# Patient Record
Sex: MALE | Race: OTHER | HISPANIC OR LATINO
[De-identification: names, ages, dates, MRNs, and addresses within clinical notes are randomized per-mention and may not be internally consistent; named-entity substitution may affect disease eponyms.]

---

## 2018-02-05 ENCOUNTER — APPOINTMENT (OUTPATIENT)
Dept: ENDOCRINOLOGY | Facility: CLINIC | Age: 63
End: 2018-02-05

## 2021-07-28 ENCOUNTER — RESULT REVIEW (OUTPATIENT)
Age: 66
End: 2021-07-28

## 2021-08-04 LAB
ALBUMIN SERPL ELPH-MCNC: 4.6 G/DL
ALP BLD-CCNC: 97 U/L
ALT SERPL-CCNC: 20 U/L
ANION GAP SERPL CALC-SCNC: 12 MMOL/L
APTT BLD: 32.8 SEC
AST SERPL-CCNC: 19 U/L
BASOPHILS # BLD AUTO: 0.05 K/UL
BASOPHILS NFR BLD AUTO: 0.8 %
BILIRUB SERPL-MCNC: 0.3 MG/DL
BUN SERPL-MCNC: 13 MG/DL
CALCIUM SERPL-MCNC: 9.8 MG/DL
CHLORIDE SERPL-SCNC: 104 MMOL/L
CO2 SERPL-SCNC: 25 MMOL/L
CREAT SERPL-MCNC: 1.14 MG/DL
EOSINOPHIL # BLD AUTO: 0.07 K/UL
EOSINOPHIL NFR BLD AUTO: 1.1 %
GLUCOSE SERPL-MCNC: 82 MG/DL
HCT VFR BLD CALC: 48.1 %
HGB BLD-MCNC: 16.1 G/DL
IMM GRANULOCYTES NFR BLD AUTO: 0.5 %
INR PPP: 1.04 RATIO
LDH SERPL-CCNC: 179 U/L
LYMPHOCYTES # BLD AUTO: 1.84 K/UL
LYMPHOCYTES NFR BLD AUTO: 28.3 %
MAGNESIUM SERPL-MCNC: 2.1 MG/DL
MAN DIFF?: NORMAL
MCHC RBC-ENTMCNC: 29.9 PG
MCHC RBC-ENTMCNC: 33.5 GM/DL
MCV RBC AUTO: 89.2 FL
MONOCYTES # BLD AUTO: 0.68 K/UL
MONOCYTES NFR BLD AUTO: 10.5 %
NEUTROPHILS # BLD AUTO: 3.83 K/UL
NEUTROPHILS NFR BLD AUTO: 58.8 %
PHOSPHATE SERPL-MCNC: 3.2 MG/DL
PLATELET # BLD AUTO: 311 K/UL
POTASSIUM SERPL-SCNC: 4.7 MMOL/L
PROT SERPL-MCNC: 7.5 G/DL
PT BLD: 12.2 SEC
RBC # BLD: 5.39 M/UL
RBC # FLD: 13.1 %
SODIUM SERPL-SCNC: 141 MMOL/L
URATE SERPL-MCNC: 7.2 MG/DL
WBC # FLD AUTO: 6.5 K/UL

## 2021-08-05 LAB
CD3 CELLS # BLD: 1256 /UL
CD3 CELLS NFR BLD: 74 %
CD3+CD4+ CELLS # BLD: 709 /UL
CD3+CD4+ CELLS NFR BLD: 42 %
CD3+CD4+ CELLS/CD3+CD8+ CLL SPEC: 2.35 RATIO
CD3+CD8+ CELLS # SPEC: 302 /UL
CD3+CD8+ CELLS NFR BLD: 18 %
HBV CORE IGG+IGM SER QL: NONREACTIVE
HBV SURFACE AB SER QL: NONREACTIVE
HBV SURFACE AG SER QL: NONREACTIVE
HCV AB SER QL: NONREACTIVE
HCV S/CO RATIO: 0.13 S/CO
HIV1+2 AB SPEC QL IA.RAPID: NONREACTIVE

## 2021-08-06 ENCOUNTER — APPOINTMENT (OUTPATIENT)
Dept: HEMATOLOGY ONCOLOGY | Facility: CLINIC | Age: 66
End: 2021-08-06
Payer: MEDICARE

## 2021-08-06 VITALS
TEMPERATURE: 97.5 F | WEIGHT: 241 LBS | OXYGEN SATURATION: 99 % | DIASTOLIC BLOOD PRESSURE: 80 MMHG | SYSTOLIC BLOOD PRESSURE: 128 MMHG | RESPIRATION RATE: 16 BRPM | HEIGHT: 72 IN | BODY MASS INDEX: 32.64 KG/M2 | HEART RATE: 60 BPM

## 2021-08-06 DIAGNOSIS — K22.70 BARRETT'S ESOPHAGUS W/OUT DYSPLASIA: ICD-10-CM

## 2021-08-06 DIAGNOSIS — Z80.0 FAMILY HISTORY OF MALIGNANT NEOPLASM OF DIGESTIVE ORGANS: ICD-10-CM

## 2021-08-06 PROCEDURE — 99205 OFFICE O/P NEW HI 60 MIN: CPT | Mod: GC

## 2021-08-06 NOTE — ASSESSMENT
[FreeTextEntry1] : 66 year old male presenting for initial consultation for recent diagnosis of DLBCL, non-germinal center subtype. Patient had a sonoguided core biopsy of right lymph node of neck on 7/28/21 with pathology resulting in \par CD5, CD10 negative lymphoma, favor Diffuse large B-cell lymphoma, non-Germinal center subtype. Given the pathology results, I think he needs an excisional biopsy to further characterize his lymphoma. Additionally, he has had a CT neck (report pending), but will need full body imaging to stage his lymphoma. We discussed that aggressive lymphomas require treatment with chemotherapy, but depending on the subtype are potentially curable. He has had some symptoms related to his neck mass including some mild difficulty swallowing but has been able to eat and drink. He does not have shortness of breath.  \par \par -plan to expedite PET/CT for staging and to guide for excisional biopsy\par -referred patient to ENT for excisional biopsy, appointment scheduled for Monday \par -will plan for chemo port placement\par -will discuss treatment option once excisional biopsy is complete  \par

## 2021-08-06 NOTE — END OF VISIT
[FreeTextEntry3] : Patient seen and case discussed with LAURA Bolanos. I agree with above and have edited the note where needed.\par  [Time Spent: ___ minutes] : I have spent [unfilled] minutes of time on the encounter.

## 2021-08-06 NOTE — RESULTS/DATA
[FreeTextEntry1] : Final Diagnosis\par 1.  Lymph node, right neck, sono-guided core biopsy:\par - CD5, CD10 negative lymphoma, favor Diffuse large B-cell\par lymphoma, non-Germinal center subtype.\par \par See note and description.\par \par Diagnostic note:  The current right neck lymph node is\par morphologically and immunophenotypically consistent with a CD5,\par CD10 negative lymphoma, favor diffuse large B-cell lymphoma, non-\par germinal center subtype. Additional studies, including molecular\par and cytogenetics/FISH are performed and will be reported in a\par comprehensive addendum.\par \par Dr. Orozco was urgently faxed report on 8/2/21.\par \par Microscopic description:   The histologic sections of the right\par neck lymph node biopsy shows fragmented lymphoid tissue with\par areas of increased atypical larger cells. The larger cells show\par increased nuclear to cytoplasmic ratio, irregular nuclear\par contours and some with prominent nucleoli. No necrosis is present\par and mitoses/apoptosis are frequent.\par \par Immunohistochemical stains for CD3, CD5, CD20, PAX5, CD10, BCL6,\par BCL2, CyclinD1, Ki67, CD23, CD21, CD43, CD79a, MUM1 stains\par performed on formalin fixed paraffin embedded tissue, block 1A.\par \par Neoplastic cells are:  the larger atypical cells\par Positive:  CD20, PAX-5, BCL-6, MUM1, BCL2, p53 and some scattered\par CD30 positive cells, KI-67% is high within the areas of the\par larger cells (70-80%), c-MYC\par Negative: CD3, CD5, CD10, CD23 and CD21 (in the areas of the\par larger cells), ROSE\par Other:  CD3 and CD5 are positive in T-cells\par \par Flow cytometry: Monotypic B-cells (33% of cells), positive for\par kappa, CD19, CD20, FMC-7; negative CD5, CD10, CD23. The findings\par are consistent with CD5 negative, CD10 negative B-cell\par lymphoproliferative disorder/lymphoma.\par \par Cytogenetics/FISH: pending\par \par \par \par \par \par \par BAO UREÑA                 3\par \par \par \par Surgical Final Report\par \par \par \par \par Molecular studies: pending\par \par Verified by: Jennifer Gorman MD\par (Electronic Signature)\par Reported on: 08/02/21 12:38 EDT, [address],\[place] [address]\par Phone: [phone]   Fax: [phone]\par _________________________________________________________________\par \par

## 2021-08-06 NOTE — REASON FOR VISIT
[Initial Consultation] : an initial consultation for [Lymphoma] : lymphoma [Spouse] : spouse [Family Member] : family member

## 2021-08-06 NOTE — HISTORY OF PRESENT ILLNESS
[de-identified] : Mr. Gregory Mercado is a 66 year old man with PMHx of Us esophagus, history of two knee replacements, hip replacements, neck surgery, arm surgery, and lower back surgery, He takes baclofen, nabumetone, naproxen, and sometimes Tylenol extra strength for pain. He also take omeprazole. Patient presents today for initial consultation for recent diagnosis of DLBCL, non-germinal center subtype. Patient had a sonoguided core biopsy of right lymph node of neck on 7/28/21 with pathology resulting in \par CD5, CD10 negative lymphoma, favor Diffuse large B-cell lymphoma, non-Germinal center subtype.  \par \par Patient reports he has enlarged right neck mass that began growing 1 month ago. He report this to his PCP, who proceeded the patient to have sono guide core biopsy. Patient reports his voice is hoarse and he at times has difficulty swallowing. He denies choking, difficulty breathing. Denies fever/chills, No drenching night sweats. No CP/SOB. No lower extremity edema. Appetite is okay. Weight stable. No abd pain. Has occasional loose stools. No hematuria, dysuria. No recent/recurrent infections. Energy levels stable.\par He is a retired  and is now a full time .

## 2021-08-06 NOTE — REVIEW OF SYSTEMS
[Patient Intake Form Reviewed] : Patient intake form was reviewed [Hoarseness] : hoarseness [Joint Pain] : joint pain [Muscle Pain] : muscle pain [Swollen Glands] : swollen glands [Negative] : Allergic/Immunologic [de-identified] : occasional numbness in arms

## 2021-08-06 NOTE — PHYSICAL EXAM
[Fully active, able to carry on all pre-disease performance without restriction] : Status 0 - Fully active, able to carry on all pre-disease performance without restriction [Normal] : affect appropriate [de-identified] : enlarged right cervical lymph node with mild tenderness  [de-identified] : enlarged right cervical lymph node measuring about 4 cm

## 2021-08-07 LAB — EBV DNA SERPL NAA+PROBE-ACNC: NOT DETECTED IU/ML

## 2021-08-09 ENCOUNTER — APPOINTMENT (OUTPATIENT)
Dept: OTOLARYNGOLOGY | Facility: CLINIC | Age: 66
End: 2021-08-09
Payer: MEDICARE

## 2021-08-09 LAB — G6PD SER-CCNC: 16 U/G HGB

## 2021-08-09 PROCEDURE — 31575 DIAGNOSTIC LARYNGOSCOPY: CPT

## 2021-08-09 PROCEDURE — 99204 OFFICE O/P NEW MOD 45 MIN: CPT | Mod: 25

## 2021-08-09 NOTE — HISTORY OF PRESENT ILLNESS
[de-identified] : 66 year old male refer by Dr. Sierra for excisional biopsy of the right cervical lymph nodes. pt recent diagnosis of DLBCL, non-germinal center subtype, core biopsy of right lymph node of neck on 7/28/21 showed CD5, CD10 negative lymphoma, favor Diffuse large B-cell lymphoma, non-Germinal center subtype. pt notices the right neck mass about one month and increase in size, mild discomfort, mild discomfort on swallowing and irritation with coughing. ct of neck and ABD  on 8/5/2021 and PET CT sched on 8/10/2021.

## 2021-08-09 NOTE — PHYSICAL EXAM
[de-identified] : Right level IB/IIa LN, approx. 3 cm, firm, mobile, no TTP. [Midline] : trachea located in midline position [Laryngoscopy Performed] : laryngoscopy was performed, see procedure section for findings [Normal] : no rashes [de-identified] : No other LAD.

## 2021-08-09 NOTE — PROCEDURE
[Macroglossia] : macroglossia preventing mirror examination [None] : none [Flexible Endoscope] : examined with the flexible endoscope [Serial Number: ___] : Serial Number: [unfilled] [de-identified] : No lesions in the NPx, OPx, HPx or larynx.  VC are mobile, airway patent.\par

## 2021-08-10 ENCOUNTER — OUTPATIENT (OUTPATIENT)
Dept: OUTPATIENT SERVICES | Facility: HOSPITAL | Age: 66
LOS: 1 days | End: 2021-08-10
Payer: COMMERCIAL

## 2021-08-10 ENCOUNTER — APPOINTMENT (OUTPATIENT)
Dept: NUCLEAR MEDICINE | Facility: IMAGING CENTER | Age: 66
End: 2021-08-10
Payer: MEDICARE

## 2021-08-10 DIAGNOSIS — C85.10 UNSPECIFIED B-CELL LYMPHOMA, UNSPECIFIED SITE: ICD-10-CM

## 2021-08-10 PROCEDURE — 78815 PET IMAGE W/CT SKULL-THIGH: CPT | Mod: 26,PI

## 2021-08-10 PROCEDURE — A9552: CPT

## 2021-08-10 PROCEDURE — 78815 PET IMAGE W/CT SKULL-THIGH: CPT

## 2021-08-11 ENCOUNTER — OUTPATIENT (OUTPATIENT)
Dept: OUTPATIENT SERVICES | Facility: HOSPITAL | Age: 66
LOS: 1 days | End: 2021-08-11
Payer: MEDICARE

## 2021-08-11 VITALS
SYSTOLIC BLOOD PRESSURE: 133 MMHG | OXYGEN SATURATION: 98 % | WEIGHT: 238.1 LBS | HEART RATE: 74 BPM | DIASTOLIC BLOOD PRESSURE: 84 MMHG | HEIGHT: 70 IN | TEMPERATURE: 98 F | RESPIRATION RATE: 16 BRPM

## 2021-08-11 DIAGNOSIS — C85.10 UNSPECIFIED B-CELL LYMPHOMA, UNSPECIFIED SITE: ICD-10-CM

## 2021-08-11 DIAGNOSIS — Z98.890 OTHER SPECIFIED POSTPROCEDURAL STATES: Chronic | ICD-10-CM

## 2021-08-11 DIAGNOSIS — M19.90 UNSPECIFIED OSTEOARTHRITIS, UNSPECIFIED SITE: ICD-10-CM

## 2021-08-11 DIAGNOSIS — G47.33 OBSTRUCTIVE SLEEP APNEA (ADULT) (PEDIATRIC): ICD-10-CM

## 2021-08-11 DIAGNOSIS — T78.40XA ALLERGY, UNSPECIFIED, INITIAL ENCOUNTER: ICD-10-CM

## 2021-08-11 DIAGNOSIS — C83.30 DIFFUSE LARGE B-CELL LYMPHOMA, UNSPECIFIED SITE: ICD-10-CM

## 2021-08-11 LAB
ALBUMIN SERPL ELPH-MCNC: 4.6 G/DL — SIGNIFICANT CHANGE UP (ref 3.3–5)
ALP SERPL-CCNC: 94 U/L — SIGNIFICANT CHANGE UP (ref 40–120)
ALT FLD-CCNC: 30 U/L — SIGNIFICANT CHANGE UP (ref 4–41)
ANION GAP SERPL CALC-SCNC: 13 MMOL/L — SIGNIFICANT CHANGE UP (ref 7–14)
AST SERPL-CCNC: 29 U/L — SIGNIFICANT CHANGE UP (ref 4–40)
BILIRUB SERPL-MCNC: 0.4 MG/DL — SIGNIFICANT CHANGE UP (ref 0.2–1.2)
BUN SERPL-MCNC: 14 MG/DL — SIGNIFICANT CHANGE UP (ref 7–23)
CALCIUM SERPL-MCNC: 9.7 MG/DL — SIGNIFICANT CHANGE UP (ref 8.4–10.5)
CHLORIDE SERPL-SCNC: 105 MMOL/L — SIGNIFICANT CHANGE UP (ref 98–107)
CO2 SERPL-SCNC: 23 MMOL/L — SIGNIFICANT CHANGE UP (ref 22–31)
CREAT SERPL-MCNC: 1.19 MG/DL — SIGNIFICANT CHANGE UP (ref 0.5–1.3)
GLUCOSE SERPL-MCNC: 84 MG/DL — SIGNIFICANT CHANGE UP (ref 70–99)
HCT VFR BLD CALC: 44.8 % — SIGNIFICANT CHANGE UP (ref 39–50)
HGB BLD-MCNC: 15.2 G/DL — SIGNIFICANT CHANGE UP (ref 13–17)
MCHC RBC-ENTMCNC: 29 PG — SIGNIFICANT CHANGE UP (ref 27–34)
MCHC RBC-ENTMCNC: 33.9 GM/DL — SIGNIFICANT CHANGE UP (ref 32–36)
MCV RBC AUTO: 85.5 FL — SIGNIFICANT CHANGE UP (ref 80–100)
NRBC # BLD: 0 /100 WBCS — SIGNIFICANT CHANGE UP
NRBC # FLD: 0 K/UL — SIGNIFICANT CHANGE UP
PLATELET # BLD AUTO: 315 K/UL — SIGNIFICANT CHANGE UP (ref 150–400)
POTASSIUM SERPL-MCNC: 4.1 MMOL/L — SIGNIFICANT CHANGE UP (ref 3.5–5.3)
POTASSIUM SERPL-SCNC: 4.1 MMOL/L — SIGNIFICANT CHANGE UP (ref 3.5–5.3)
PROT SERPL-MCNC: 7.9 G/DL — SIGNIFICANT CHANGE UP (ref 6–8.3)
RBC # BLD: 5.24 M/UL — SIGNIFICANT CHANGE UP (ref 4.2–5.8)
RBC # FLD: 12.8 % — SIGNIFICANT CHANGE UP (ref 10.3–14.5)
SODIUM SERPL-SCNC: 141 MMOL/L — SIGNIFICANT CHANGE UP (ref 135–145)
WBC # BLD: 6.18 K/UL — SIGNIFICANT CHANGE UP (ref 3.8–10.5)
WBC # FLD AUTO: 6.18 K/UL — SIGNIFICANT CHANGE UP (ref 3.8–10.5)

## 2021-08-11 PROCEDURE — 93010 ELECTROCARDIOGRAM REPORT: CPT

## 2021-08-11 RX ORDER — SODIUM CHLORIDE 9 MG/ML
1000 INJECTION, SOLUTION INTRAVENOUS
Refills: 0 | Status: DISCONTINUED | OUTPATIENT
Start: 2021-08-18 | End: 2021-09-02

## 2021-08-11 RX ORDER — SODIUM CHLORIDE 9 MG/ML
3 INJECTION INTRAMUSCULAR; INTRAVENOUS; SUBCUTANEOUS EVERY 8 HOURS
Refills: 0 | Status: DISCONTINUED | OUTPATIENT
Start: 2021-08-18 | End: 2021-09-02

## 2021-08-11 NOTE — H&P PST ADULT - PROBLEM SELECTOR PLAN 4
Assessment and Plan: Patient scheduled for surgery on 8/18/21  Patient provided with verbal and written presurgical instructions; verbalized understanding  with teach back.    Patient provided with famotidine for GI prophylaxis; verbalized understanding.    Patient provided with Chlorhexidine wash, verbal and written instructions reviewed. Patient demonstrated understanding with teach back.   Patient confirmed COVID appointment     Medical evaluation requested by PST for OLEGARIO, advanced age , patient verbalized understanding, will obtain Assessment and Plan: Patient scheduled for surgery on 8/18/21  Patient provided with verbal and written presurgical instructions; verbalized understanding  with teach back.    Patient instructed to take his own omeprazole for GI prophylaxis; verbalized understanding.    Patient provided with Chlorhexidine wash, verbal and written instructions reviewed. Patient demonstrated understanding with teach back.   Patient confirmed COVID appointment     Medical evaluation requested by PST for OLEGARIO, advanced age , patient verbalized understanding, will obtain

## 2021-08-11 NOTE — H&P PST ADULT - HEALTH CARE MAINTENANCE
covid vaccine: pfizer march and april 2021   Denies history of covid infection, recent international travel or exposure to known +covid person

## 2021-08-11 NOTE — H&P PST ADULT - NEGATIVE CARDIOVASCULAR SYMPTOMS
no chest pain/no palpitations/no dyspnea on exertion/no peripheral edema no chest pain/no palpitations/no dyspnea on exertion/no peripheral edema/no claudication

## 2021-08-11 NOTE — H&P PST ADULT - NEGATIVE ENMT SYMPTOMS
no hearing difficulty/no tinnitus/no vertigo no hearing difficulty/no tinnitus/no vertigo/no nasal obstruction/no dry mouth

## 2021-08-11 NOTE — H&P PST ADULT - NEGATIVE NEUROLOGICAL SYMPTOMS
no generalized seizures/no focal seizures/no vertigo/no loss of sensation/no difficulty walking/no headache/no hemiparesis

## 2021-08-11 NOTE — H&P PST ADULT - NSICDXPASTSURGICALHX_GEN_ALL_CORE_FT
PAST SURGICAL HISTORY:  H/O arthroscopy left shoulder    H/O cervical spine surgery metal hardware    History of back surgery lumbar region- details unknown    S/P Appendectomy     S/P TKR (Total Knee Replacement) biltaeral    Total Hip Replacement bilateral

## 2021-08-11 NOTE — H&P PST ADULT - NSICDXPASTMEDICALHX_GEN_ALL_CORE_FT
PAST MEDICAL HISTORY:  GERD (gastroesophageal reflux disease)     History of Transient Ischemic Attack 2 times, > 20 years ago    Hypercholesterolemia patient reports resloved    OA (osteoarthritis)     Obese     OLEGARIO (obstructive sleep apnea) does not use device    Unspecified B-cell lymphoma, unspecified site

## 2021-08-11 NOTE — H&P PST ADULT - HISTORY OF PRESENT ILLNESS
66 yr old reports noting a right neck mass, biopsy noted B- cell lymphoma now scheduled for excisional biopsy of right neck node, patient reports increase in size, mild discomfort, chocking sensation when swallowing and irritation with coughing 66 yr old reports noting a right neck mass one month ago, biopsy noted B- cell lymphoma now scheduled for excisional biopsy of right neck node, patient reports increase in size, mild discomfort, chocking sensation when swallowing and irritation with coughing

## 2021-08-11 NOTE — H&P PST ADULT - NEUROLOGICAL DETAILS
alert and oriented x 3/sensation intact/cranial nerves intact/normal strength alert and oriented x 3/sensation intact/cranial nerves intact/strength decreased

## 2021-08-11 NOTE — H&P PST ADULT - NS MD HP INPLANTS MED DEV
bilateral hip and knee/Artificial joint bilateral hip and knee, left shoulder hardware/Artificial joint/Brain/Spinal implant

## 2021-08-11 NOTE — H&P PST ADULT - VENOUS THROMBOEMBOLISM CURRENT STATUS
03-Jul-2017 17:30
(1) other risk factor (includes escalating BMI, pack-years of smoking, diabetes requiring insulin, chemotherapy, female gender and length of surgery)/(2) malignancy (present or previous)

## 2021-08-12 ENCOUNTER — APPOINTMENT (OUTPATIENT)
Dept: HEMATOLOGY ONCOLOGY | Facility: CLINIC | Age: 66
End: 2021-08-12
Payer: MEDICARE

## 2021-08-12 PROCEDURE — 99214 OFFICE O/P EST MOD 30 MIN: CPT | Mod: GC,95

## 2021-08-12 NOTE — HISTORY OF PRESENT ILLNESS
[de-identified] : Mr. Gregory Mercado is a 66 year old man with PMHx of Us esophagus, history of two knee replacements, hip replacements, neck surgery, arm surgery, and lower back surgery, He takes baclofen, nabumetone, naproxen, and sometimes Tylenol extra strength for pain. He also take omeprazole. Patient presents today for initial consultation for recent diagnosis of DLBCL, non-germinal center subtype. Patient had a sonoguided core biopsy of right lymph node of neck on 7/28/21 with pathology resulting in \par CD5, CD10 negative lymphoma, favor Diffuse large B-cell lymphoma, non-Germinal center subtype.  \par \par Patient reports he has enlarged right neck mass that began growing 1 month ago. He report this to his PCP, who proceeded the patient to have sono guide core biopsy. Patient reports his voice is hoarse and he at times has difficulty swallowing. He denies choking, difficulty breathing. Denies fever/chills, No drenching night sweats. No CP/SOB. No lower extremity edema. Appetite is okay. Weight stable. No abd pain. Has occasional loose stools. No hematuria, dysuria. No recent/recurrent infections. Energy levels stable.\par He is a retired  and is now a full time . [de-identified] : Since his last visit, he was evaluated by ENT and scheduled for excisional biopsy on 8/18. He has no new fevers, chills or night sweats. He has no difficulty swallowing. He has noticed some new left jaw pain, unclear if this is his teeth or jaw. Recent oral evaluation by Dr. Ulloa was overall normal.

## 2021-08-12 NOTE — REVIEW OF SYSTEMS
[Patient Intake Form Reviewed] : Patient intake form was reviewed [Hoarseness] : hoarseness [Joint Pain] : joint pain [Muscle Pain] : muscle pain [Swollen Glands] : swollen glands [Negative] : Allergic/Immunologic [de-identified] : occasional numbness in arms

## 2021-08-12 NOTE — ASSESSMENT
[FreeTextEntry1] : 66 year old male presenting for initial consultation for recent diagnosis of DLBCL, non-germinal center subtype. Patient had a sonoguided core biopsy of right lymph node of neck on 7/28/21 with pathology resulting in \par CD5, CD10 negative lymphoma, favor Diffuse large B-cell lymphoma, non-Germinal center subtype. Given the pathology results, he will go for an excisional biopsy next week. We discussed that we can start prednisone after the biopsy which will improve his symptoms. PET/CT demonstrated the known R cervical node (SUV 30s) and possibly a 0.9 cm perigastric LN (SUV 5). Discussed that this likely is stage III disease and would recommend chemotherapy for treatment. We discussed that patients with DLBCL receive regimens like R-CHOP. \par -follow up excsional biopsy pathology \par -chemotherapy port placement to be scheduled\par -check TTE \par -will arrange for chemotherapy chair for 8/30/21. \par

## 2021-08-12 NOTE — RESULTS/DATA
[FreeTextEntry1] : Final Diagnosis\par 1.  Lymph node, right neck, sono-guided core biopsy:\par - CD5, CD10 negative lymphoma, favor Diffuse large B-cell\par lymphoma, non-Germinal center subtype.\par \par See note and description.\par \par Diagnostic note:  The current right neck lymph node is\par morphologically and immunophenotypically consistent with a CD5,\par CD10 negative lymphoma, favor diffuse large B-cell lymphoma, non-\par germinal center subtype. Additional studies, including molecular\par and cytogenetics/FISH are performed and will be reported in a\par comprehensive addendum.\par \par Dr. Orozco was urgently faxed report on 8/2/21.\par \par Microscopic description:   The histologic sections of the right\par neck lymph node biopsy shows fragmented lymphoid tissue with\par areas of increased atypical larger cells. The larger cells show\par increased nuclear to cytoplasmic ratio, irregular nuclear\par contours and some with prominent nucleoli. No necrosis is present\par and mitoses/apoptosis are frequent.\par \par Immunohistochemical stains for CD3, CD5, CD20, PAX5, CD10, BCL6,\par BCL2, CyclinD1, Ki67, CD23, CD21, CD43, CD79a, MUM1 stains\par performed on formalin fixed paraffin embedded tissue, block 1A.\par \par Neoplastic cells are:  the larger atypical cells\par Positive:  CD20, PAX-5, BCL-6, MUM1, BCL2, p53 and some scattered\par CD30 positive cells, KI-67% is high within the areas of the\par larger cells (70-80%), c-MYC\par Negative: CD3, CD5, CD10, CD23 and CD21 (in the areas of the\par larger cells), ROSE\par Other:  CD3 and CD5 are positive in T-cells\par \par Flow cytometry: Monotypic B-cells (33% of cells), positive for\par kappa, CD19, CD20, FMC-7; negative CD5, CD10, CD23. The findings\par are consistent with CD5 negative, CD10 negative B-cell\par lymphoproliferative disorder/lymphoma.\par \par Cytogenetics/FISH: pending\par \par \par \par \par \par \par BAO UREÑA                 3\par \par \par \par Surgical Final Report\par \par \par \par \par Molecular studies: pending\par \par Verified by: Jennifer Gorman MD\par (Electronic Signature)\par Reported on: 08/02/21 12:38 EDT, [address],\par [place] [address]\par Phone: [phone]   Fax: [phone]\par _________________________________________________________________\par \par PET/CT 8/12/21\par IMPRESSION: Abnormal FDG-PET/CT scan.\par \par 1. FDG-avid right cervical lymphadenopathy corresponds to biopsy-proven lymphoma.\par \par 2. Small FDG-avid perigastric lymph node is indeterminate, possibly lymphoma.\par \par --- End of Report ---\par

## 2021-08-15 ENCOUNTER — APPOINTMENT (OUTPATIENT)
Dept: DISASTER EMERGENCY | Facility: CLINIC | Age: 66
End: 2021-08-15

## 2021-08-16 LAB — SARS-COV-2 N GENE NPH QL NAA+PROBE: NOT DETECTED

## 2021-08-17 ENCOUNTER — OUTPATIENT (OUTPATIENT)
Dept: OUTPATIENT SERVICES | Facility: HOSPITAL | Age: 66
LOS: 1 days | End: 2021-08-17
Payer: COMMERCIAL

## 2021-08-17 ENCOUNTER — TRANSCRIPTION ENCOUNTER (OUTPATIENT)
Age: 66
End: 2021-08-17

## 2021-08-17 DIAGNOSIS — Z98.890 OTHER SPECIFIED POSTPROCEDURAL STATES: Chronic | ICD-10-CM

## 2021-08-17 DIAGNOSIS — Z11.52 ENCOUNTER FOR SCREENING FOR COVID-19: ICD-10-CM

## 2021-08-17 PROBLEM — K21.9 GASTRO-ESOPHAGEAL REFLUX DISEASE WITHOUT ESOPHAGITIS: Chronic | Status: ACTIVE | Noted: 2021-08-11

## 2021-08-17 PROBLEM — E66.9 OBESITY, UNSPECIFIED: Chronic | Status: ACTIVE | Noted: 2021-08-11

## 2021-08-17 PROBLEM — M19.90 UNSPECIFIED OSTEOARTHRITIS, UNSPECIFIED SITE: Chronic | Status: ACTIVE | Noted: 2021-08-11

## 2021-08-17 PROBLEM — C85.10 UNSPECIFIED B-CELL LYMPHOMA, UNSPECIFIED SITE: Chronic | Status: ACTIVE | Noted: 2021-08-11

## 2021-08-17 PROBLEM — G47.33 OBSTRUCTIVE SLEEP APNEA (ADULT) (PEDIATRIC): Chronic | Status: ACTIVE | Noted: 2021-08-11

## 2021-08-17 LAB — SARS-COV-2 RNA SPEC QL NAA+PROBE: SIGNIFICANT CHANGE UP

## 2021-08-17 PROCEDURE — U0005: CPT

## 2021-08-17 PROCEDURE — C9803: CPT

## 2021-08-17 PROCEDURE — U0003: CPT

## 2021-08-18 ENCOUNTER — OUTPATIENT (OUTPATIENT)
Dept: OUTPATIENT SERVICES | Facility: HOSPITAL | Age: 66
LOS: 1 days | End: 2021-08-18
Payer: COMMERCIAL

## 2021-08-18 ENCOUNTER — APPOINTMENT (OUTPATIENT)
Dept: OTOLARYNGOLOGY | Facility: HOSPITAL | Age: 66
End: 2021-08-18

## 2021-08-18 ENCOUNTER — RESULT REVIEW (OUTPATIENT)
Age: 66
End: 2021-08-18

## 2021-08-18 ENCOUNTER — OUTPATIENT (OUTPATIENT)
Dept: OUTPATIENT SERVICES | Facility: HOSPITAL | Age: 66
LOS: 1 days | Discharge: HOME | End: 2021-08-18
Payer: MEDICARE

## 2021-08-18 VITALS
SYSTOLIC BLOOD PRESSURE: 133 MMHG | HEIGHT: 70 IN | OXYGEN SATURATION: 98 % | HEART RATE: 74 BPM | WEIGHT: 238.1 LBS | DIASTOLIC BLOOD PRESSURE: 84 MMHG | RESPIRATION RATE: 16 BRPM | TEMPERATURE: 98 F

## 2021-08-18 VITALS
TEMPERATURE: 98 F | RESPIRATION RATE: 14 BRPM | SYSTOLIC BLOOD PRESSURE: 142 MMHG | OXYGEN SATURATION: 100 % | DIASTOLIC BLOOD PRESSURE: 81 MMHG | HEART RATE: 66 BPM

## 2021-08-18 DIAGNOSIS — Z98.890 OTHER SPECIFIED POSTPROCEDURAL STATES: Chronic | ICD-10-CM

## 2021-08-18 DIAGNOSIS — C85.10 UNSPECIFIED B-CELL LYMPHOMA, UNSPECIFIED SITE: ICD-10-CM

## 2021-08-18 PROCEDURE — 88360 TUMOR IMMUNOHISTOCHEM/MANUAL: CPT | Mod: 26

## 2021-08-18 PROCEDURE — 88280 CHROMOSOME KARYOTYPE STUDY: CPT

## 2021-08-18 PROCEDURE — 88264 CHROMOSOME ANALYSIS 20-25: CPT

## 2021-08-18 PROCEDURE — 38510 BIOPSY/REMOVAL LYMPH NODES: CPT | Mod: GC

## 2021-08-18 PROCEDURE — 88365 INSITU HYBRIDIZATION (FISH): CPT | Mod: 26,59

## 2021-08-18 PROCEDURE — 88341 IMHCHEM/IMCYTCHM EA ADD ANTB: CPT | Mod: 26,59

## 2021-08-18 PROCEDURE — 88367 INSITU HYBRIDIZATION AUTO: CPT | Mod: 26

## 2021-08-18 PROCEDURE — 88342 IMHCHEM/IMCYTCHM 1ST ANTB: CPT | Mod: 26,59

## 2021-08-18 PROCEDURE — 88237 TISSUE CULTURE BONE MARROW: CPT

## 2021-08-18 RX ORDER — BACITRACIN ZINC 500 UNIT/G
1 OINTMENT IN PACKET (EA) TOPICAL
Qty: 40 | Refills: 0
Start: 2021-08-18 | End: 2021-09-06

## 2021-08-18 RX ORDER — OXYCODONE HYDROCHLORIDE 5 MG/1
1 TABLET ORAL
Qty: 8 | Refills: 0
Start: 2021-08-18

## 2021-08-18 RX ADMIN — SODIUM CHLORIDE 30 MILLILITER(S): 9 INJECTION, SOLUTION INTRAVENOUS at 15:25

## 2021-08-18 NOTE — ASU DISCHARGE PLAN (ADULT/PEDIATRIC) - CARE PROVIDER_API CALL
Juan Pablo Ulloa)  Otolaryngology  [address] [place] [address]  Phone: [phone]  Fax: [phone]  Follow Up Time:

## 2021-08-18 NOTE — ASU DISCHARGE PLAN (ADULT/PEDIATRIC) - NURSING INSTRUCTIONS
DO NOT take any Tylenol (Acetaminophen) or narcotics containing Tylenol until after 10:45 p.m.. You received Tylenol during your operation and it can cause damage to your liver if too much is taken within a 24 hour time period.

## 2021-08-18 NOTE — BRIEF OPERATIVE NOTE - OPERATION/FINDINGS
Procedures performed: Lymph node biopsy   Preoperative Diagnosis: Lymph node mass  Postoperative Diagnosis: same as above  Attending: Dr. Ulloa  Assistant: see op note  Anesthesia: General  EBL: Minimal  Condition: Stable  Complicastions: None  Drains/Transfusion: None  Specimen/Cultures: Yes  Findings: See operative note

## 2021-08-18 NOTE — ASU DISCHARGE PLAN (ADULT/PEDIATRIC) - ASU DC SPECIAL INSTRUCTIONSFT
No heavy lifting for 4-6 weeks, light activity for 4 weeks No heavy lifting for 4-6 weeks, light activity for 4 weeks    Bacitracinn  twice a dayto incisiion    keep dry for 48 hours

## 2021-08-19 DIAGNOSIS — C85.90 NON-HODGKIN LYMPHOMA, UNSPECIFIED, UNSPECIFIED SITE: ICD-10-CM

## 2021-08-20 ENCOUNTER — RESULT REVIEW (OUTPATIENT)
Age: 66
End: 2021-08-20

## 2021-08-20 ENCOUNTER — OUTPATIENT (OUTPATIENT)
Dept: OUTPATIENT SERVICES | Facility: HOSPITAL | Age: 66
LOS: 1 days | End: 2021-08-20
Payer: COMMERCIAL

## 2021-08-20 VITALS
HEART RATE: 59 BPM | OXYGEN SATURATION: 99 % | SYSTOLIC BLOOD PRESSURE: 144 MMHG | RESPIRATION RATE: 16 BRPM | DIASTOLIC BLOOD PRESSURE: 87 MMHG

## 2021-08-20 VITALS
TEMPERATURE: 98 F | DIASTOLIC BLOOD PRESSURE: 88 MMHG | SYSTOLIC BLOOD PRESSURE: 151 MMHG | OXYGEN SATURATION: 98 % | WEIGHT: 237 LBS | HEART RATE: 57 BPM | RESPIRATION RATE: 16 BRPM

## 2021-08-20 DIAGNOSIS — C85.10 UNSPECIFIED B-CELL LYMPHOMA, UNSPECIFIED SITE: ICD-10-CM

## 2021-08-20 DIAGNOSIS — Z98.890 OTHER SPECIFIED POSTPROCEDURAL STATES: Chronic | ICD-10-CM

## 2021-08-20 PROCEDURE — 76937 US GUIDE VASCULAR ACCESS: CPT | Mod: 26

## 2021-08-20 PROCEDURE — C1894: CPT

## 2021-08-20 PROCEDURE — 36561 INSERT TUNNELED CV CATH: CPT

## 2021-08-20 PROCEDURE — 77001 FLUOROGUIDE FOR VEIN DEVICE: CPT | Mod: 26

## 2021-08-20 PROCEDURE — 76937 US GUIDE VASCULAR ACCESS: CPT

## 2021-08-20 PROCEDURE — C1788: CPT

## 2021-08-20 PROCEDURE — C1769: CPT

## 2021-08-20 PROCEDURE — 77001 FLUOROGUIDE FOR VEIN DEVICE: CPT

## 2021-08-20 RX ORDER — ACETAMINOPHEN 500 MG
2 TABLET ORAL
Qty: 0 | Refills: 0 | DISCHARGE

## 2021-08-20 RX ORDER — OMEPRAZOLE 10 MG/1
1 CAPSULE, DELAYED RELEASE ORAL
Qty: 0 | Refills: 0 | DISCHARGE

## 2021-08-20 RX ORDER — NABUMETONE 750 MG
1 TABLET ORAL
Qty: 0 | Refills: 0 | DISCHARGE

## 2021-08-20 RX ORDER — SODIUM CHLORIDE 9 MG/ML
1000 INJECTION, SOLUTION INTRAVENOUS
Refills: 0 | Status: DISCONTINUED | OUTPATIENT
Start: 2021-08-20 | End: 2021-09-03

## 2021-08-20 NOTE — PRE PROCEDURE NOTE - PRE PROCEDURE EVALUATION
Interventional Radiology    HPI: 66 yr old reports noting a right neck mass one month ago, biopsy noted B- cell lymphoma,  s/p 8/18 excisional biopsy of right neck node,  presents to Interventional Radiology on 8/20 for port placement for chemotherapy    NPO:  yes    Allergies: IV Contrast (Other)    Medications (Abx/Cardiac/Anticoagulation/Blood Products)      Data:    107.5  T(C): 36.7  HR: 57  BP: 151/88  RR: 16  SpO2: 98%    B-cell lymphoma    FH: stomach cancer (Sibling)    Handoff    Hypercholesterolemia    History of Transient Ischemic Attack    OA (osteoarthritis)    GERD (gastroesophageal reflux disease)    Unspecified B-cell lymphoma, unspecified site    OLEGARIO (obstructive sleep apnea)    Obese    Total Hip Replacement    S/P TKR (Total Knee Replacement)    S/P Appendectomy    Hypercholesterolemia    H/O cervical spine surgery    H/O arthroscopy    History of back surgery    [id number]        Exam  General: No acute distress  Chest: Non labored breathing          Imaging:     Plan: 66 yr old reports noting a right neck mass one month ago, biopsy noted B- cell lymphoma,  s/p 8/18 excisional biopsy of right neck node,  presents to Interventional Radiology on 8/20 for port placement for chemotherapy    -Risks/Benefits/alternatives explained with the patient and/or healthcare proxy and witnessed informed consent obtained.

## 2021-08-20 NOTE — ASU DISCHARGE PLAN (ADULT/PEDIATRIC) - ASU DC SPECIAL INSTRUCTIONSFT
Chest Port Placement    Discharge Instructions  - You have had a chest port implated in your chest.   - The port is ready for use.  - You may shower in 48 hours. No soaking or swimming for 2 weeks or until the site is completely healed.  - Keep the area covered and dry for the next 7 days. It may be removed by a chemotherapy nurse as needed for treatment.  - Do not perform any heavy lifting or put tension on the area for the next week or until the site is healed.  - You may resume your normal diet.  - You may resume your normal medications however you should wait 48 hours before restarting aspirin, plavix, or blood thinners.  - It is normal to experience some pain over the site for the next few days. You may take apply ice to the area (20 minutes on, 20 minutes off) and take Tylenol for that pain. Do not take more frequently than every 6 hours and do not exceed more than 3000mg of Tylenol in a 24 hour period.    - You were given conscious sedation which may make you drowsy, therefore you need someone to stay with you until the morning following the procedure.  - Do not drive, engage in heavy lifting or strenuous activity, or drink any alcoholic beverages for the next 24 hours.   - You may resume normal activity in 24 hours.    Notify your primary physician and/or Interventional Radiology IMMEDIATELY if you experience any of the following       - Fever of 100.4F or 38C       - Chills or Rigors/ Shakes       - Swelling and/or Redness in the area around the port       - Worsening Pain       - Blood soaked bandages or worsening bleeding       - Lightheadedness and/or dizziness upon standing       - Chest Pain/ Tightness       - Shortness of Breath       - Difficulty walking    If you have a problem that you believe requires IMMEDIATE attention, please go to your NEAREST Emergency Room. If you believe your problem can safely wait until you speak to a physician, please call Interventional Radiology for any concerns.    During Normal Weekday Business Hours- You can contact the Interventional Radiology department during normal business hours via telephone.  During Evenings and Weekends- If you need to contact Interventional Radiology during off hours, do so by calling the hospital and requesting to be connected to the Interventional Radiologist on call.

## 2021-08-20 NOTE — PRE-ANESTHESIA EVALUATION ADULT - NSANTHPMHFT_GEN_ALL_CORE
66 yr old reports noting a right neck mass one month ago, biopsy noted B- cell lymphoma now scheduled for excisional biopsy of right neck node, patient reports increase in size, mild discomfort, chocking sensation when swallowing and irritation with coughing here for port placement for chemo.

## 2021-08-23 ENCOUNTER — OUTPATIENT (OUTPATIENT)
Dept: OUTPATIENT SERVICES | Facility: HOSPITAL | Age: 66
LOS: 1 days | End: 2021-08-23

## 2021-08-23 ENCOUNTER — APPOINTMENT (OUTPATIENT)
Dept: CV DIAGNOSITCS | Facility: HOSPITAL | Age: 66
End: 2021-08-23
Payer: MEDICARE

## 2021-08-23 ENCOUNTER — TRANSCRIPTION ENCOUNTER (OUTPATIENT)
Age: 66
End: 2021-08-23

## 2021-08-23 DIAGNOSIS — Z98.890 OTHER SPECIFIED POSTPROCEDURAL STATES: Chronic | ICD-10-CM

## 2021-08-23 DIAGNOSIS — C85.10 UNSPECIFIED B-CELL LYMPHOMA, UNSPECIFIED SITE: ICD-10-CM

## 2021-08-23 PROCEDURE — 93306 TTE W/DOPPLER COMPLETE: CPT | Mod: 26

## 2021-08-24 ENCOUNTER — OUTPATIENT (OUTPATIENT)
Dept: OUTPATIENT SERVICES | Facility: HOSPITAL | Age: 66
LOS: 1 days | Discharge: HOME | End: 2021-08-24

## 2021-08-24 DIAGNOSIS — C83.10 MANTLE CELL LYMPHOMA, UNSPECIFIED SITE: ICD-10-CM

## 2021-08-24 DIAGNOSIS — Z98.890 OTHER SPECIFIED POSTPROCEDURAL STATES: Chronic | ICD-10-CM

## 2021-08-25 ENCOUNTER — APPOINTMENT (OUTPATIENT)
Dept: OTOLARYNGOLOGY | Facility: CLINIC | Age: 66
End: 2021-08-25
Payer: MEDICARE

## 2021-08-25 VITALS — TEMPERATURE: 97.6 F

## 2021-08-25 PROCEDURE — 99024 POSTOP FOLLOW-UP VISIT: CPT

## 2021-08-25 NOTE — REASON FOR VISIT
[Post-Operative Visit] : a post-operative visit [FreeTextEntry2] : s/p excision biopsy right neck node on 8/18/2021

## 2021-08-25 NOTE — HISTORY OF PRESENT ILLNESS
[de-identified] : Mr. Mercado is s/p excision biopsy right neck node on 8/18/2021. Presents today for incision assessment and suture removal. Reports feeling well. Denies fever, pain, dysphagia, dysphonia and or dyspnea  [None] : No associated symptoms are reported.

## 2021-08-26 ENCOUNTER — APPOINTMENT (OUTPATIENT)
Dept: HEMATOLOGY ONCOLOGY | Facility: CLINIC | Age: 66
End: 2021-08-26
Payer: MEDICARE

## 2021-08-26 VITALS
BODY MASS INDEX: 32.69 KG/M2 | OXYGEN SATURATION: 97 % | SYSTOLIC BLOOD PRESSURE: 120 MMHG | DIASTOLIC BLOOD PRESSURE: 70 MMHG | WEIGHT: 241 LBS | HEART RATE: 66 BPM

## 2021-08-26 LAB
HEMATOPATHOLOGY REPORT: SIGNIFICANT CHANGE UP
TM INTERPRETATION: SIGNIFICANT CHANGE UP

## 2021-08-26 PROCEDURE — 85025 COMPLETE CBC W/AUTO DIFF WBC: CPT | Mod: GC

## 2021-08-26 PROCEDURE — 99215 OFFICE O/P EST HI 40 MIN: CPT | Mod: GC

## 2021-08-26 NOTE — END OF VISIT
[Time Spent: ___ minutes] : I have spent [unfilled] minutes of time on the encounter. [FreeTextEntry3] : Patient seen and case discussed with LAURA Sprague. I agree with above and have edited the note where needed.\par

## 2021-08-26 NOTE — ASSESSMENT
[FreeTextEntry1] : 66 year old male presenting for initial consultation for recent diagnosis of DLBCL, non-germinal center subtype. Patient had a sonoguided core biopsy of right lymph node of neck on 7/28/21 with pathology resulting in \par CD5, CD10 negative lymphoma, favor Diffuse large B-cell lymphoma, non-Germinal center subtype. Given the pathology results, he will go for an excisional biopsy next week. We discussed that we can start prednisone after the biopsy which will improve his symptoms. PET/CT demonstrated the known R cervical node (SUV 30s) and possibly a 0.9 cm perigastric LN (SUV 5).  PT under went excisional biopsy of right neck LN on 8/18. Path was consistent with DLBCL non-germinal center subtype.\par \par #DLBCL non-germinal center subtype\par - Reviewed path results with patient and family\par - Would recommend R-CHOP for 6 cycles given mild FDG avidity of perigasric LN. Went over side effects of chemo regimen including reactivation of Hep B, infusion site reactions, lower of blood counts, infections, neuropathy, n/v, diarrhea/constipation, hair loss.\par Consent signed today, pt's son and daughter provided translation in Ecuadorean. Pt was given chemo information printed in Ecuadorean - he is agreeable to start therapy\par -Pt is scheduled for Cycle 1 R-CHOP on 8/30/21. \par \par F/u in 2 weeks\par

## 2021-08-26 NOTE — HISTORY OF PRESENT ILLNESS
[de-identified] : Mr. Gregory Mercado is a 66 year old man with PMHx of Us esophagus, history of two knee replacements, hip replacements, neck surgery, arm surgery, and lower back surgery, He takes baclofen, nabumetone, naproxen, and sometimes Tylenol extra strength for pain. He also take omeprazole. Patient presents today for initial consultation for recent diagnosis of DLBCL, non-germinal center subtype. Patient had a sonoguided core biopsy of right lymph node of neck on 7/28/21 with pathology resulting in \par CD5, CD10 negative lymphoma, favor Diffuse large B-cell lymphoma, non-Germinal center subtype.  \par \par Patient reports he has enlarged right neck mass that began growing 1 month ago. He report this to his PCP, who proceeded the patient to have sono guide core biopsy. Patient reports his voice is hoarse and he at times has difficulty swallowing. He denies choking, difficulty breathing. Denies fever/chills, No drenching night sweats. No CP/SOB. No lower extremity edema. Appetite is okay. Weight stable. No abd pain. Has occasional loose stools. No hematuria, dysuria. No recent/recurrent infections. Energy levels stable.\par He is a retired  and is now a full time . [de-identified] : 8/26/21 - Since his last visit, pt underwent excisional biopsy of right neck LN on 8/18. Path was consistent with DLBCL non-germinal center subtype.\par He has no new fevers, chills or night sweats. He has no difficulty swallowing. Pt states he has occasional diarrhea/constipation for the past few weeks.

## 2021-08-26 NOTE — REVIEW OF SYSTEMS
[Hoarseness] : hoarseness [Patient Intake Form Reviewed] : Patient intake form was reviewed [Joint Pain] : joint pain [Muscle Pain] : muscle pain [Swollen Glands] : swollen glands [Negative] : Allergic/Immunologic [de-identified] : occasional numbness in arms

## 2021-08-26 NOTE — PHYSICAL EXAM
[Fully active, able to carry on all pre-disease performance without restriction] : Status 0 - Fully active, able to carry on all pre-disease performance without restriction [Normal] : affect appropriate [de-identified] : right wrist possible ganglion cyst [de-identified] : right cervical LN s/p exicional biopsy - healing scar

## 2021-08-26 NOTE — RESULTS/DATA
[FreeTextEntry1] : CBC done 8/26/21\par wbc- 6.5\par hgb- 15.4\par hct - 46.9\par plt- 271\par alc- 1.66\par anc- 4.39\par \par \par \par \par Final Diagnosis\par 1.  Lymph node, right neck, sono-guided core biopsy:\par - CD5, CD10 negative lymphoma, favor Diffuse large B-cell\par lymphoma, non-Germinal center subtype.\par \par See note and description.\par \par Diagnostic note:  The current right neck lymph node is\par morphologically and immunophenotypically consistent with a CD5,\par CD10 negative lymphoma, favor diffuse large B-cell lymphoma, non-\par germinal center subtype. Additional studies, including molecular\par and cytogenetics/FISH are performed and will be reported in a\par comprehensive addendum.\par \par Dr. Orozco was urgently faxed report on 8/2/21.\par \par Microscopic description:   The histologic sections of the right\par neck lymph node biopsy shows fragmented lymphoid tissue with\par areas of increased atypical larger cells. The larger cells show\par increased nuclear to cytoplasmic ratio, irregular nuclear\par contours and some with prominent nucleoli. No necrosis is present\par and mitoses/apoptosis are frequent.\par \par Immunohistochemical stains for CD3, CD5, CD20, PAX5, CD10, BCL6,\par BCL2, CyclinD1, Ki67, CD23, CD21, CD43, CD79a, MUM1 stains\par performed on formalin fixed paraffin embedded tissue, block 1A.\par \par Neoplastic cells are:  the larger atypical cells\par Positive:  CD20, PAX-5, BCL-6, MUM1, BCL2, p53 and some scattered\par CD30 positive cells, KI-67% is high within the areas of the\par larger cells (70-80%), c-MYC\par Negative: CD3, CD5, CD10, CD23 and CD21 (in the areas of the\par larger cells), ROSE\par Other:  CD3 and CD5 are positive in T-cells\par \par Flow cytometry: Monotypic B-cells (33% of cells), positive for\par kappa, CD19, CD20, FMC-7; negative CD5, CD10, CD23. The findings\par are consistent with CD5 negative, CD10 negative B-cell\par lymphoproliferative disorder/lymphoma.\par \par Cytogenetics/FISH: pending\par \par \par \par \par \par \par BAO UREÑA                 3\par \par \par \par Surgical Final Report\par \par \par \par \par Molecular studies: pending\par \par Verified by: Jennifer Gorman MD\par (Electronic Signature)\par Reported on: 08/02/21 12:38 EDT, [address] [facility],\[place] [address]\par Phone: [phone]   Fax: [phone]\par _________________________________________________________________\par \par PET/CT 8/12/21\par IMPRESSION: Abnormal FDG-PET/CT scan.\par \par 1. FDG-avid right cervical lymphadenopathy corresponds to biopsy-proven lymphoma.\par \par 2. Small FDG-avid perigastric lymph node is indeterminate, possibly lymphoma.\par \par --- End of Report ---\par

## 2021-08-26 NOTE — REASON FOR VISIT
[Lymphoma] : lymphoma [Spouse] : spouse [Family Member] : family member [Follow-Up Visit] : a follow-up visit for

## 2021-08-27 DIAGNOSIS — C85.10 UNSPECIFIED B-CELL LYMPHOMA, UNSPECIFIED SITE: ICD-10-CM

## 2021-08-27 DIAGNOSIS — Z45.2 ENCOUNTER FOR ADJUSTMENT AND MANAGEMENT OF VASCULAR ACCESS DEVICE: ICD-10-CM

## 2021-08-27 LAB
ALBUMIN SERPL ELPH-MCNC: 4.2 G/DL
ALP BLD-CCNC: 91 U/L
ALT SERPL-CCNC: 19 U/L
ANION GAP SERPL CALC-SCNC: 12 MMOL/L
AST SERPL-CCNC: 19 U/L
BILIRUB SERPL-MCNC: 0.5 MG/DL
BUN SERPL-MCNC: 12 MG/DL
CALCIUM SERPL-MCNC: 9.2 MG/DL
CHLORIDE SERPL-SCNC: 104 MMOL/L
CO2 SERPL-SCNC: 25 MMOL/L
CREAT SERPL-MCNC: 1.13 MG/DL
GLUCOSE SERPL-MCNC: 106 MG/DL
LDH SERPL-CCNC: 190 U/L
MAGNESIUM SERPL-MCNC: 2.1 MG/DL
PHOSPHATE SERPL-MCNC: 2.8 MG/DL
POTASSIUM SERPL-SCNC: 4.2 MMOL/L
PROT SERPL-MCNC: 7 G/DL
SODIUM SERPL-SCNC: 140 MMOL/L
URATE SERPL-MCNC: 7 MG/DL

## 2021-08-30 ENCOUNTER — APPOINTMENT (OUTPATIENT)
Dept: HEMATOLOGY ONCOLOGY | Facility: CLINIC | Age: 66
End: 2021-08-30
Payer: MEDICARE

## 2021-08-30 ENCOUNTER — APPOINTMENT (OUTPATIENT)
Dept: INFUSION THERAPY | Facility: HOSPITAL | Age: 66
End: 2021-08-30

## 2021-08-30 ENCOUNTER — NON-APPOINTMENT (OUTPATIENT)
Age: 66
End: 2021-08-30

## 2021-08-30 ENCOUNTER — RESULT REVIEW (OUTPATIENT)
Age: 66
End: 2021-08-30

## 2021-08-30 DIAGNOSIS — R11.2 NAUSEA WITH VOMITING, UNSPECIFIED: ICD-10-CM

## 2021-08-30 DIAGNOSIS — Z51.11 ENCOUNTER FOR ANTINEOPLASTIC CHEMOTHERAPY: ICD-10-CM

## 2021-08-30 DIAGNOSIS — E86.0 DEHYDRATION: ICD-10-CM

## 2021-08-30 LAB
BASOPHILS # BLD AUTO: 0.05 K/UL — SIGNIFICANT CHANGE UP (ref 0–0.2)
BASOPHILS NFR BLD AUTO: 0.7 % — SIGNIFICANT CHANGE UP (ref 0–2)
EOSINOPHIL # BLD AUTO: 0.07 K/UL — SIGNIFICANT CHANGE UP (ref 0–0.5)
EOSINOPHIL NFR BLD AUTO: 1 % — SIGNIFICANT CHANGE UP (ref 0–6)
HCT VFR BLD CALC: 44.1 % — SIGNIFICANT CHANGE UP (ref 39–50)
HGB BLD-MCNC: 14.9 G/DL — SIGNIFICANT CHANGE UP (ref 13–17)
IMM GRANULOCYTES NFR BLD AUTO: 0.6 % — SIGNIFICANT CHANGE UP (ref 0–1.5)
LYMPHOCYTES # BLD AUTO: 1.39 K/UL — SIGNIFICANT CHANGE UP (ref 1–3.3)
LYMPHOCYTES # BLD AUTO: 19.9 % — SIGNIFICANT CHANGE UP (ref 13–44)
MCHC RBC-ENTMCNC: 29.3 PG — SIGNIFICANT CHANGE UP (ref 27–34)
MCHC RBC-ENTMCNC: 33.8 G/DL — SIGNIFICANT CHANGE UP (ref 32–36)
MCV RBC AUTO: 86.6 FL — SIGNIFICANT CHANGE UP (ref 80–100)
MONOCYTES # BLD AUTO: 0.57 K/UL — SIGNIFICANT CHANGE UP (ref 0–0.9)
MONOCYTES NFR BLD AUTO: 8.2 % — SIGNIFICANT CHANGE UP (ref 2–14)
NEUTROPHILS # BLD AUTO: 4.87 K/UL — SIGNIFICANT CHANGE UP (ref 1.8–7.4)
NEUTROPHILS NFR BLD AUTO: 69.6 % — SIGNIFICANT CHANGE UP (ref 43–77)
NRBC # BLD: 0 /100 WBCS — SIGNIFICANT CHANGE UP (ref 0–0)
PLATELET # BLD AUTO: 274 K/UL — SIGNIFICANT CHANGE UP (ref 150–400)
RBC # BLD: 5.09 M/UL — SIGNIFICANT CHANGE UP (ref 4.2–5.8)
RBC # FLD: 12.8 % — SIGNIFICANT CHANGE UP (ref 10.3–14.5)
WBC # BLD: 6.99 K/UL — SIGNIFICANT CHANGE UP (ref 3.8–10.5)
WBC # FLD AUTO: 6.99 K/UL — SIGNIFICANT CHANGE UP (ref 3.8–10.5)

## 2021-08-30 PROCEDURE — 99213 OFFICE O/P EST LOW 20 MIN: CPT

## 2021-08-31 ENCOUNTER — NON-APPOINTMENT (OUTPATIENT)
Age: 66
End: 2021-08-31

## 2021-09-08 ENCOUNTER — NON-APPOINTMENT (OUTPATIENT)
Age: 66
End: 2021-09-08

## 2021-09-09 ENCOUNTER — APPOINTMENT (OUTPATIENT)
Dept: HEMATOLOGY ONCOLOGY | Facility: CLINIC | Age: 66
End: 2021-09-09
Payer: MEDICARE

## 2021-09-09 VITALS
BODY MASS INDEX: 32.55 KG/M2 | HEART RATE: 78 BPM | DIASTOLIC BLOOD PRESSURE: 70 MMHG | OXYGEN SATURATION: 99 % | SYSTOLIC BLOOD PRESSURE: 118 MMHG | WEIGHT: 240 LBS

## 2021-09-09 PROCEDURE — 85025 COMPLETE CBC W/AUTO DIFF WBC: CPT | Mod: GC

## 2021-09-09 PROCEDURE — 99214 OFFICE O/P EST MOD 30 MIN: CPT | Mod: GC

## 2021-09-09 NOTE — PHYSICAL EXAM
[Fully active, able to carry on all pre-disease performance without restriction] : Status 0 - Fully active, able to carry on all pre-disease performance without restriction [Normal] : affect appropriate [de-identified] : right cervical LN s/p exicional biopsy - healing scar [de-identified] : right wrist possible ganglion cyst

## 2021-09-09 NOTE — ASSESSMENT
[FreeTextEntry1] : 66 year old male presenting for initial consultation for recent diagnosis of DLBCL, non-germinal center subtype. Patient had a sonoguided core biopsy of right lymph node of neck on 7/28/21 with pathology resulting in \par CD5, CD10 negative lymphoma, favor Diffuse large B-cell lymphoma, non-Germinal center subtype. Given the pathology results, he will go for an excisional biopsy next week. We discussed that we can start prednisone after the biopsy which will improve his symptoms. PET/CT demonstrated the known R cervical node (SUV 30s) and possibly a 0.9 cm perigastric LN (SUV 5).  PT under went excisional biopsy of right neck LN on 8/18. Path was consistent with DLBCL non-germinal center subtype.\par \par #DLBCL non-germinal center subtype\par - Reviewed path results with patient and family\par - Would recommend R-CHOP for 6 cycles given mild FDG avidity of perigasric LN. Went over side effects of chemo regimen including reactivation of Hep B, infusion site reactions, lower of blood counts, infections, neuropathy, n/v, diarrhea/constipation, hair loss.\par Consent signed today, pt's son and daughter provided translation in Haitian. Pt was given chemo information printed in Haitian - he is agreeable to start therapy\par -Pt is s/p Cycle 1 R-CHOP on 8/30/21 and Fulphila given at home via nursing care.\par - Will send First Mouth wash for oral discomfort and Simethicone for lower abdominal discomfort. \par \par #Hx Carpal Tunnel syndrome\par -suspect joint pain related to this and not to lymphoma/chemotherapy \par -will follow up with hand surgery \par \par F/u in 1 week\par

## 2021-09-09 NOTE — REASON FOR VISIT
[Follow-Up Visit] : a follow-up visit for [Lymphoma] : lymphoma [Spouse] : spouse [Family Member] : family member

## 2021-09-09 NOTE — REVIEW OF SYSTEMS
[Patient Intake Form Reviewed] : Patient intake form was reviewed [Hoarseness] : hoarseness [Joint Pain] : joint pain [Muscle Pain] : muscle pain [Swollen Glands] : swollen glands [Negative] : Allergic/Immunologic [FreeTextEntry7] : occasional abdominal discomfort [de-identified] : occasional numbness in arms; has b/l hand tingling

## 2021-09-09 NOTE — RESULTS/DATA
[FreeTextEntry1] : CBC done  9/9/21\par wbc- 3.5\par hgb- 14.5\par hct - 43.8\par plt- 185\par alc- 1.20\par anc- 2.00\par \par \par \par \par Final Diagnosis\par 1.  Lymph node, right neck, sono-guided core biopsy:\par - CD5, CD10 negative lymphoma, favor Diffuse large B-cell\par lymphoma, non-Germinal center subtype.\par \par See note and description.\par \par Diagnostic note:  The current right neck lymph node is\par morphologically and immunophenotypically consistent with a CD5,\par CD10 negative lymphoma, favor diffuse large B-cell lymphoma, non-\par germinal center subtype. Additional studies, including molecular\par and cytogenetics/FISH are performed and will be reported in a\par comprehensive addendum.\par \par Dr. Orozco was urgently faxed report on 8/2/21.\par \par Microscopic description:   The histologic sections of the right\par neck lymph node biopsy shows fragmented lymphoid tissue with\par areas of increased atypical larger cells. The larger cells show\par increased nuclear to cytoplasmic ratio, irregular nuclear\par contours and some with prominent nucleoli. No necrosis is present\par and mitoses/apoptosis are frequent.\par \par Immunohistochemical stains for CD3, CD5, CD20, PAX5, CD10, BCL6,\par BCL2, CyclinD1, Ki67, CD23, CD21, CD43, CD79a, MUM1 stains\par performed on formalin fixed paraffin embedded tissue, block 1A.\par \par Neoplastic cells are:  the larger atypical cells\par Positive:  CD20, PAX-5, BCL-6, MUM1, BCL2, p53 and some scattered\par CD30 positive cells, KI-67% is high within the areas of the\par larger cells (70-80%), c-MYC\par Negative: CD3, CD5, CD10, CD23 and CD21 (in the areas of the\par larger cells), ROSE\par Other:  CD3 and CD5 are positive in T-cells\par \par Flow cytometry: Monotypic B-cells (33% of cells), positive for\par kappa, CD19, CD20, FMC-7; negative CD5, CD10, CD23. The findings\par are consistent with CD5 negative, CD10 negative B-cell\par lymphoproliferative disorder/lymphoma.\par \par Cytogenetics/FISH: pending\par \par \par \par \par \par \par BAO UREÑA                 3\par \par \par \par Surgical Final Report\par \par \par \par \par Molecular studies: pending\par \par Verified by: Jennifer Gorman MD\par (Electronic Signature)\par Reported on: 08/02/21 12:38 EDT, [address] [facility] [address],\[place] [address]\par Phone: [phone]   Fax: [phone]\par _________________________________________________________________\par \par PET/CT 8/12/21\par IMPRESSION: Abnormal FDG-PET/CT scan.\par \par 1. FDG-avid right cervical lymphadenopathy corresponds to biopsy-proven lymphoma.\par \par 2. Small FDG-avid perigastric lymph node is indeterminate, possibly lymphoma.\par \par --- End of Report ---\par

## 2021-09-09 NOTE — HISTORY OF PRESENT ILLNESS
[de-identified] : Mr. Gregory Mercado is a 66 year old man with PMHx of Us esophagus, history of two knee replacements, hip replacements, neck surgery, arm surgery, and lower back surgery, He takes baclofen, nabumetone, naproxen, and sometimes Tylenol extra strength for pain. He also take omeprazole. Patient presents today for initial consultation for recent diagnosis of DLBCL, non-germinal center subtype. Patient had a sonoguided core biopsy of right lymph node of neck on 7/28/21 with pathology resulting in \par CD5, CD10 negative lymphoma, favor Diffuse large B-cell lymphoma, non-Germinal center subtype.  \par \par Patient reports he has enlarged right neck mass that began growing 1 month ago. He report this to his PCP, who proceeded the patient to have sono guide core biopsy. Patient reports his voice is hoarse and he at times has difficulty swallowing. He denies choking, difficulty breathing. Denies fever/chills, No drenching night sweats. No CP/SOB. No lower extremity edema. Appetite is okay. Weight stable. No abd pain. Has occasional loose stools. No hematuria, dysuria. No recent/recurrent infections. Energy levels stable.\par He is a retired  and is now a full time . [de-identified] : 8/26/21 - Pt is now s/p cycle 1 of R-CHOP\par He has no new fevers, chills or night sweats. He has no difficulty swallowing. Pt denies constipation or nausea after chemo. He has a hx of carpal tunnel (?) currently wearing a brace on his right wrist, is c/o of b/l hand tingling- which he has had before starting therapy as well. He notes new mouth irritation, especially near the lower gums - has tried Listerine with no change. Pt states he has lower abdominal discomfort occasionally. Has hx of GERD on omeprazole currently

## 2021-09-10 LAB
ALBUMIN SERPL ELPH-MCNC: 4.3 G/DL
ALP BLD-CCNC: 98 U/L
ALT SERPL-CCNC: 28 U/L
ANION GAP SERPL CALC-SCNC: 14 MMOL/L
AST SERPL-CCNC: 17 U/L
BILIRUB SERPL-MCNC: 0.2 MG/DL
BUN SERPL-MCNC: 12 MG/DL
CALCIUM SERPL-MCNC: 9.2 MG/DL
CHLORIDE SERPL-SCNC: 104 MMOL/L
CO2 SERPL-SCNC: 22 MMOL/L
CREAT SERPL-MCNC: 0.91 MG/DL
GLUCOSE SERPL-MCNC: 114 MG/DL
LDH SERPL-CCNC: 171 U/L
MAGNESIUM SERPL-MCNC: 2 MG/DL
PHOSPHATE SERPL-MCNC: 2.7 MG/DL
POTASSIUM SERPL-SCNC: 4.3 MMOL/L
PROT SERPL-MCNC: 7 G/DL
SODIUM SERPL-SCNC: 140 MMOL/L
URATE SERPL-MCNC: 6.2 MG/DL

## 2021-09-14 LAB — CHROM ANALY OVERALL INTERP SPEC-IMP: SIGNIFICANT CHANGE UP

## 2021-09-15 LAB
BASOPHILS # BLD AUTO: 0.09 K/UL
BASOPHILS NFR BLD AUTO: 1.1 %
EOSINOPHIL # BLD AUTO: 0.02 K/UL
EOSINOPHIL NFR BLD AUTO: 0.2 %
HCT VFR BLD CALC: 45.1 %
HGB BLD-MCNC: 15.1 G/DL
IMM GRANULOCYTES NFR BLD AUTO: 3.3 %
LYMPHOCYTES # BLD AUTO: 1.02 K/UL
LYMPHOCYTES NFR BLD AUTO: 12.4 %
MAN DIFF?: NORMAL
MCHC RBC-ENTMCNC: 30.3 PG
MCHC RBC-ENTMCNC: 33.5 GM/DL
MCV RBC AUTO: 90.6 FL
MONOCYTES # BLD AUTO: 0.62 K/UL
MONOCYTES NFR BLD AUTO: 7.5 %
NEUTROPHILS # BLD AUTO: 6.2 K/UL
NEUTROPHILS NFR BLD AUTO: 75.5 %
PLATELET # BLD AUTO: 261 K/UL
RBC # BLD: 4.98 M/UL
RBC # FLD: 13.5 %
WBC # FLD AUTO: 8.22 K/UL

## 2021-09-16 ENCOUNTER — APPOINTMENT (OUTPATIENT)
Dept: HEMATOLOGY ONCOLOGY | Facility: CLINIC | Age: 66
End: 2021-09-16
Payer: MEDICARE

## 2021-09-16 VITALS
HEART RATE: 88 BPM | TEMPERATURE: 97.7 F | DIASTOLIC BLOOD PRESSURE: 75 MMHG | SYSTOLIC BLOOD PRESSURE: 128 MMHG | OXYGEN SATURATION: 97 % | RESPIRATION RATE: 18 BRPM

## 2021-09-16 LAB
ALBUMIN SERPL ELPH-MCNC: 4.5 G/DL
ALP BLD-CCNC: 98 U/L
ALT SERPL-CCNC: 22 U/L
ANION GAP SERPL CALC-SCNC: 18 MMOL/L
AST SERPL-CCNC: 18 U/L
BILIRUB SERPL-MCNC: 0.3 MG/DL
BUN SERPL-MCNC: 12 MG/DL
CALCIUM SERPL-MCNC: 9.5 MG/DL
CHLORIDE SERPL-SCNC: 105 MMOL/L
CO2 SERPL-SCNC: 20 MMOL/L
CREAT SERPL-MCNC: 1.06 MG/DL
GLUCOSE SERPL-MCNC: 112 MG/DL
LDH SERPL-CCNC: 203 U/L
MAGNESIUM SERPL-MCNC: 2 MG/DL
PHOSPHATE SERPL-MCNC: 2.6 MG/DL
POTASSIUM SERPL-SCNC: 4.3 MMOL/L
PROT SERPL-MCNC: 7.1 G/DL
SODIUM SERPL-SCNC: 142 MMOL/L
URATE SERPL-MCNC: 6.9 MG/DL

## 2021-09-16 PROCEDURE — 99214 OFFICE O/P EST MOD 30 MIN: CPT | Mod: GC

## 2021-09-16 NOTE — REVIEW OF SYSTEMS
[Patient Intake Form Reviewed] : Patient intake form was reviewed [Joint Pain] : joint pain [Muscle Pain] : muscle pain [Swollen Glands] : swollen glands [Negative] : Allergic/Immunologic [FreeTextEntry7] : occasional abdominal discomfort [de-identified] : occasional numbness in arms; has b/l hand tingling [de-identified] : cervical enlarged lymph nodes has signifcantly decreased in size since C1 of chemo

## 2021-09-16 NOTE — END OF VISIT
[FreeTextEntry3] : Patient seen and case discussed with LAURA Bolanos. I agree with above and have edited the note where needed.\par

## 2021-09-16 NOTE — HISTORY OF PRESENT ILLNESS
[de-identified] : Mr. Gregory Mercado is a 66 year old man with PMHx of Us esophagus, history of two knee replacements, hip replacements, neck surgery, arm surgery, and lower back surgery, He takes baclofen, nabumetone, naproxen, and sometimes Tylenol extra strength for pain. He also take omeprazole. Patient presents today for initial consultation for recent diagnosis of DLBCL, non-germinal center subtype. Patient had a sonoguided core biopsy of right lymph node of neck on 7/28/21 with pathology resulting in \par CD5, CD10 negative lymphoma, favor Diffuse large B-cell lymphoma, non-Germinal center subtype.  \par \par Patient reports he has enlarged right neck mass that began growing 1 month ago. He report this to his PCP, who proceeded the patient to have sono guide core biopsy. Patient reports his voice is hoarse and he at times has difficulty swallowing. He denies choking, difficulty breathing. Denies fever/chills, No drenching night sweats. No CP/SOB. No lower extremity edema. Appetite is okay. Weight stable. No abd pain. Has occasional loose stools. No hematuria, dysuria. No recent/recurrent infections. Energy levels stable.\par He is a retired  and is now a full time . [de-identified] : 9/16/21- Pt is now s/p cycle 1 of R-CHOP and tolerated treatment.\par He has no new fevers, chills or night sweats. He has no difficulty swallowing. Pt denies constipation or nausea after chemo. He has a hx of carpal tunnel (?) currently wearing a brace on his right wrist, is c/o of b/l hand tingling- which he has had before starting therapy as well. His oral discomfort, likely due to mouth sore has eased up after use of magic mouthwash. In addition his lower abd discomfort has improved with simethicone.No recent/recurrent infections. Reports he has occasional jaw pain-which started before chemo -we advised to follow up with dentist, as it could be related to TMJ. Otherwise patient feels very well.

## 2021-09-16 NOTE — ASSESSMENT
[FreeTextEntry1] : 66 year old male presenting for initial consultation for recent diagnosis of DLBCL, non-germinal center subtype. Patient had a sonoguided core biopsy of right lymph node of neck on 7/28/21 with pathology resulting in \par CD5, CD10 negative lymphoma, favor Diffuse large B-cell lymphoma, non-Germinal center subtype. Given the pathology results, he will go for an excisional biopsy next week. We discussed that we can start prednisone after the biopsy which will improve his symptoms. PET/CT demonstrated the known R cervical node (SUV 30s) and possibly a 0.9 cm perigastric LN (SUV 5).  PT under went excisional biopsy of right neck LN on 8/18. Path was consistent with DLBCL non-germinal center subtype.\par \par #DLBCL non-germinal center subtype\par - Reviewed path results with patient and family\par - Would recommend R-CHOP for 6 cycles given mild FDG avidity of perigastric LN. Went over side effects of chemo regimen including reactivation of Hep B, infusion site reactions, lower of blood counts, infections, neuropathy, n/v, diarrhea/constipation, hair loss.\par Consent signed today, pt's son and daughter provided translation in Danish. Pt was given chemo information printed in Danish - he is agreeable to start therapy\par -Pt is s/p Cycle 1 R-CHOP on 8/30/21 and Fulphila given at home via nursing care.\par -C2 due on 9/20/21, and will repeat PET/CT or CT scans 2 weeks after C2\par - c/w First Mouth wash for oral discomfort and Simethicone for lower abdominal discomfort. \par \par #Hx Carpal Tunnel syndrome\par -suspect joint pain related to this and not to lymphoma/chemotherapy \par -will follow up with hand surgery \par \par Follow up 3 weeks after C2, sooner PRN\par

## 2021-09-16 NOTE — RESULTS/DATA
[FreeTextEntry1] : CBC done  9/14/21 at [facility]\par wbc- 8.22\par hgb- 15.1\par hct - 45.1\par plt- 261\par alc- 1.02\par anc- 6.20\par \par \par \par \par Final Diagnosis\par 1.  Lymph node, right neck, sono-guided core biopsy:\par - CD5, CD10 negative lymphoma, favor Diffuse large B-cell\par lymphoma, non-Germinal center subtype.\par \par See note and description.\par \par Diagnostic note:  The current right neck lymph node is\par morphologically and immunophenotypically consistent with a CD5,\par CD10 negative lymphoma, favor diffuse large B-cell lymphoma, non-\par germinal center subtype. Additional studies, including molecular\par and cytogenetics/FISH are performed and will be reported in a\par comprehensive addendum.\par \par Dr. Orozco was urgently faxed report on 8/2/21.\par \par Microscopic description:   The histologic sections of the right\par neck lymph node biopsy shows fragmented lymphoid tissue with\par areas of increased atypical larger cells. The larger cells show\par increased nuclear to cytoplasmic ratio, irregular nuclear\par contours and some with prominent nucleoli. No necrosis is present\par and mitoses/apoptosis are frequent.\par \par Immunohistochemical stains for CD3, CD5, CD20, PAX5, CD10, BCL6,\par BCL2, CyclinD1, Ki67, CD23, CD21, CD43, CD79a, MUM1 stains\par performed on formalin fixed paraffin embedded tissue, block 1A.\par \par Neoplastic cells are:  the larger atypical cells\par Positive:  CD20, PAX-5, BCL-6, MUM1, BCL2, p53 and some scattered\par CD30 positive cells, KI-67% is high within the areas of the\par larger cells (70-80%), c-MYC\par Negative: CD3, CD5, CD10, CD23 and CD21 (in the areas of the\par larger cells), ROSE\par Other:  CD3 and CD5 are positive in T-cells\par \par Flow cytometry: Monotypic B-cells (33% of cells), positive for\par kappa, CD19, CD20, FMC-7; negative CD5, CD10, CD23. The findings\par are consistent with CD5 negative, CD10 negative B-cell\par lymphoproliferative disorder/lymphoma.\par \par Cytogenetics/FISH: pending\par \par \par \par \par \par \par KOKO GARCIA BAO                 3\par \par \par \par Surgical Final Report\par \par \par \par \par Molecular studies: pending\par \par Verified by: Jennifer Gorman MD\par (Electronic Signature)\par Reported on: 08/02/21 12:38 EDT, [address] [facility],\[place] [address]\par Phone: [phone]   Fax: [phone]\par _________________________________________________________________\par \par PET/CT 8/12/21\par IMPRESSION: Abnormal FDG-PET/CT scan.\par \par 1. FDG-avid right cervical lymphadenopathy corresponds to biopsy-proven lymphoma.\par \par 2. Small FDG-avid perigastric lymph node is indeterminate, possibly lymphoma.\par \par --- End of Report ---\par

## 2021-09-16 NOTE — PHYSICAL EXAM
[Fully active, able to carry on all pre-disease performance without restriction] : Status 0 - Fully active, able to carry on all pre-disease performance without restriction [Normal] : affect appropriate [de-identified] : right cervical LN s/p exicional biopsy - healing scar-cervical enlarged lymph nodes has signifcantly decreased in size since C1 of chemo [de-identified] : right wrist possible ganglion cyst

## 2021-09-20 ENCOUNTER — APPOINTMENT (OUTPATIENT)
Dept: INFUSION THERAPY | Facility: HOSPITAL | Age: 66
End: 2021-09-20

## 2021-09-20 ENCOUNTER — RESULT REVIEW (OUTPATIENT)
Age: 66
End: 2021-09-20

## 2021-09-20 LAB
BASOPHILS # BLD AUTO: 0.09 K/UL — SIGNIFICANT CHANGE UP (ref 0–0.2)
BASOPHILS NFR BLD AUTO: 1.2 % — SIGNIFICANT CHANGE UP (ref 0–2)
EOSINOPHIL # BLD AUTO: 0.01 K/UL — SIGNIFICANT CHANGE UP (ref 0–0.5)
EOSINOPHIL NFR BLD AUTO: 0.1 % — SIGNIFICANT CHANGE UP (ref 0–6)
HCT VFR BLD CALC: 43.2 % — SIGNIFICANT CHANGE UP (ref 39–50)
HGB BLD-MCNC: 14.7 G/DL — SIGNIFICANT CHANGE UP (ref 13–17)
IMM GRANULOCYTES NFR BLD AUTO: 2 % — HIGH (ref 0–1.5)
LYMPHOCYTES # BLD AUTO: 1.15 K/UL — SIGNIFICANT CHANGE UP (ref 1–3.3)
LYMPHOCYTES # BLD AUTO: 15.1 % — SIGNIFICANT CHANGE UP (ref 13–44)
MCHC RBC-ENTMCNC: 29.5 PG — SIGNIFICANT CHANGE UP (ref 27–34)
MCHC RBC-ENTMCNC: 34 G/DL — SIGNIFICANT CHANGE UP (ref 32–36)
MCV RBC AUTO: 86.7 FL — SIGNIFICANT CHANGE UP (ref 80–100)
MONOCYTES # BLD AUTO: 0.74 K/UL — SIGNIFICANT CHANGE UP (ref 0–0.9)
MONOCYTES NFR BLD AUTO: 9.7 % — SIGNIFICANT CHANGE UP (ref 2–14)
NEUTROPHILS # BLD AUTO: 5.5 K/UL — SIGNIFICANT CHANGE UP (ref 1.8–7.4)
NEUTROPHILS NFR BLD AUTO: 71.9 % — SIGNIFICANT CHANGE UP (ref 43–77)
NRBC # BLD: 0 /100 WBCS — SIGNIFICANT CHANGE UP (ref 0–0)
PLATELET # BLD AUTO: 364 K/UL — SIGNIFICANT CHANGE UP (ref 150–400)
RBC # BLD: 4.98 M/UL — SIGNIFICANT CHANGE UP (ref 4.2–5.8)
RBC # FLD: 13.4 % — SIGNIFICANT CHANGE UP (ref 10.3–14.5)
WBC # BLD: 7.64 K/UL — SIGNIFICANT CHANGE UP (ref 3.8–10.5)
WBC # FLD AUTO: 7.64 K/UL — SIGNIFICANT CHANGE UP (ref 3.8–10.5)

## 2021-09-21 ENCOUNTER — NON-APPOINTMENT (OUTPATIENT)
Age: 66
End: 2021-09-21

## 2021-09-22 ENCOUNTER — EMERGENCY (EMERGENCY)
Facility: HOSPITAL | Age: 66
LOS: 1 days | Discharge: HOME | End: 2021-09-22
Admitting: EMERGENCY MEDICINE
Payer: MEDICARE

## 2021-09-22 VITALS
DIASTOLIC BLOOD PRESSURE: 90 MMHG | OXYGEN SATURATION: 100 % | HEART RATE: 80 BPM | RESPIRATION RATE: 20 BRPM | SYSTOLIC BLOOD PRESSURE: 168 MMHG

## 2021-09-22 VITALS
HEART RATE: 85 BPM | DIASTOLIC BLOOD PRESSURE: 100 MMHG | OXYGEN SATURATION: 98 % | SYSTOLIC BLOOD PRESSURE: 150 MMHG | TEMPERATURE: 98 F | HEIGHT: 78 IN | RESPIRATION RATE: 18 BRPM

## 2021-09-22 DIAGNOSIS — Z98.890 OTHER SPECIFIED POSTPROCEDURAL STATES: Chronic | ICD-10-CM

## 2021-09-22 PROCEDURE — 99283 EMERGENCY DEPT VISIT LOW MDM: CPT

## 2021-09-22 PROCEDURE — 73562 X-RAY EXAM OF KNEE 3: CPT | Mod: 26,RT

## 2021-09-22 RX ORDER — ACETAMINOPHEN 500 MG
975 TABLET ORAL ONCE
Refills: 0 | Status: COMPLETED | OUTPATIENT
Start: 2021-09-22 | End: 2021-09-22

## 2021-09-22 RX ADMIN — Medication 975 MILLIGRAM(S): at 21:07

## 2021-09-22 NOTE — ED PROVIDER NOTE - CLINICAL SUMMARY MEDICAL DECISION MAKING FREE TEXT BOX
This is a 66 yr old M, pmh B cell lymphoma on chemo treatments, hld, OLEGARIO, knee replacement with s/p fall with multiple abrasions to right knee and right 5th finger and right knee pain. Denies loc, sob, fever, chills, headache, dizziness, n/v, no deformities, no tingling, no numbness.  Xray - r/o fractures, dislocation, pain managment

## 2021-09-22 NOTE — ED PROVIDER NOTE - OBJECTIVE STATEMENT
This is a 66 yr old M, pmh B cell lymphoma on chemo treatments, hld, OLEGARIO, knee replacement with s/p fall with multiple abrasions to right knee and right 5th finger and right knee pain. Denies loc, sob, fever, chills, headache, dizziness, n/v, no deformities, no tingling, no numbness.

## 2021-09-22 NOTE — ED ADULT NURSE NOTE - OBJECTIVE STATEMENT
Patient arrives to intake after tripping and falling in the bathroom while training to run away from a bee earlier today. A&OX4. Patient reports the bee bit him on the right wrist. Patient reports he was scared and tripped and fell on his right knee. Abrasion noted to right knee and right hand. Patient denies any pain to the hand. Right knee noted to be slightly swollen. Patient reports he is able to ambulate but it hurts. Hx of bilateral knee replacement and patient is currently receiving chemo for lymphoma- last chemo Monday. Wife at bedside. Patient medicated as ordered. Awaiting xray. Safety maintained. Patient stable upon exiting the room.

## 2021-09-22 NOTE — ED PROVIDER NOTE - PROGRESS NOTE DETAILS
NP Javan- xray - wnl no dislocation, no fracture, wound cleaned with ns bacitracin applied, walking assistant device provided. Pt walks with device steady. Pain management with good effect.

## 2021-09-22 NOTE — ED PROVIDER NOTE - NSFOLLOWUPINSTRUCTIONS_ED_ALL_ED_FT
The following are a list of orthopedic clinics in the surrounding area    [facility]  Orthopedic Surgery  [address] [place] [id number]  Phone: [phone]  Fax:   Follow Up Time:     [facility]  Orthopedics  [address] [place] [address]  Phone: [phone]  Fax: [phone]  Follow Up Time:     [facility]  Orthopedics  [address] [place] [address]  Knee Pain    Knee pain is a very common symptom and can have many causes. Knee pain often goes away when you follow your health care provider's instructions for relieving pain and discomfort at home. However, knee pain can develop into a condition that needs treatment. Some conditions may include:     Arthritis caused by wear and tear (osteoarthritis).  Arthritis caused by swelling and irritation (rheumatoid arthritis or gout).  A cyst or growth in your knee.  An infection in your knee joint.  An injury that will not heal.  Damage, swelling, or irritation of the tissues that support your knee (torn ligaments or tendinitis).    If your knee pain continues, additional tests may be ordered to diagnose your condition. Tests may include X-rays or other imaging studies of your knee. You may also need to have fluid removed from your knee. Treatment for ongoing knee pain depends on the cause, but treatment may include:    Medicines to relieve pain or swelling.  Steroid injections in your knee.  Physical therapy.  Surgery.    HOME CARE INSTRUCTIONS  Take medicines only as directed by your health care provider.   Rest your knee and keep it raised (elevated) while you are resting.  Do not do things that cause or worsen pain.  Avoid high-impact activities or exercises, such as running, jumping rope, or doing jumping jacks.  Apply ice to the knee area:  Put ice in a plastic bag.  Place a towel between your skin and the bag.  Leave the ice on for 20 minutes, 2–3 times a day.  Ask your health care provider if you should wear an elastic knee support.  Keep a pillow under your knee when you sleep.  Lose weight if you are overweight. Extra weight can put pressure on your knee.  Do not use any tobacco products, including cigarettes, chewing tobacco, or electronic cigarettes. If you need help quitting, ask your health care provider. Smoking may slow the healing of any bone and joint problems that you may have.    SEEK MEDICAL CARE IF:  Your knee pain continues, changes, or gets worse.  You have a fever along with knee pain.  Your knee troy or locks up.  Your knee becomes more swollen.    SEEK IMMEDIATE MEDICAL CARE IF:  Your knee joint feels hot to the touch.  You have chest pain or trouble breathing.

## 2021-09-22 NOTE — ED PROVIDER NOTE - PATIENT PORTAL LINK FT
You can access the FollowMyHealth Patient Portal offered by [facility] by registering at the following website: [email or web address]. By joining [facility]’s FollowMyHealth portal, you will also be able to view your health information using other applications (apps) compatible with our system.

## 2021-09-22 NOTE — ED ADULT TRIAGE NOTE - CHIEF COMPLAINT QUOTE
p/t with hx Lymphoma on chemo, c/o of trip and fall in the bathroom, p/t was running away from Bee, bee bite to rt wrist as well,

## 2021-09-22 NOTE — ED ADULT TRIAGE NOTE - PATIENT ON (OXYGEN DELIVERY METHOD)
1  Continue current medications  2  Get lipid panel and CMP done today before going home  May use [facility] laboratory here in [place]  3  Cardiology follow-up approximately six months with EKG  room air

## 2021-10-02 ENCOUNTER — RESULT REVIEW (OUTPATIENT)
Age: 66
End: 2021-10-02

## 2021-10-02 ENCOUNTER — APPOINTMENT (OUTPATIENT)
Dept: NUCLEAR MEDICINE | Facility: IMAGING CENTER | Age: 66
End: 2021-10-02
Payer: MEDICARE

## 2021-10-02 ENCOUNTER — OUTPATIENT (OUTPATIENT)
Dept: OUTPATIENT SERVICES | Facility: HOSPITAL | Age: 66
LOS: 1 days | End: 2021-10-02
Payer: COMMERCIAL

## 2021-10-02 DIAGNOSIS — C85.10 UNSPECIFIED B-CELL LYMPHOMA, UNSPECIFIED SITE: ICD-10-CM

## 2021-10-02 DIAGNOSIS — Z98.890 OTHER SPECIFIED POSTPROCEDURAL STATES: Chronic | ICD-10-CM

## 2021-10-02 PROCEDURE — 78815 PET IMAGE W/CT SKULL-THIGH: CPT | Mod: 26,PS

## 2021-10-02 PROCEDURE — 78815 PET IMAGE W/CT SKULL-THIGH: CPT

## 2021-10-02 PROCEDURE — A9552: CPT

## 2021-10-07 ENCOUNTER — APPOINTMENT (OUTPATIENT)
Dept: HEMATOLOGY ONCOLOGY | Facility: CLINIC | Age: 66
End: 2021-10-07
Payer: MEDICARE

## 2021-10-07 ENCOUNTER — NON-APPOINTMENT (OUTPATIENT)
Age: 66
End: 2021-10-07

## 2021-10-07 ENCOUNTER — OUTPATIENT (OUTPATIENT)
Dept: OUTPATIENT SERVICES | Facility: HOSPITAL | Age: 66
LOS: 1 days | Discharge: HOME | End: 2021-10-07

## 2021-10-07 VITALS
SYSTOLIC BLOOD PRESSURE: 127 MMHG | OXYGEN SATURATION: 98 % | RESPIRATION RATE: 16 BRPM | WEIGHT: 240 LBS | DIASTOLIC BLOOD PRESSURE: 80 MMHG | HEART RATE: 75 BPM | BODY MASS INDEX: 32.55 KG/M2 | TEMPERATURE: 98.5 F

## 2021-10-07 DIAGNOSIS — Z98.890 OTHER SPECIFIED POSTPROCEDURAL STATES: Chronic | ICD-10-CM

## 2021-10-07 DIAGNOSIS — C83.10 MANTLE CELL LYMPHOMA, UNSPECIFIED SITE: ICD-10-CM

## 2021-10-07 PROCEDURE — 85025 COMPLETE CBC W/AUTO DIFF WBC: CPT | Mod: GC

## 2021-10-07 PROCEDURE — 99214 OFFICE O/P EST MOD 30 MIN: CPT | Mod: GC

## 2021-10-08 LAB
ALBUMIN SERPL ELPH-MCNC: 4.5 G/DL
ALP BLD-CCNC: 112 U/L
ALT SERPL-CCNC: 22 U/L
ANION GAP SERPL CALC-SCNC: 16 MMOL/L
AST SERPL-CCNC: 19 U/L
BILIRUB SERPL-MCNC: 0.2 MG/DL
BUN SERPL-MCNC: 17 MG/DL
CALCIUM SERPL-MCNC: 9.5 MG/DL
CHLORIDE SERPL-SCNC: 104 MMOL/L
CO2 SERPL-SCNC: 22 MMOL/L
CREAT SERPL-MCNC: 1.2 MG/DL
GLUCOSE SERPL-MCNC: 103 MG/DL
LDH SERPL-CCNC: 249 U/L
MAGNESIUM SERPL-MCNC: 1.9 MG/DL
PHOSPHATE SERPL-MCNC: 2.8 MG/DL
POTASSIUM SERPL-SCNC: 4 MMOL/L
PROT SERPL-MCNC: 7.3 G/DL
SODIUM SERPL-SCNC: 142 MMOL/L
URATE SERPL-MCNC: 7.2 MG/DL

## 2021-10-11 ENCOUNTER — RESULT REVIEW (OUTPATIENT)
Age: 66
End: 2021-10-11

## 2021-10-11 ENCOUNTER — APPOINTMENT (OUTPATIENT)
Dept: INFUSION THERAPY | Facility: HOSPITAL | Age: 66
End: 2021-10-11

## 2021-10-11 DIAGNOSIS — Z51.11 ENCOUNTER FOR ANTINEOPLASTIC CHEMOTHERAPY: ICD-10-CM

## 2021-10-11 DIAGNOSIS — R11.2 NAUSEA WITH VOMITING, UNSPECIFIED: ICD-10-CM

## 2021-10-11 LAB
BASOPHILS # BLD AUTO: 0.1 K/UL — SIGNIFICANT CHANGE UP (ref 0–0.2)
BASOPHILS NFR BLD AUTO: 1.2 % — SIGNIFICANT CHANGE UP (ref 0–2)
EOSINOPHIL # BLD AUTO: 0.01 K/UL — SIGNIFICANT CHANGE UP (ref 0–0.5)
EOSINOPHIL NFR BLD AUTO: 0.1 % — SIGNIFICANT CHANGE UP (ref 0–6)
HCT VFR BLD CALC: 38.3 % — LOW (ref 39–50)
HGB BLD-MCNC: 13.2 G/DL — SIGNIFICANT CHANGE UP (ref 13–17)
IMM GRANULOCYTES NFR BLD AUTO: 2.8 % — HIGH (ref 0–1.5)
LYMPHOCYTES # BLD AUTO: 0.84 K/UL — LOW (ref 1–3.3)
LYMPHOCYTES # BLD AUTO: 10.1 % — LOW (ref 13–44)
MCHC RBC-ENTMCNC: 30.5 PG — SIGNIFICANT CHANGE UP (ref 27–34)
MCHC RBC-ENTMCNC: 34.5 G/DL — SIGNIFICANT CHANGE UP (ref 32–36)
MCV RBC AUTO: 88.5 FL — SIGNIFICANT CHANGE UP (ref 80–100)
MONOCYTES # BLD AUTO: 0.86 K/UL — SIGNIFICANT CHANGE UP (ref 0–0.9)
MONOCYTES NFR BLD AUTO: 10.4 % — SIGNIFICANT CHANGE UP (ref 2–14)
NEUTROPHILS # BLD AUTO: 6.25 K/UL — SIGNIFICANT CHANGE UP (ref 1.8–7.4)
NEUTROPHILS NFR BLD AUTO: 75.4 % — SIGNIFICANT CHANGE UP (ref 43–77)
NRBC # BLD: 0 /100 WBCS — SIGNIFICANT CHANGE UP (ref 0–0)
PLATELET # BLD AUTO: 309 K/UL — SIGNIFICANT CHANGE UP (ref 150–400)
RBC # BLD: 4.33 M/UL — SIGNIFICANT CHANGE UP (ref 4.2–5.8)
RBC # FLD: 15.1 % — HIGH (ref 10.3–14.5)
WBC # BLD: 8.29 K/UL — SIGNIFICANT CHANGE UP (ref 3.8–10.5)
WBC # FLD AUTO: 8.29 K/UL — SIGNIFICANT CHANGE UP (ref 3.8–10.5)

## 2021-10-11 NOTE — HISTORY OF PRESENT ILLNESS
[de-identified] : Mr. Gregory Mercado is a 66 year old man with PMHx of Us esophagus, history of two knee replacements, hip replacements, neck surgery, arm surgery, and lower back surgery, He takes baclofen, nabumetone, naproxen, and sometimes Tylenol extra strength for pain. He also take omeprazole. Patient presents today for initial consultation for recent diagnosis of DLBCL, non-germinal center subtype. Patient had a sonoguided core biopsy of right lymph node of neck on 7/28/21 with pathology resulting in \par CD5, CD10 negative lymphoma, favor Diffuse large B-cell lymphoma, non-Germinal center subtype.  \par \par Patient reports he has enlarged right neck mass that began growing 1 month ago. He report this to his PCP, who proceeded the patient to have sono guide core biopsy. Patient reports his voice is hoarse and he at times has difficulty swallowing. He denies choking, difficulty breathing. Denies fever/chills, No drenching night sweats. No CP/SOB. No lower extremity edema. Appetite is okay. Weight stable. No abd pain. Has occasional loose stools. No hematuria, dysuria. No recent/recurrent infections. Energy levels stable.\par He is a retired  and is now a full time . [de-identified] : 10/7/2021- Pt is now s/p cycle 2 of R-CHOP and tolerated treatment on 9/20/21 followed by PET/CT on 10/2/2021- shows response to therapy\par He has no new fevers, chills or night sweats. He has no difficulty swallowing. Pt denies constipation or nausea after chemo. He has a hx of carpal tunnel (?) currently wearing a brace on his right wrist, is c/o of b/l hand tingling- which he has had before starting therapy as well and this has no worsened. No mouth sores. No abd pain. No recent/recurrent infections. Reports he has occasional jaw pain-which started before chemo -we advised to follow up with dentist, as it could be related to TMJ- he hasn't gone to dentist, because jaw pain has resolved. Otherwise patient feels very well.

## 2021-10-11 NOTE — PHYSICAL EXAM
[Fully active, able to carry on all pre-disease performance without restriction] : Status 0 - Fully active, able to carry on all pre-disease performance without restriction [Normal] : affect appropriate [de-identified] : right cervical LN s/p exicional biopsy - healing scar-cervical enlarged lymph nodes has signifcantly decreased in size since C1 of chemo [de-identified] : right wrist possible ganglion cyst

## 2021-10-11 NOTE — ASSESSMENT
[FreeTextEntry1] : 66 year old male presenting for initial consultation for recent diagnosis of DLBCL, non-germinal center subtype. Patient had a sonoguided core biopsy of right lymph node of neck on 7/28/21 with pathology resulting in \par CD5, CD10 negative lymphoma, favor Diffuse large B-cell lymphoma, non-Germinal center subtype. Given the pathology results, he will go for an excisional biopsy next week. We discussed that we can start prednisone after the biopsy which will improve his symptoms. PET/CT demonstrated the known R cervical node (SUV 30s) and possibly a 0.9 cm perigastric LN (SUV 5).  PT under went excisional biopsy of right neck LN on 8/18. Path was consistent with DLBCL non-germinal center subtype.\par \par #DLBCL non-germinal center subtype\par - Reviewed path results with patient and family\par - Would recommend R-CHOP for 6 cycles given mild FDG avidity of perigastric LN. Went over side effects of chemo regimen including reactivation of Hep B, infusion site reactions, lower of blood counts, infections, neuropathy, n/v, diarrhea/constipation, hair loss.\par Consent signed today, pt's son and daughter provided translation in [place]. Pt was given chemo information printed in [place] - he is agreeable to start therapy\par -Pt is s/p Cycle 1 R-CHOP on 8/30/21 and Fulphila given at home via nursing care.\par -s/p C2  on 9/20/21\par -C3 due on 10/11/2021, ok to treat\par \par -PET/CT 10/2/2021:shows response to current regimen:\par 1.Interval resolution of FDG avidity associated with cervical and perigastric lymph nodes which have either resolved or significantly decreased in size.\par \par 2. New mild diffuse splenic and generalized bone marrow hypermetabolism without CT correlate, probably secondary to treatment with G-CSF.\par \par 3. Nonspecific new FDG avidity corresponding to subtle tree-in-bud opacity in the right upper lobe, probably inflammatory. Suggest follow-up chest CT to assess for resolution.\par \par -will repeat CT scans in 6 weeks\par \par - c/w First Mouth wash for oral discomfort and Simethicone for lower abdominal discomfort. \par \par #Hx Carpal Tunnel syndrome\par -suspect joint pain related to this and not to lymphoma/chemotherapy \par -will follow up with hand surgery \par \par Follow up 3 weeks after C3, sooner PRN\par

## 2021-10-28 ENCOUNTER — APPOINTMENT (OUTPATIENT)
Dept: HEMATOLOGY ONCOLOGY | Facility: CLINIC | Age: 66
End: 2021-10-28
Payer: MEDICARE

## 2021-10-28 VITALS
BODY MASS INDEX: 32.55 KG/M2 | DIASTOLIC BLOOD PRESSURE: 74 MMHG | OXYGEN SATURATION: 99 % | SYSTOLIC BLOOD PRESSURE: 140 MMHG | HEART RATE: 80 BPM | WEIGHT: 240 LBS

## 2021-10-28 PROCEDURE — 99214 OFFICE O/P EST MOD 30 MIN: CPT | Mod: GC

## 2021-10-28 PROCEDURE — 85025 COMPLETE CBC W/AUTO DIFF WBC: CPT | Mod: GC

## 2021-10-29 LAB
ALBUMIN SERPL ELPH-MCNC: 4.4 G/DL
ALP BLD-CCNC: 96 U/L
ALT SERPL-CCNC: 19 U/L
ANION GAP SERPL CALC-SCNC: 14 MMOL/L
AST SERPL-CCNC: 16 U/L
BILIRUB SERPL-MCNC: 0.3 MG/DL
BUN SERPL-MCNC: 12 MG/DL
CALCIUM SERPL-MCNC: 9.4 MG/DL
CHLORIDE SERPL-SCNC: 105 MMOL/L
CO2 SERPL-SCNC: 24 MMOL/L
CREAT SERPL-MCNC: 1.11 MG/DL
GLUCOSE SERPL-MCNC: 123 MG/DL
LDH SERPL-CCNC: 190 U/L
MAGNESIUM SERPL-MCNC: 2 MG/DL
PHOSPHATE SERPL-MCNC: 2.7 MG/DL
POTASSIUM SERPL-SCNC: 4.5 MMOL/L
PROT SERPL-MCNC: 7.3 G/DL
SODIUM SERPL-SCNC: 143 MMOL/L
URATE SERPL-MCNC: 6.6 MG/DL

## 2021-10-29 NOTE — RESULTS/DATA
[FreeTextEntry1] : CBC done 10/28/21\par wbc- 7.0\par hgb- 13.9\par hct - 41.8\par plt- 314\par alc- 1.26\par anc- 5.03\par \par \par \par \par Final Diagnosis\par 1.  Lymph node, right neck, sono-guided core biopsy:\par - CD5, CD10 negative lymphoma, favor Diffuse large B-cell\par lymphoma, non-Germinal center subtype.\par \par See note and description.\par \par Diagnostic note:  The current right neck lymph node is\par morphologically and immunophenotypically consistent with a CD5,\par CD10 negative lymphoma, favor diffuse large B-cell lymphoma, non-\par germinal center subtype. Additional studies, including molecular\par and cytogenetics/FISH are performed and will be reported in a\par comprehensive addendum.\par \par Dr. Orozco was urgently faxed report on 8/2/21.\par \par Microscopic description:   The histologic sections of the right\par neck lymph node biopsy shows fragmented lymphoid tissue with\par areas of increased atypical larger cells. The larger cells show\par increased nuclear to cytoplasmic ratio, irregular nuclear\par contours and some with prominent nucleoli. No necrosis is present\par and mitoses/apoptosis are frequent.\par \par Immunohistochemical stains for CD3, CD5, CD20, PAX5, CD10, BCL6,\par BCL2, CyclinD1, Ki67, CD23, CD21, CD43, CD79a, MUM1 stains\par performed on formalin fixed paraffin embedded tissue, block 1A.\par \par Neoplastic cells are:  the larger atypical cells\par Positive:  CD20, PAX-5, BCL-6, MUM1, BCL2, p53 and some scattered\par CD30 positive cells, KI-67% is high within the areas of the\par larger cells (70-80%), c-MYC\par Negative: CD3, CD5, CD10, CD23 and CD21 (in the areas of the\par larger cells), ROSE\par Other:  CD3 and CD5 are positive in T-cells\par \par Flow cytometry: Monotypic B-cells (33% of cells), positive for\par kappa, CD19, CD20, FMC-7; negative CD5, CD10, CD23. The findings\par are consistent with CD5 negative, CD10 negative B-cell\par lymphoproliferative disorder/lymphoma.\par \par Cytogenetics/FISH: pending\par \par \par \par \par \par \par BAO UREÑA                 3\par \par \par \par Surgical Final Report\par \par \par \par \par Molecular studies: pending\par \par Verified by: Jennifer Gorman MD\par (Electronic Signature)\par Reported on: 08/02/21 12:38 EDT, [address] [facility],\[place] [address]\par Phone: [phone]   Fax: [phone]\par _________________________________________________________________\par \par PET/CT 8/12/21\par IMPRESSION: Abnormal FDG-PET/CT scan.\par \par 1. FDG-avid right cervical lymphadenopathy corresponds to biopsy-proven lymphoma.\par \par 2. Small FDG-avid perigastric lymph node is indeterminate, possibly lymphoma.\par \par --- End of Report ---\par

## 2021-10-29 NOTE — PHYSICAL EXAM
[Fully active, able to carry on all pre-disease performance without restriction] : Status 0 - Fully active, able to carry on all pre-disease performance without restriction [Normal] : affect appropriate [de-identified] : right cervical LN s/p exicional biopsy - healing scar-cervical enlarged lymph nodes has signifcantly decreased in size since C1 of chemo [de-identified] : right wrist possible ganglion cyst

## 2021-10-29 NOTE — REVIEW OF SYSTEMS
[Patient Intake Form Reviewed] : Patient intake form was reviewed [Joint Pain] : joint pain [Muscle Pain] : muscle pain [Swollen Glands] : swollen glands [Negative] : Allergic/Immunologic [FreeTextEntry7] : occasional abdominal discomfort [de-identified] : occasional numbness in arms; has b/l hand tingling [de-identified] : cervical enlarged lymph nodes has signifcantly decreased in size since C1 of chemo

## 2021-10-29 NOTE — END OF VISIT
[FreeTextEntry3] : Patient seen and case discussed with LAURA Sprague. I agree with above and have edited the note where needed.\par

## 2021-10-29 NOTE — HISTORY OF PRESENT ILLNESS
[de-identified] : Mr. Gregory Mercado is a 66 year old man with PMHx of Us esophagus, history of two knee replacements, hip replacements, neck surgery, arm surgery, and lower back surgery, He takes baclofen, nabumetone, naproxen, and sometimes Tylenol extra strength for pain. He also take omeprazole. Patient presents today for initial consultation for recent diagnosis of DLBCL, non-germinal center subtype. Patient had a sonoguided core biopsy of right lymph node of neck on 7/28/21 with pathology resulting in \par CD5, CD10 negative lymphoma, favor Diffuse large B-cell lymphoma, non-Germinal center subtype.  \par \par Patient reports he has enlarged right neck mass that began growing 1 month ago. He report this to his PCP, who proceeded the patient to have sono guide core biopsy. Patient reports his voice is hoarse and he at times has difficulty swallowing. He denies choking, difficulty breathing. Denies fever/chills, No drenching night sweats. No CP/SOB. No lower extremity edema. Appetite is okay. Weight stable. No abd pain. Has occasional loose stools. No hematuria, dysuria. No recent/recurrent infections. Energy levels stable.\par He is a retired  and is now a full time . [de-identified] : 10/28/21- Pt is now s/p cycle 3 of R-CHOP and tolerated treatment on 10/11/21.  PET/CT on 10/2/2021- shows response to therapy\par He has no new fevers, chills or night sweats. He has no difficulty swallowing. Pt states he has fatigue and nausea after chemo. He has a hx of carpal tunnel (?) currently denies numbness/tingling of hands or feet. No mouth sores. . No recent/recurrent infections. Reports he has occasional jaw pain-which started before chemo -we advised to follow up with dentist, as it could be related to TMJ- he hasn't gone to dentist, because jaw pain has resolved. Otherwise patient feels very well.

## 2021-10-29 NOTE — ASSESSMENT
[FreeTextEntry1] : 66 year old male presenting for initial consultation for recent diagnosis of DLBCL, non-germinal center subtype. Patient had a sonoguided core biopsy of right lymph node of neck on 7/28/21 with pathology resulting in \par CD5, CD10 negative lymphoma, favor Diffuse large B-cell lymphoma, non-Germinal center subtype. Given the pathology results, he will go for an excisional biopsy next week. We discussed that we can start prednisone after the biopsy which will improve his symptoms. PET/CT demonstrated the known R cervical node (SUV 30s) and possibly a 0.9 cm perigastric LN (SUV 5).  PT under went excisional biopsy of right neck LN on 8/18. Path was consistent with DLBCL non-germinal center subtype.\par \par #DLBCL non-germinal center subtype\par - Reviewed path results with patient and family\par - Would recommend R-CHOP for 6 cycles given mild FDG avidity of perigastric LN.\par -Pt is s/p Cycle 1 R-CHOP on 8/30/21 and Fulphila given at home via nursing care.\par -s/p C2  on 9/20/21, C3 on 10/11/21\par -C4 due on 11/012021\par \par -PET/CT 10/2/2021:shows response to current regimen:\par 1.Interval resolution of FDG avidity associated with cervical and perigastric lymph nodes which have either resolved or significantly decreased in size.\par \par 2. New mild diffuse splenic and generalized bone marrow hypermetabolism without CT correlate, probably secondary to treatment with G-CSF.\par \par 3. Nonspecific new FDG avidity corresponding to subtle tree-in-bud opacity in the right upper lobe, probably inflammatory. Suggest follow-up chest CT to assess for resolution.\par \par -will repeat CT scans in the next 1-2 weeks\par - c/w First Mouth wash for oral discomfort and Simethicone for lower abdominal discomfort. \par - advised pt to take anti-nausea meds at first sign of nausea. Increase prilosec to bid dosing.\par \par #Hx Carpal Tunnel syndrome\par -suspect joint pain related to this and not to lymphoma/chemotherapy \par -will follow up with hand surgery \par \par Follow up 3 weeks after C4, sooner PRN\par

## 2021-11-01 ENCOUNTER — APPOINTMENT (OUTPATIENT)
Dept: INFUSION THERAPY | Facility: HOSPITAL | Age: 66
End: 2021-11-01

## 2021-11-01 ENCOUNTER — RESULT REVIEW (OUTPATIENT)
Age: 66
End: 2021-11-01

## 2021-11-01 LAB
BASOPHILS # BLD AUTO: 0.08 K/UL — SIGNIFICANT CHANGE UP (ref 0–0.2)
BASOPHILS NFR BLD AUTO: 0.9 % — SIGNIFICANT CHANGE UP (ref 0–2)
EOSINOPHIL # BLD AUTO: 0.03 K/UL — SIGNIFICANT CHANGE UP (ref 0–0.5)
EOSINOPHIL NFR BLD AUTO: 0.3 % — SIGNIFICANT CHANGE UP (ref 0–6)
HCT VFR BLD CALC: 38.4 % — LOW (ref 39–50)
HGB BLD-MCNC: 13.2 G/DL — SIGNIFICANT CHANGE UP (ref 13–17)
IMM GRANULOCYTES NFR BLD AUTO: 0.9 % — SIGNIFICANT CHANGE UP (ref 0–1.5)
LYMPHOCYTES # BLD AUTO: 0.75 K/UL — LOW (ref 1–3.3)
LYMPHOCYTES # BLD AUTO: 8.2 % — LOW (ref 13–44)
MCHC RBC-ENTMCNC: 30.5 PG — SIGNIFICANT CHANGE UP (ref 27–34)
MCHC RBC-ENTMCNC: 34.4 G/DL — SIGNIFICANT CHANGE UP (ref 32–36)
MCV RBC AUTO: 88.7 FL — SIGNIFICANT CHANGE UP (ref 80–100)
MONOCYTES # BLD AUTO: 0.89 K/UL — SIGNIFICANT CHANGE UP (ref 0–0.9)
MONOCYTES NFR BLD AUTO: 9.8 % — SIGNIFICANT CHANGE UP (ref 2–14)
NEUTROPHILS # BLD AUTO: 7.28 K/UL — SIGNIFICANT CHANGE UP (ref 1.8–7.4)
NEUTROPHILS NFR BLD AUTO: 79.9 % — HIGH (ref 43–77)
NRBC # BLD: 0 /100 WBCS — SIGNIFICANT CHANGE UP (ref 0–0)
PLATELET # BLD AUTO: 358 K/UL — SIGNIFICANT CHANGE UP (ref 150–400)
RBC # BLD: 4.33 M/UL — SIGNIFICANT CHANGE UP (ref 4.2–5.8)
RBC # FLD: 15 % — HIGH (ref 10.3–14.5)
WBC # BLD: 9.11 K/UL — SIGNIFICANT CHANGE UP (ref 3.8–10.5)
WBC # FLD AUTO: 9.11 K/UL — SIGNIFICANT CHANGE UP (ref 3.8–10.5)

## 2021-11-02 ENCOUNTER — NON-APPOINTMENT (OUTPATIENT)
Age: 66
End: 2021-11-02

## 2021-11-03 ENCOUNTER — APPOINTMENT (OUTPATIENT)
Dept: INFUSION THERAPY | Facility: HOSPITAL | Age: 66
End: 2021-11-03

## 2021-11-15 ENCOUNTER — APPOINTMENT (OUTPATIENT)
Dept: CT IMAGING | Facility: IMAGING CENTER | Age: 66
End: 2021-11-15
Payer: MEDICARE

## 2021-11-15 ENCOUNTER — OUTPATIENT (OUTPATIENT)
Dept: OUTPATIENT SERVICES | Facility: HOSPITAL | Age: 66
LOS: 1 days | End: 2021-11-15
Payer: COMMERCIAL

## 2021-11-15 DIAGNOSIS — C85.10 UNSPECIFIED B-CELL LYMPHOMA, UNSPECIFIED SITE: ICD-10-CM

## 2021-11-15 DIAGNOSIS — Z98.890 OTHER SPECIFIED POSTPROCEDURAL STATES: Chronic | ICD-10-CM

## 2021-11-15 PROCEDURE — 71260 CT THORAX DX C+: CPT | Mod: 26

## 2021-11-15 PROCEDURE — 74177 CT ABD & PELVIS W/CONTRAST: CPT

## 2021-11-15 PROCEDURE — 71260 CT THORAX DX C+: CPT

## 2021-11-15 PROCEDURE — 74177 CT ABD & PELVIS W/CONTRAST: CPT | Mod: 26

## 2021-11-15 PROCEDURE — 82565 ASSAY OF CREATININE: CPT

## 2021-11-15 PROCEDURE — 70491 CT SOFT TISSUE NECK W/DYE: CPT | Mod: 26

## 2021-11-15 PROCEDURE — 70491 CT SOFT TISSUE NECK W/DYE: CPT

## 2021-11-17 ENCOUNTER — OUTPATIENT (OUTPATIENT)
Dept: OUTPATIENT SERVICES | Facility: HOSPITAL | Age: 66
LOS: 1 days | Discharge: HOME | End: 2021-11-17

## 2021-11-17 DIAGNOSIS — Z98.890 OTHER SPECIFIED POSTPROCEDURAL STATES: Chronic | ICD-10-CM

## 2021-11-17 DIAGNOSIS — C83.10 MANTLE CELL LYMPHOMA, UNSPECIFIED SITE: ICD-10-CM

## 2021-11-20 ENCOUNTER — LABORATORY RESULT (OUTPATIENT)
Age: 66
End: 2021-11-20

## 2021-11-22 ENCOUNTER — APPOINTMENT (OUTPATIENT)
Dept: INFUSION THERAPY | Facility: HOSPITAL | Age: 66
End: 2021-11-22

## 2021-11-22 ENCOUNTER — RESULT REVIEW (OUTPATIENT)
Age: 66
End: 2021-11-22

## 2021-11-22 ENCOUNTER — NON-APPOINTMENT (OUTPATIENT)
Age: 66
End: 2021-11-22

## 2021-11-22 LAB
ALBUMIN SERPL ELPH-MCNC: 4.2 G/DL
ALP BLD-CCNC: 87 U/L
ALT SERPL-CCNC: 22 U/L
ANION GAP SERPL CALC-SCNC: 14 MMOL/L
AST SERPL-CCNC: 22 U/L
BASOPHILS # BLD AUTO: 0 K/UL
BASOPHILS # BLD AUTO: 0 K/UL — SIGNIFICANT CHANGE UP (ref 0–0.2)
BASOPHILS NFR BLD AUTO: 0 %
BASOPHILS NFR BLD AUTO: 0 % — SIGNIFICANT CHANGE UP (ref 0–2)
BILIRUB SERPL-MCNC: 0.4 MG/DL
BUN SERPL-MCNC: 13 MG/DL
CALCIUM SERPL-MCNC: 9.2 MG/DL
CHLORIDE SERPL-SCNC: 103 MMOL/L
CO2 SERPL-SCNC: 22 MMOL/L
CREAT SERPL-MCNC: 1.13 MG/DL
EOSINOPHIL # BLD AUTO: 0 K/UL — SIGNIFICANT CHANGE UP (ref 0–0.5)
EOSINOPHIL # BLD AUTO: 0.16 K/UL
EOSINOPHIL NFR BLD AUTO: 0 % — SIGNIFICANT CHANGE UP (ref 0–6)
EOSINOPHIL NFR BLD AUTO: 1.7 %
GLUCOSE SERPL-MCNC: 135 MG/DL
HCT VFR BLD CALC: 38.1 % — LOW (ref 39–50)
HCT VFR BLD CALC: 41.1 %
HGB BLD-MCNC: 12.9 G/DL — LOW (ref 13–17)
HGB BLD-MCNC: 13.3 G/DL
LDH SERPL-CCNC: 299 U/L
LYMPHOCYTES # BLD AUTO: 0.34 K/UL — LOW (ref 1–3.3)
LYMPHOCYTES # BLD AUTO: 0.41 K/UL
LYMPHOCYTES # BLD AUTO: 3 % — LOW (ref 13–44)
LYMPHOCYTES NFR BLD AUTO: 4.4 %
MAGNESIUM SERPL-MCNC: 1.9 MG/DL
MAN DIFF?: NORMAL
MCHC RBC-ENTMCNC: 30.3 PG
MCHC RBC-ENTMCNC: 30.4 PG — SIGNIFICANT CHANGE UP (ref 27–34)
MCHC RBC-ENTMCNC: 32.4 GM/DL
MCHC RBC-ENTMCNC: 33.9 G/DL — SIGNIFICANT CHANGE UP (ref 32–36)
MCV RBC AUTO: 89.9 FL — SIGNIFICANT CHANGE UP (ref 80–100)
MCV RBC AUTO: 93.6 FL
MONOCYTES # BLD AUTO: 0.57 K/UL
MONOCYTES # BLD AUTO: 1.03 K/UL — HIGH (ref 0–0.9)
MONOCYTES NFR BLD AUTO: 6.1 %
MONOCYTES NFR BLD AUTO: 9 % — SIGNIFICANT CHANGE UP (ref 2–14)
NEUTROPHILS # BLD AUTO: 10.03 K/UL — HIGH (ref 1.8–7.4)
NEUTROPHILS # BLD AUTO: 8.08 K/UL
NEUTROPHILS NFR BLD AUTO: 86 %
NEUTROPHILS NFR BLD AUTO: 88 % — HIGH (ref 43–77)
NRBC # BLD: 0 /100 — SIGNIFICANT CHANGE UP (ref 0–0)
NRBC # BLD: SIGNIFICANT CHANGE UP /100 WBCS (ref 0–0)
PHOSPHATE SERPL-MCNC: 3.1 MG/DL
PLAT MORPH BLD: NORMAL — SIGNIFICANT CHANGE UP
PLATELET # BLD AUTO: 299 K/UL — SIGNIFICANT CHANGE UP (ref 150–400)
PLATELET # BLD AUTO: 303 K/UL
POTASSIUM SERPL-SCNC: 4 MMOL/L
PROT SERPL-MCNC: 6.8 G/DL
RBC # BLD: 4.24 M/UL — SIGNIFICANT CHANGE UP (ref 4.2–5.8)
RBC # BLD: 4.39 M/UL
RBC # FLD: 15.6 % — HIGH (ref 10.3–14.5)
RBC # FLD: 16 %
RBC BLD AUTO: SIGNIFICANT CHANGE UP
SODIUM SERPL-SCNC: 140 MMOL/L
URATE SERPL-MCNC: 6.5 MG/DL
WBC # BLD: 11.4 K/UL — HIGH (ref 3.8–10.5)
WBC # FLD AUTO: 11.4 K/UL — HIGH (ref 3.8–10.5)
WBC # FLD AUTO: 9.4 K/UL

## 2021-11-23 ENCOUNTER — NON-APPOINTMENT (OUTPATIENT)
Age: 66
End: 2021-11-23

## 2021-11-23 DIAGNOSIS — Z51.11 ENCOUNTER FOR ANTINEOPLASTIC CHEMOTHERAPY: ICD-10-CM

## 2021-11-23 DIAGNOSIS — R11.2 NAUSEA WITH VOMITING, UNSPECIFIED: ICD-10-CM

## 2021-11-29 ENCOUNTER — LABORATORY RESULT (OUTPATIENT)
Age: 66
End: 2021-11-29

## 2021-11-29 ENCOUNTER — APPOINTMENT (OUTPATIENT)
Dept: HEMATOLOGY ONCOLOGY | Facility: CLINIC | Age: 66
End: 2021-11-29
Payer: MEDICARE

## 2021-11-29 VITALS
WEIGHT: 245 LBS | SYSTOLIC BLOOD PRESSURE: 140 MMHG | BODY MASS INDEX: 33.23 KG/M2 | DIASTOLIC BLOOD PRESSURE: 70 MMHG | OXYGEN SATURATION: 99 % | HEART RATE: 89 BPM

## 2021-11-29 LAB
ALBUMIN SERPL ELPH-MCNC: 4.1 G/DL
ALP BLD-CCNC: 91 U/L
ALT SERPL-CCNC: 33 U/L
ANION GAP SERPL CALC-SCNC: 12 MMOL/L
AST SERPL-CCNC: 21 U/L
BILIRUB SERPL-MCNC: 0.4 MG/DL
BUN SERPL-MCNC: 19 MG/DL
CALCIUM SERPL-MCNC: 8.9 MG/DL
CHLORIDE SERPL-SCNC: 104 MMOL/L
CO2 SERPL-SCNC: 24 MMOL/L
CREAT SERPL-MCNC: 0.99 MG/DL
GLUCOSE SERPL-MCNC: 121 MG/DL
LDH SERPL-CCNC: 321 U/L
MAGNESIUM SERPL-MCNC: 1.9 MG/DL
PHOSPHATE SERPL-MCNC: 2.9 MG/DL
POTASSIUM SERPL-SCNC: 3.9 MMOL/L
PROT SERPL-MCNC: 6.4 G/DL
SODIUM SERPL-SCNC: 140 MMOL/L
URATE SERPL-MCNC: 5.4 MG/DL

## 2021-11-29 PROCEDURE — 99214 OFFICE O/P EST MOD 30 MIN: CPT | Mod: GC

## 2021-11-29 PROCEDURE — 85025 COMPLETE CBC W/AUTO DIFF WBC: CPT | Mod: GC

## 2021-11-29 NOTE — PHYSICAL EXAM
[Fully active, able to carry on all pre-disease performance without restriction] : Status 0 - Fully active, able to carry on all pre-disease performance without restriction [Normal] : affect appropriate [de-identified] : right cervical LN s/p exicional biopsy - healing scar-cervical enlarged lymph nodes has signifcantly decreased in size since C1 of chemo [de-identified] : right wrist possible ganglion cyst

## 2021-11-29 NOTE — RESULTS/DATA
[FreeTextEntry1] : CBC done 11/29/21\par - will send out to core lab\par \par \par \par \par Final Diagnosis\par 1.  Lymph node, right neck, sono-guided core biopsy:\par - CD5, CD10 negative lymphoma, favor Diffuse large B-cell\par lymphoma, non-Germinal center subtype.\par \par See note and description.\par \par Diagnostic note:  The current right neck lymph node is\par morphologically and immunophenotypically consistent with a CD5,\par CD10 negative lymphoma, favor diffuse large B-cell lymphoma, non-\par germinal center subtype. Additional studies, including molecular\par and cytogenetics/FISH are performed and will be reported in a\par comprehensive addendum.\par \par Dr. Orozco was urgently faxed report on 8/2/21.\par \par Microscopic description:   The histologic sections of the right\par neck lymph node biopsy shows fragmented lymphoid tissue with\par areas of increased atypical larger cells. The larger cells show\par increased nuclear to cytoplasmic ratio, irregular nuclear\par contours and some with prominent nucleoli. No necrosis is present\par and mitoses/apoptosis are frequent.\par \par Immunohistochemical stains for CD3, CD5, CD20, PAX5, CD10, BCL6,\par BCL2, CyclinD1, Ki67, CD23, CD21, CD43, CD79a, MUM1 stains\par performed on formalin fixed paraffin embedded tissue, block 1A.\par \par Neoplastic cells are:  the larger atypical cells\par Positive:  CD20, PAX-5, BCL-6, MUM1, BCL2, p53 and some scattered\par CD30 positive cells, KI-67% is high within the areas of the\par larger cells (70-80%), c-MYC\par Negative: CD3, CD5, CD10, CD23 and CD21 (in the areas of the\par larger cells), ROSE\par Other:  CD3 and CD5 are positive in T-cells\par \par Flow cytometry: Monotypic B-cells (33% of cells), positive for\par kappa, CD19, CD20, FMC-7; negative CD5, CD10, CD23. The findings\par are consistent with CD5 negative, CD10 negative B-cell\par lymphoproliferative disorder/lymphoma.\par \par Cytogenetics/FISH: pending\par \par \par \par \par \par \par [place] BAO GARCIA                 3\par \par \par \par Surgical Final Report\par \par \par \par \par Molecular studies: pending\par \par Verified by: Jennifer Gorman MD\par (Electronic Signature)\par Reported on: 08/02/21 12:38 EDT, [address],\[place] [address]\par Phone: [phone]   Fax: [phone]\par _________________________________________________________________\par \par PET/CT 8/12/21\par IMPRESSION: Abnormal FDG-PET/CT scan.\par \par 1. FDG-avid right cervical lymphadenopathy corresponds to biopsy-proven lymphoma.\par \par 2. Small FDG-avid perigastric lymph node is indeterminate, possibly lymphoma.\par \par --- End of Report ---\par

## 2021-11-29 NOTE — REVIEW OF SYSTEMS
[Patient Intake Form Reviewed] : Patient intake form was reviewed [Joint Pain] : joint pain [Muscle Pain] : muscle pain [Swollen Glands] : swollen glands [Negative] : Allergic/Immunologic [FreeTextEntry7] : occasional abdominal discomfort [de-identified] : occasional numbness in arms; has b/l hand tingling [de-identified] : cervical enlarged lymph nodes has signifcantly decreased in size since C1 of chemo

## 2021-11-29 NOTE — ASSESSMENT
[FreeTextEntry1] : 66 year old male presenting for initial consultation for recent diagnosis of DLBCL, non-germinal center subtype. Patient had a sonoguided core biopsy of right lymph node of neck on 7/28/21 with pathology resulting in \par CD5, CD10 negative lymphoma, favor Diffuse large B-cell lymphoma, non-Germinal center subtype. Given the pathology results, he will go for an excisional biopsy next week. We discussed that we can start prednisone after the biopsy which will improve his symptoms. PET/CT demonstrated the known R cervical node (SUV 30s) and possibly a 0.9 cm perigastric LN (SUV 5).  PT under went excisional biopsy of right neck LN on 8/18. Path was consistent with DLBCL non-germinal center subtype.\par \par #DLBCL non-germinal center subtype\par - Reviewed path results with patient and family\par - Would recommend R-CHOP for 6 cycles given mild FDG avidity of perigastric LN.\par -Pt is s/p Cycle 1 R-CHOP on 8/30/21 and Fulphila given at home via nursing care.\par -s/p C2  on 9/20/21, C3 on 10/11/21, C4 on 11/1/21\par -C6 due on 12/13/2021\par \par -PET/CT 10/2/2021:shows response to current regimen:\par 1.Interval resolution of FDG avidity associated with cervical and perigastric lymph nodes which have either resolved or significantly decreased in size.\par \par 2. New mild diffuse splenic and generalized bone marrow hypermetabolism without CT correlate, probably secondary to treatment with G-CSF.\par \par 3. Nonspecific new FDG avidity corresponding to subtle tree-in-bud opacity in the right upper lobe, probably inflammatory. Suggest follow-up chest CT to assess for resolution.\par \par \par - Repeat CT scan done 11/19/21  which show Posttreatment changes to multiple right cervical nodes. No pathologically enlarged lymph nodes currently. Small scattered bilateral ground glass opacities, which may be infectious or inflammatory in etiology. No suspicious lymphadenopathy\par -Will have pt go for EOT PET 4 weeks after cycle 6\par - c/w First Mouth wash for oral discomfort and Simethicone for lower abdominal discomfort. \par - advised pt to take anti-nausea meds at first sign of nausea. Increase prilosec to bid dosing.\par \par #Hx Carpal Tunnel syndrome\par -suspect joint pain related to this and not to lymphoma/chemotherapy \par -will follow up with hand surgery \par \par Follow up 12/29/21 prior to cycle 6\par

## 2021-11-29 NOTE — HISTORY OF PRESENT ILLNESS
[Treatment Protocol] : Treatment Protocol [de-identified] : Mr. Gregory Mercado is a 66 year old man with PMHx of Us esophagus, history of two knee replacements, hip replacements, neck surgery, arm surgery, and lower back surgery, He takes baclofen, nabumetone, naproxen, and sometimes Tylenol extra strength for pain. He also take omeprazole. Patient presents today for initial consultation for recent diagnosis of DLBCL, non-germinal center subtype. Patient had a sonoguided core biopsy of right lymph node of neck on 7/28/21 with pathology resulting in \par CD5, CD10 negative lymphoma, favor Diffuse large B-cell lymphoma, non-Germinal center subtype.  \par \par Patient reports he has enlarged right neck mass that began growing 1 month ago. He report this to his PCP, who proceeded the patient to have sono guide core biopsy. Patient reports his voice is hoarse and he at times has difficulty swallowing. He denies choking, difficulty breathing. Denies fever/chills, No drenching night sweats. No CP/SOB. No lower extremity edema. Appetite is okay. Weight stable. No abd pain. Has occasional loose stools. No hematuria, dysuria. No recent/recurrent infections. Energy levels stable.\par He is a retired  and is now a full time . [FreeTextEntry1] : R-\par R-CHOP: 8/30/21- present  [de-identified] : 11/29/21- Pt is now s/p cycle 5 of R-CHOP and tolerated treatment on 11/22/21.  PET/CT on 10/2/2021- shows response to therapy. Pt had CT scans on neck/abd/pelvis done 11/19/21 which show Posttreatment changes to multiple right cervical nodes. No pathologically enlarged lymph nodes currently. Small scattered bilateral groundglass opacities, which may be infectious or inflammatory in etiology. No suspicious lymphadenopathy\par He has no new fevers, chills or night sweats. He has no difficulty swallowing. Pt continues to have bloating, denies constipation. Pt states he has fatigue after chemo. He has a hx of carpal tunnel (?) currently denies numbness/tingling of hands or feet. No mouth sores. . No recent/recurrent infections. Reports he has occasional jaw pain-which started before chemo -we advised to follow up with dentist, as it could be related to TMJ- he hasn't gone to dentist, because jaw pain has resolved. Otherwise patient feels very well.

## 2021-12-06 ENCOUNTER — INPATIENT (INPATIENT)
Facility: HOSPITAL | Age: 66
LOS: 4 days | Discharge: HOME HEALTH | End: 2021-12-11
Attending: INTERNAL MEDICINE | Admitting: INTERNAL MEDICINE
Payer: MEDICARE

## 2021-12-06 VITALS
SYSTOLIC BLOOD PRESSURE: 156 MMHG | HEIGHT: 78 IN | RESPIRATION RATE: 16 BRPM | OXYGEN SATURATION: 98 % | DIASTOLIC BLOOD PRESSURE: 82 MMHG | TEMPERATURE: 97 F | HEART RATE: 94 BPM

## 2021-12-06 DIAGNOSIS — J18.9 PNEUMONIA, UNSPECIFIED ORGANISM: ICD-10-CM

## 2021-12-06 DIAGNOSIS — D72.825 BANDEMIA: ICD-10-CM

## 2021-12-06 DIAGNOSIS — G47.33 OBSTRUCTIVE SLEEP APNEA (ADULT) (PEDIATRIC): ICD-10-CM

## 2021-12-06 DIAGNOSIS — R79.89 OTHER SPECIFIED ABNORMAL FINDINGS OF BLOOD CHEMISTRY: ICD-10-CM

## 2021-12-06 DIAGNOSIS — Z98.890 OTHER SPECIFIED POSTPROCEDURAL STATES: Chronic | ICD-10-CM

## 2021-12-06 DIAGNOSIS — R06.00 DYSPNEA, UNSPECIFIED: ICD-10-CM

## 2021-12-06 DIAGNOSIS — R07.9 CHEST PAIN, UNSPECIFIED: ICD-10-CM

## 2021-12-06 DIAGNOSIS — Z29.9 ENCOUNTER FOR PROPHYLACTIC MEASURES, UNSPECIFIED: ICD-10-CM

## 2021-12-06 LAB
ALBUMIN SERPL ELPH-MCNC: 4.2 G/DL — SIGNIFICANT CHANGE UP (ref 3.3–5)
ALP SERPL-CCNC: 77 U/L — SIGNIFICANT CHANGE UP (ref 40–120)
ALT FLD-CCNC: 20 U/L — SIGNIFICANT CHANGE UP (ref 4–41)
ANION GAP SERPL CALC-SCNC: 12 MMOL/L — SIGNIFICANT CHANGE UP (ref 7–14)
APTT BLD: 31.3 SEC — SIGNIFICANT CHANGE UP (ref 27–36.3)
AST SERPL-CCNC: 23 U/L — SIGNIFICANT CHANGE UP (ref 4–40)
B PERT DNA SPEC QL NAA+PROBE: SIGNIFICANT CHANGE UP
B PERT+PARAPERT DNA PNL SPEC NAA+PROBE: SIGNIFICANT CHANGE UP
BASOPHILS # BLD AUTO: 0 K/UL — SIGNIFICANT CHANGE UP (ref 0–0.2)
BASOPHILS NFR BLD AUTO: 0 % — SIGNIFICANT CHANGE UP (ref 0–2)
BILIRUB SERPL-MCNC: 0.4 MG/DL — SIGNIFICANT CHANGE UP (ref 0.2–1.2)
BLOOD GAS VENOUS COMPREHENSIVE RESULT: SIGNIFICANT CHANGE UP
BORDETELLA PARAPERTUSSIS (RAPRVP): SIGNIFICANT CHANGE UP
BUN SERPL-MCNC: 11 MG/DL — SIGNIFICANT CHANGE UP (ref 7–23)
C PNEUM DNA SPEC QL NAA+PROBE: SIGNIFICANT CHANGE UP
CALCIUM SERPL-MCNC: 9.3 MG/DL — SIGNIFICANT CHANGE UP (ref 8.4–10.5)
CHLORIDE SERPL-SCNC: 103 MMOL/L — SIGNIFICANT CHANGE UP (ref 98–107)
CO2 SERPL-SCNC: 23 MMOL/L — SIGNIFICANT CHANGE UP (ref 22–31)
CREAT SERPL-MCNC: 1.19 MG/DL — SIGNIFICANT CHANGE UP (ref 0.5–1.3)
D DIMER BLD IA.RAPID-MCNC: 321 NG/ML DDU — HIGH
EOSINOPHIL # BLD AUTO: 0 K/UL — SIGNIFICANT CHANGE UP (ref 0–0.5)
EOSINOPHIL NFR BLD AUTO: 0 % — SIGNIFICANT CHANGE UP (ref 0–6)
FLUAV SUBTYP SPEC NAA+PROBE: SIGNIFICANT CHANGE UP
FLUBV RNA SPEC QL NAA+PROBE: SIGNIFICANT CHANGE UP
GLUCOSE SERPL-MCNC: 130 MG/DL — HIGH (ref 70–99)
HADV DNA SPEC QL NAA+PROBE: SIGNIFICANT CHANGE UP
HCOV 229E RNA SPEC QL NAA+PROBE: SIGNIFICANT CHANGE UP
HCOV HKU1 RNA SPEC QL NAA+PROBE: SIGNIFICANT CHANGE UP
HCOV NL63 RNA SPEC QL NAA+PROBE: SIGNIFICANT CHANGE UP
HCOV OC43 RNA SPEC QL NAA+PROBE: SIGNIFICANT CHANGE UP
HCT VFR BLD CALC: 36.4 % — LOW (ref 39–50)
HGB BLD-MCNC: 12.5 G/DL — LOW (ref 13–17)
HMPV RNA SPEC QL NAA+PROBE: SIGNIFICANT CHANGE UP
HPIV1 RNA SPEC QL NAA+PROBE: SIGNIFICANT CHANGE UP
HPIV2 RNA SPEC QL NAA+PROBE: SIGNIFICANT CHANGE UP
HPIV3 RNA SPEC QL NAA+PROBE: SIGNIFICANT CHANGE UP
HPIV4 RNA SPEC QL NAA+PROBE: SIGNIFICANT CHANGE UP
IANC: 4.96 K/UL — SIGNIFICANT CHANGE UP (ref 1.5–8.5)
INR BLD: 1.22 RATIO — HIGH (ref 0.88–1.16)
LYMPHOCYTES # BLD AUTO: 0.3 K/UL — LOW (ref 1–3.3)
LYMPHOCYTES # BLD AUTO: 4 % — LOW (ref 13–44)
M PNEUMO DNA SPEC QL NAA+PROBE: SIGNIFICANT CHANGE UP
MCHC RBC-ENTMCNC: 30.3 PG — SIGNIFICANT CHANGE UP (ref 27–34)
MCHC RBC-ENTMCNC: 34.3 GM/DL — SIGNIFICANT CHANGE UP (ref 32–36)
MCV RBC AUTO: 88.1 FL — SIGNIFICANT CHANGE UP (ref 80–100)
MONOCYTES # BLD AUTO: 0.67 K/UL — SIGNIFICANT CHANGE UP (ref 0–0.9)
MONOCYTES NFR BLD AUTO: 9 % — SIGNIFICANT CHANGE UP (ref 2–14)
NEUTROPHILS # BLD AUTO: 6.29 K/UL — SIGNIFICANT CHANGE UP (ref 1.8–7.4)
NEUTROPHILS NFR BLD AUTO: 68 % — SIGNIFICANT CHANGE UP (ref 43–77)
PLATELET # BLD AUTO: 305 K/UL — SIGNIFICANT CHANGE UP (ref 150–400)
POTASSIUM SERPL-MCNC: 3.8 MMOL/L — SIGNIFICANT CHANGE UP (ref 3.5–5.3)
POTASSIUM SERPL-SCNC: 3.8 MMOL/L — SIGNIFICANT CHANGE UP (ref 3.5–5.3)
PROT SERPL-MCNC: 7.7 G/DL — SIGNIFICANT CHANGE UP (ref 6–8.3)
PROTHROM AB SERPL-ACNC: 13.9 SEC — HIGH (ref 10.6–13.6)
RAPID RVP RESULT: SIGNIFICANT CHANGE UP
RBC # BLD: 4.13 M/UL — LOW (ref 4.2–5.8)
RBC # FLD: 14.8 % — HIGH (ref 10.3–14.5)
RSV RNA SPEC QL NAA+PROBE: SIGNIFICANT CHANGE UP
RV+EV RNA SPEC QL NAA+PROBE: SIGNIFICANT CHANGE UP
SARS-COV-2 RNA SPEC QL NAA+PROBE: SIGNIFICANT CHANGE UP
SODIUM SERPL-SCNC: 138 MMOL/L — SIGNIFICANT CHANGE UP (ref 135–145)
TROPONIN T, HIGH SENSITIVITY RESULT: 33 NG/L — SIGNIFICANT CHANGE UP
WBC # BLD: 7.49 K/UL — SIGNIFICANT CHANGE UP (ref 3.8–10.5)
WBC # FLD AUTO: 7.49 K/UL — SIGNIFICANT CHANGE UP (ref 3.8–10.5)

## 2021-12-06 PROCEDURE — 71275 CT ANGIOGRAPHY CHEST: CPT | Mod: 26,MA

## 2021-12-06 PROCEDURE — 93010 ELECTROCARDIOGRAM REPORT: CPT

## 2021-12-06 PROCEDURE — 71046 X-RAY EXAM CHEST 2 VIEWS: CPT | Mod: 26

## 2021-12-06 PROCEDURE — 99285 EMERGENCY DEPT VISIT HI MDM: CPT | Mod: 25

## 2021-12-06 PROCEDURE — 99223 1ST HOSP IP/OBS HIGH 75: CPT

## 2021-12-06 RX ORDER — DIPHENHYDRAMINE HCL 50 MG
50 CAPSULE ORAL ONCE
Refills: 0 | Status: COMPLETED | OUTPATIENT
Start: 2021-12-06 | End: 2021-12-06

## 2021-12-06 RX ORDER — PIPERACILLIN AND TAZOBACTAM 4; .5 G/20ML; G/20ML
3.38 INJECTION, POWDER, LYOPHILIZED, FOR SOLUTION INTRAVENOUS ONCE
Refills: 0 | Status: COMPLETED | OUTPATIENT
Start: 2021-12-06 | End: 2021-12-06

## 2021-12-06 RX ORDER — VANCOMYCIN HCL 1 G
1000 VIAL (EA) INTRAVENOUS ONCE
Refills: 0 | Status: COMPLETED | OUTPATIENT
Start: 2021-12-06 | End: 2021-12-06

## 2021-12-06 RX ORDER — SODIUM CHLORIDE 9 MG/ML
3 INJECTION INTRAMUSCULAR; INTRAVENOUS; SUBCUTANEOUS EVERY 8 HOURS
Refills: 0 | Status: DISCONTINUED | OUTPATIENT
Start: 2021-12-06 | End: 2021-12-11

## 2021-12-06 RX ORDER — AZITHROMYCIN 500 MG/1
500 TABLET, FILM COATED ORAL ONCE
Refills: 0 | Status: COMPLETED | OUTPATIENT
Start: 2021-12-06 | End: 2021-12-06

## 2021-12-06 RX ADMIN — Medication 250 MILLIGRAM(S): at 22:48

## 2021-12-06 RX ADMIN — AZITHROMYCIN 255 MILLIGRAM(S): 500 TABLET, FILM COATED ORAL at 21:28

## 2021-12-06 RX ADMIN — Medication 50 MILLIGRAM(S): at 17:19

## 2021-12-06 RX ADMIN — PIPERACILLIN AND TAZOBACTAM 200 GRAM(S): 4; .5 INJECTION, POWDER, LYOPHILIZED, FOR SOLUTION INTRAVENOUS at 20:47

## 2021-12-06 RX ADMIN — Medication 40 MILLIGRAM(S): at 12:33

## 2021-12-06 RX ADMIN — SODIUM CHLORIDE 3 MILLILITER(S): 9 INJECTION INTRAMUSCULAR; INTRAVENOUS; SUBCUTANEOUS at 22:46

## 2021-12-06 NOTE — ED ADULT NURSE NOTE - CHIEF COMPLAINT QUOTE
Pt. c/o chest pain, cough and sob x 2-3wks. h/o lymphoma and advised to come to ER to r/o PE. Denies fevers.    Son-in-law (Koko) [phone]

## 2021-12-06 NOTE — ED PROVIDER NOTE - PROGRESS NOTE DETAILS
AKHIL Chambers: Spoke with Radiology resident about premedication protocol for patient with IV contrast allergy. will premedicate with 40mg solumedrol IV 5 hours  prior to scan and then 50mg benadryl IV 1 hour prior to CT PE study AKHIL Chambers: Spoke with Dr. Mayen regarding 16% bands. She said that this is likely secondrary to his Nulasta shot that he received 2 weeks ago and if the WBC is normal she is not concerned, and no antbx are required.  will send blood cx. DD ED ATTG:  Rec'd s/o from Dr Mason:  66M h/o lymphoma f/u by onc.  Exertional CP and SOB, no stress test.  Sent in r/o PE.  Dimer 321, in for CTA needs premedication.  CT at 5pm.  If negative CDU for stress.  16% bands - likely r/t chemo/neulasta.  CT shows possible pna.  Given bandemia and symptoms c/f pna.  Would admit, rx IV abx.

## 2021-12-06 NOTE — H&P ADULT - ATTENDING COMMENTS
Pt with GERD/Us's, NHL currently on R-CHOP p/w dyspnea and CP.  Pt reports CP occurs on deep inspiration.  No fever, + chills.  Some cough.    On exam: NAD, Heart RRR, breathing comfortably, lungs CTA B.  Abd benign.   Labs reviewed showing bandemia without leukocytosis  CTA chest refused showing diffuse GGO's     #PNA: possible  based on imaging.  Unclear if acute infection   Will continue empiric abx for now  consult pulmonology in am    #Bandemia.  Possibly 2/2 infection, or recent Pegfilgrastim  - continue to trend

## 2021-12-06 NOTE — H&P ADULT - PROBLEM SELECTOR PLAN 7
Patient with hemoglobin of 12.5. Baseline approximately 14.  Likely in setting of chronic disease and chemotherapy.  Continue to trend.

## 2021-12-06 NOTE — H&P ADULT - PROBLEM SELECTOR PLAN 4
Patient with Neutrophil bandemia of 16.  Patient has normal WBC count of 7.49.  Continue to trend.  Patient afebrile.  Bandemia possible in setting of chemotherapy agent.

## 2021-12-06 NOTE — H&P ADULT - HISTORY OF PRESENT ILLNESS
67 y/o F with PMH of Lymphoma on Nublasta (Last dose 2 weeks ago), OLEGARIO ( no CPAP for approx. 15 years), GERD, Osteoarthritis presents to the ED with complaint of chest pain and shortness of breath.  67 y/o F with PMH of Lymphoma on Nublasta (Last dose 2 weeks ago), OLEGARIO ( no CPAP for approx. 15 years), GERD, Osteoarthritis presents to the ED with complaint of chest pain and shortness of breath. Patient states that he has been experiencing chest pain for approximately 2 weeks. Patient states ta  65 y/o F with PMH of Lymphoma on Nublasta (Last dose 2 weeks ago), OLEGARIO ( no CPAP for approx. 15 years), GERD, Osteoarthritis presents to the ED with complaint of chest pain and shortness of breath. Patient states that he has been experiencing chest pain for approximately 2 weeks. Patient states that chest pain began  67 y/o F with PMH of Lymphoma on Nublasta (Last dose 2 weeks ago), OLEGARIO ( no CPAP for approx. 15 years), GERD, Osteoarthritis presents to the ED with complaint of chest pain and shortness of breath. Patient states that he has been experiencing chest pain for approximately 2 weeks. Patient states that chest pain occurs on exertion when walking. Patient states that he is only able to walk for 1-2 minutes before developing chest pain. Patient describes pain as "tightness", and denies radiation. Patient states that chest pain is associated with shortness of breath. Patient endorses resolution of chest pain when resting. Patient states that shortness of breath also occurs when taking deep breaths. Patient reports that he told his Oncologist this and he was sent to the ED to rule out a clot. Patient endorses non productive cough which he states he has for 5 days. Patient denies nasal congestion, fever and chills. Patient denies nausea, vomiting abdominal pain but does endorse some abdominal distention. Patient states last bowel movement was yesterday. Patient reports he is only Valacyclovir and Ondansetron at home. Patient denies leg pain.    In ED CTA was done to rule PE. CTA was negative however patient was noted to have Lung findings favored to represent organizing pneumonia, commonly seen in the setting of pulmonary drug toxicity. Infection is also in the differential. CXR showed no acute pulmonary disease. Patient's CBC showing bandemia of neutrophils at 16.0. WBC normal at 7.49.    67 y/o F with PMH of Lymphoma on Nublasta (Last dose 2 weeks ago), OLEGARIO ( no CPAP for approx. 15 years), GERD, Osteoarthritis presents to the ED with complaint of chest pain and shortness of breath. Patient states that he has been experiencing chest pain for approximately 2 weeks. Patient states that chest pain occurs on exertion when walking. Patient states that he is only able to walk for 1-2 minutes before developing chest pain. Patient describes pain as "tightness", and denies radiation. Patient states that chest pain is associated with shortness of breath. Patient endorses resolution of chest pain when resting. Patient states that shortness of breath also occurs when taking deep breaths. Patient reports that he told his Oncologist this and he was sent to the ED to rule out a clot. Patient endorses non productive cough which he states he has for 5 days. Patient denies nasal congestion, fever and chills. Patient denies nausea, vomiting abdominal pain but does endorse some abdominal distention. Patient states last bowel movement was yesterday. Patient reports he is only Valacyclovir and Ondansetron at home. Patient denies leg pain and recent vital illness.    In ED CTA was done to rule PE. CTA was negative however patient was noted to have Lung findings favored to represent organizing pneumonia, commonly seen in the setting of pulmonary drug toxicity. Infection is also in the differential. CXR showed no acute pulmonary disease. Patient's CBC showing bandemia of neutrophils at 16.0. WBC normal at 7.49.    67 y/o F with PMH of Lymphoma on R-CHOP (Last dose 2 weeks ago), OLEGARIO ( no CPAP for approx. 15 years), GERD, Osteoarthritis presents to the ED with complaint of chest pain and shortness of breath. Patient states that he has been experiencing chest pain for approximately 2 weeks. Patient states that chest pain occurs on exertion when walking. Patient states that he is only able to walk for 1-2 minutes before developing chest pain. Patient describes pain as "tightness", and denies radiation. Patient states that chest pain is associated with shortness of breath. Patient endorses resolution of chest pain when resting. Patient states that shortness of breath also occurs when taking deep breaths. Patient reports that he told his Oncologist this and he was sent to the ED to rule out a clot. Patient endorses non productive cough which he states he has for 5 days. Patient denies nasal congestion, fever and chills. Patient denies nausea, vomiting abdominal pain but does endorse some abdominal distention. Patient states last bowel movement was yesterday. Patient reports he is only Valacyclovir and Ondansetron at home. Patient denies leg pain and recent vital illness.    In ED CTA was done to rule PE. CTA was negative however patient was noted to have Lung findings favored to represent organizing pneumonia, commonly seen in the setting of pulmonary drug toxicity. Infection is also in the differential. CXR showed no acute pulmonary disease. Patient's CBC showing bandemia of neutrophils at 16.0. WBC normal at 7.49.

## 2021-12-06 NOTE — H&P ADULT - PROBLEM SELECTOR PLAN 3
Patient reporting dyspnea when taking deep breaths and on exertion.  CTA negative for PE.  Echo ordered, continue monitoring.  Patient not on CPAP. Blood gas pH and HCO3 within normal limits.

## 2021-12-06 NOTE — ED PROVIDER NOTE - CLINICAL SUMMARY MEDICAL DECISION MAKING FREE TEXT BOX
65 y/o M pmhx lymphoma on chemo therapy, last treatment 2.5 weeks ago c/o exertional SOB, cough and occasionally chest pain on inspiration for the last 2 weeks. -- denies fever, chills, leg swelling. he states that he has accasional cramping in his calves for the last month no active pain. -- will check labs, troponin, d-dimer, xray. and reeval

## 2021-12-06 NOTE — H&P ADULT - NSICDXFAMILYHX_GEN_ALL_CORE_FT
FAMILY HISTORY:  Sibling  Still living? No  FH: myocardial infarction, Age at diagnosis: Age Unknown  FH: stomach cancer, Age at diagnosis: Age Unknown

## 2021-12-06 NOTE — H&P ADULT - PROBLEM SELECTOR PLAN 1
Admit to medicine, continue monitoring.  CTA showing there is peripheral lower lobe predominant ill-defined groundglass densities in both lungs. Lung findings favored to represent organizing pneumonia, commonly seen in the setting of pulmonary drug toxicity. Infection is also in the differential.  Possibly in setting of Nulasta vs infectious etiology.  RVP and COVID 19 PCR negative.  Patient s/p Azithromycin 500 mg, Zosyn 3.375g and Vancomycin 1g in ED. Admit to medicine, continue monitoring.  CTA showing there is peripheral lower lobe predominant ill-defined groundglass densities in both lungs. Lung findings favored to represent organizing pneumonia, commonly seen in the setting of pulmonary drug toxicity. Infection is also in the differential.  Possibly in setting of Nulasta vs infectious etiology.  RVP and COVID 19 PCR negative.  Patient s/p Azithromycin 500 mg, Zosyn 3.375g and Vancomycin 1g in ED.  Will continue azithromycin and zosyn.  Consider Pulmonary consult in AM for "organizing pneumonia" Admit to medicine, continue monitoring.  CTA showing there is peripheral lower lobe predominant ill-defined groundglass densities in both lungs. Lung findings favored to represent organizing pneumonia, commonly seen in the setting of pulmonary drug toxicity. Infection is also in the differential.  Possibly in setting of Nulasta vs infectious etiology.  RVP and COVID 19 PCR negative.  Patient s/p Azithromycin 500 mg, Zosyn 3.375g and Vancomycin 1g in ED.  Will continue vancomycin and zosyn for now.  Call for Pulmonary consult in AM for "organizing pneumonia"

## 2021-12-06 NOTE — ED ADULT NURSE NOTE - OBJECTIVE STATEMENT
65 y/o male presents to ED with c/o cough.  Pt arrived in ED awake alert in NAD, speaking full sentences without difficulty.  Pt states that he has been having cough and SOB for the past 2-3 weeks.  Pt states that the SOB is worse with exertion and he sometimes has some pleuritic chest pain.  Pt also states that he gets the chills every evening around 5pm for the last 3 weeks.  Pt with hx of B cell lymphoma currently on chemo, last chemo 2.5 weeks ago, due for next and last chemo on 12/15.  Pt denies n/v/d, no fever, denies sick contacts.  Pt denies swelling of LE but states that he feels like his abd is more swollen.  Resps slightly tachypneic but no accessory muscle use noted.  IV established, labs drawn and sent, Pt placed on CM>NSR.  Provider lauryn oliveira

## 2021-12-06 NOTE — ED ADULT NURSE NOTE - IN THE PAST 12 MONTHS HAVE YOU USED DRUGS OTHER THAN THOSE REQUIRED FOR MEDICAL REASON?
----- Message from Jose Flanagan MD sent at 11/22/2019  2:27 PM CST -----  Please notify the patient of normal results.  Follow-up as planned.   No
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
## 2021-12-06 NOTE — ED PROVIDER NOTE - ATTENDING CONTRIBUTION TO CARE
I have personally performed a face to face medical and diagnostic evaluation of the patient. I have discussed with and reviewed the ACP's note and agree with the History, ROS, Physical Exam and MDM unless otherwise indicated. A brief summary of my personal evaluation and impression can be found below.    67yo M hx lymphoma on chemo p/w exertional chest pain and sob and occasionally with inspiration x 2 weeks. Was told by his oncologist to come to r/o PE. Otherwise no recent cardiac testing, leg pain/swelling, fever, chills or hx of similar episodesi n past.  VITALS: Initial triage and subsequent vitals have been reviewed by me.  Gen: Well appearing, NAD, alert, non-toxic  Head: NCAT  HEENT: MMM, normal conjunctiva, anicteric, neck supple  Lung: CTAB, no adventitious sounds  CV: RRR, no murmurs, 2+symmetric peripheral pulses  Abd: soft, NTND, no rebound or guarding, no palpable masses  MSK: No edema, no visible deformities  Neuro: Moving all extremity grossly, following commands appropriately, fluid speech  Skin: Warm and dry, no evidence of rash  Psych: normal mood and affect  Sx concerning for poss PE vs higher suspicion ACS given exertional sx will get ACS w/u and dimer to eval for PE. If PE w/u neg anticipate CDU for further provocative cardiac testing.

## 2021-12-06 NOTE — H&P ADULT - NSHPLABSRESULTS_GEN_ALL_CORE
Vital Signs Last 24 Hrs  T(C): 36.7 (06 Dec 2021 19:26), Max: 36.7 (06 Dec 2021 19:26)  T(F): 98 (06 Dec 2021 19:26), Max: 98 (06 Dec 2021 19:26)  HR: 70 (06 Dec 2021 19:26) (70 - 94)  BP: 127/80 (06 Dec 2021 19:26) (127/80 - 156/82)  BP(mean): --  RR: 19 (06 Dec 2021 19:26) (16 - 22)  SpO2: 100% (06 Dec 2021 19:26) (98% - 100%)                          12.5   7.49  )-----------( 305      ( 06 Dec 2021 11:35 )             36.4   12-06    138  |  103  |  11  ----------------------------<  130<H>  3.8   |  23  |  1.19    Ca    9.3      06 Dec 2021 11:35    TPro  7.7  /  Alb  4.2  /  TBili  0.4  /  DBili  x   /  AST  23  /  ALT  20  /  AlkPhos  77  12-06    PT/INR - ( 06 Dec 2021 11:35 )   PT: 13.9 sec;   INR: 1.22 ratio         PTT - ( 06 Dec 2021 11:35 )  PTT:31.3 sec    D-dimer: 321.    11:35 - VBG - pH: 7.38  | pCO2: 41    | pO2: 40    | Lactate: 1.9      RVP and COVID 19 PCR: Negative.    CT Angio chest with PE Protocol:  FINDINGS:    LUNGS AND AIRWAYS: Patent central airways.  There is peripheral lower lobe predominant ill-defined groundglass densities in both lungs.  PLEURA: No pleural effusion.  MEDIASTINUM AND VISHNU: No lymphadenopathy.  VESSELS: No pulmonary embolism. Right chest wall MediPort with tip in the SVC.  HEART: Heart size is normal. No pericardial effusion.  CHEST WALL AND LOWER NECK: Within normal limits.  VISUALIZED UPPER ABDOMEN: Indeterminate left hepatic hypoattenuation (2-103) measures 1.1 x 1.8 cm, previously 1.1 x 1.4 cm on 11/15/2021. Additional subcentimeter hypoattenuation adjacent to the falciform ligament is unchanged. These lesions did not demonstrate FDG avidity on prior PET scan from 10/2/2021.  BONES: Degenerative changes.    IMPRESSION:    1.  No pulmonary thromboembolism    2.  Lung findings favored to represent organizing pneumonia, commonly seen in the setting of pulmonary drug toxicity. Infection is also in the differential.    3.  Indeterminate left hepatic hypoattenuation that is increased in size from 11/15/2021, without evidence of FDG avidity on 10/2/2021. MR abdomen with IV contrast can be performed for further evaluation if clinically warranted.    4.  No thoracic lymphadenopathy    CXR:    FINDINGS:  A CT rated port is seen on the right with its tip in the SVC.  The cardiac and mediastinal silhouettes are within normal limits.  The lungs are clear.  There are no pleural effusions.  There are mild degenerative changes in the thoracic spine.    IMPRESSION: No acute pulmonary disease    EKG: Sinus rhythm at 88 bpm. QT/QTc 356/430.

## 2021-12-06 NOTE — H&P ADULT - PROBLEM SELECTOR PLAN 5
Patient with D-dimer of 321.  CTA negative for PE.  Patient denies pain in lower extremities.  Given history of Lymphoma.  Will order VA duplex of B/L lower extremities.

## 2021-12-06 NOTE — ED PROVIDER NOTE - OBJECTIVE STATEMENT
67 y/o M pmhx lymphoma on chemo therapy, last treatment 2.5 weeks ago c/o exertional SOB, cough and occasionally chest pain on inspiration for the last 2 weeks. he states that he spoke with his oncologist who told him to come to the ED to r/o clot in his lung. he denies leg swelling, abdominal pain, fever, chills, dizziness, blood in sputum or difficulty laying flat at night. he states that he feels tired when he walks up the stairs with worsening SOB. denies palpitations.

## 2021-12-06 NOTE — H&P ADULT - ASSESSMENT
65 y/o F with PMH of Lymphoma on Nublasta (Last dose 2 weeks ago), OLEGARIO ( no CPAP for approx. 15 years), GERD, Osteoarthritis presents to the ED with complaint of chest pain and shortness of breath. Patient admitted for organizing pneumonia. 67 y/o F with PMH of Lymphoma on R-CHOP(Last dose 2 weeks ago), OLEGARIO ( no CPAP for approx. 15 years), GERD, Osteoarthritis presents to the ED with complaint of chest pain and shortness of breath. Patient admitted for organizing pneumonia.

## 2021-12-06 NOTE — ED ADULT TRIAGE NOTE - CHIEF COMPLAINT QUOTE
Pt. c/o chest pain, cough and sob x 2-3wks. h/o lymphoma and advised to come to ER to r/o PE. Denies fevers.    Son-in-law (Koko) [phone]

## 2021-12-06 NOTE — H&P ADULT - PROBLEM SELECTOR PLAN 6
Patient follows with Dr. Mayen ([facility]).   ED provider reached out to Dr. Mayen regarding 16% band and was told that it was likely secondary to Nulasta shot that he received. since WBC was normal no concern.  Reach out to Dr. Mayen in AM.

## 2021-12-06 NOTE — H&P ADULT - PROBLEM SELECTOR PLAN 2
Patient reports chest pain with exertion.  Patient had cardiac cath in 2010 that showed 60% LAD stenosis.  Patient reports that he has not seen a Cardiologist in 5 years.  CTA negative for PE.  Troponin 33, repeat ordered.  Echo ordered.

## 2021-12-06 NOTE — ED ADULT NURSE NOTE - NSSUHOSCREENINGYN_ED_ALL_ED
Telephone Encounter by Chantal Solomon RN at 7/3/2021  1:32 PM     Author: Chantal Solomon RN Service: -- Author Type: Registered Nurse    Filed: 7/3/2021  1:32 PM Encounter Date: 7/3/2021 Status: Signed    : Chantal Solomon RN (Registered Nurse)       RN cannot approve Refill Request    RN can NOT refill this medication PCP messaged that patient is overdue for Office Visit. Last office visit: Visit date not found Last Physical: Visit date not found Last MTM visit: Visit date not found Last visit same specialty: 8/30/2018 Ros Nunez MD.  Next visit within 3 mo: Visit date not found  Next physical within 3 mo: Visit date not found      Chantal M Juanito, [facility] Triage/Med Refill 7/3/2021    Requested Prescriptions   Pending Prescriptions Disp Refills   ? traZODone (DESYREL) 50 MG tablet [Pharmacy Med Name: TRAZODONE 50 MG TABLET] 180 tablet 3     Sig: TAKE 2 TABLETS BY MOUTH AT BEDTIME AS NEEDED FOR INSOMNIA       Tricyclics/Misc Antidepressant/Antianxiety Meds Refill Protocol Failed - 7/3/2021  8:53 AM        Failed - PCP or prescribing provider visit in last year     Last office visit with prescriber/PCP: Visit date not found OR same dept: Visit date not found OR same specialty: 8/30/2018 Ros Nunez MD  Last physical: Visit date not found Last MTM visit: Visit date not found   Next visit within 3 mo: Visit date not found  Next physical within 3 mo: Visit date not found  Prescriber OR PCP: Barry Olea MD  Last diagnosis associated with med order: 1. Insomnia  - traZODone (DESYREL) 50 MG tablet [Pharmacy Med Name: TRAZODONE 50 MG TABLET]; TAKE 2 TABLETS BY MOUTH AT BEDTIME AS NEEDED FOR INSOMNIA  Dispense: 180 tablet; Refill: 3    2. Major depressive disorder, single episode, moderate (H)  - sertraline (ZOLOFT) 100 MG tablet [Pharmacy Med Name: SERTRALINE  MG TABLET]; TAKE 1 AND 1/2 TABLETS BY MOUTH ONCE DAILY  Dispense: 135 tablet; Refill: 3    If protocol passes  may refill for 12 months if within 3 months of last provider visit (or a total of 15 months).              ? sertraline (ZOLOFT) 100 MG tablet [Pharmacy Med Name: SERTRALINE  MG TABLET] 135 tablet 3     Sig: TAKE 1 AND 1/2 TABLETS BY MOUTH ONCE DAILY       [facility] Refill Protocol  Failed - 7/3/2021  8:53 AM        Failed - PCP or prescribing provider visit in last year     Last office visit with prescriber/PCP: Visit date not found OR same dept: Visit date not found OR same specialty: 8/30/2018 Ros Nunez MD  Last physical: Visit date not found Last MTM visit: Visit date not found   Next visit within 3 mo: Visit date not found  Next physical within 3 mo: Visit date not found  Prescriber OR PCP: Barry Olea MD  Last diagnosis associated with med order: 1. Insomnia  - traZODone (DESYREL) 50 MG tablet [Pharmacy Med Name: TRAZODONE 50 MG TABLET]; TAKE 2 TABLETS BY MOUTH AT BEDTIME AS NEEDED FOR INSOMNIA  Dispense: 180 tablet; Refill: 3    2. Major depressive disorder, single episode, moderate (H)  - sertraline (ZOLOFT) 100 MG tablet [Pharmacy Med Name: SERTRALINE  MG TABLET]; TAKE 1 AND 1/2 TABLETS BY MOUTH ONCE DAILY  Dispense: 135 tablet; Refill: 3    If protocol passes may refill for 12 months if within 3 months of last provider visit (or a total of 15 months).                         Yes - the patient is able to be screened

## 2021-12-07 DIAGNOSIS — D64.9 ANEMIA, UNSPECIFIED: ICD-10-CM

## 2021-12-07 DIAGNOSIS — C85.90 NON-HODGKIN LYMPHOMA, UNSPECIFIED, UNSPECIFIED SITE: ICD-10-CM

## 2021-12-07 LAB
A1C WITH ESTIMATED AVERAGE GLUCOSE RESULT: 5.6 % — SIGNIFICANT CHANGE UP (ref 4–5.6)
ANION GAP SERPL CALC-SCNC: 12 MMOL/L — SIGNIFICANT CHANGE UP (ref 7–14)
BASOPHILS # BLD AUTO: 0.04 K/UL — SIGNIFICANT CHANGE UP (ref 0–0.2)
BASOPHILS NFR BLD AUTO: 0.6 % — SIGNIFICANT CHANGE UP (ref 0–2)
BUN SERPL-MCNC: 14 MG/DL — SIGNIFICANT CHANGE UP (ref 7–23)
CALCIUM SERPL-MCNC: 9.7 MG/DL — SIGNIFICANT CHANGE UP (ref 8.4–10.5)
CHLORIDE SERPL-SCNC: 101 MMOL/L — SIGNIFICANT CHANGE UP (ref 98–107)
CHOLEST SERPL-MCNC: 184 MG/DL — SIGNIFICANT CHANGE UP
CK MB BLD-MCNC: 1.1 % — SIGNIFICANT CHANGE UP (ref 0–2.5)
CK MB CFR SERPL CALC: 1 NG/ML — SIGNIFICANT CHANGE UP
CK SERPL-CCNC: 91 U/L — SIGNIFICANT CHANGE UP (ref 30–200)
CO2 SERPL-SCNC: 23 MMOL/L — SIGNIFICANT CHANGE UP (ref 22–31)
CREAT SERPL-MCNC: 0.98 MG/DL — SIGNIFICANT CHANGE UP (ref 0.5–1.3)
EOSINOPHIL # BLD AUTO: 0 K/UL — SIGNIFICANT CHANGE UP (ref 0–0.5)
EOSINOPHIL NFR BLD AUTO: 0 % — SIGNIFICANT CHANGE UP (ref 0–6)
ESTIMATED AVERAGE GLUCOSE: 114 — SIGNIFICANT CHANGE UP
GLUCOSE SERPL-MCNC: 109 MG/DL — HIGH (ref 70–99)
HCT VFR BLD CALC: 36.4 % — LOW (ref 39–50)
HCV AB S/CO SERPL IA: 0.1 S/CO — SIGNIFICANT CHANGE UP (ref 0–0.99)
HCV AB SERPL-IMP: SIGNIFICANT CHANGE UP
HDLC SERPL-MCNC: 31 MG/DL — LOW
HGB BLD-MCNC: 11.8 G/DL — LOW (ref 13–17)
IANC: 5.19 K/UL — SIGNIFICANT CHANGE UP (ref 1.5–8.5)
IMM GRANULOCYTES NFR BLD AUTO: 7.4 % — HIGH (ref 0–1.5)
LIPID PNL WITH DIRECT LDL SERPL: 131 MG/DL — HIGH
LYMPHOCYTES # BLD AUTO: 0.29 K/UL — LOW (ref 1–3.3)
LYMPHOCYTES # BLD AUTO: 4.3 % — LOW (ref 13–44)
MAGNESIUM SERPL-MCNC: 2.1 MG/DL — SIGNIFICANT CHANGE UP (ref 1.6–2.6)
MCHC RBC-ENTMCNC: 29.6 PG — SIGNIFICANT CHANGE UP (ref 27–34)
MCHC RBC-ENTMCNC: 32.4 GM/DL — SIGNIFICANT CHANGE UP (ref 32–36)
MCV RBC AUTO: 91.5 FL — SIGNIFICANT CHANGE UP (ref 80–100)
MONOCYTES # BLD AUTO: 0.76 K/UL — SIGNIFICANT CHANGE UP (ref 0–0.9)
MONOCYTES NFR BLD AUTO: 11.2 % — SIGNIFICANT CHANGE UP (ref 2–14)
NEUTROPHILS # BLD AUTO: 5.19 K/UL — SIGNIFICANT CHANGE UP (ref 1.8–7.4)
NEUTROPHILS NFR BLD AUTO: 76.5 % — SIGNIFICANT CHANGE UP (ref 43–77)
NON HDL CHOLESTEROL: 153 MG/DL — HIGH
NRBC # BLD: 0 /100 WBCS — SIGNIFICANT CHANGE UP
NRBC # FLD: 0 K/UL — SIGNIFICANT CHANGE UP
PHOSPHATE SERPL-MCNC: 3.2 MG/DL — SIGNIFICANT CHANGE UP (ref 2.5–4.5)
PLATELET # BLD AUTO: 360 K/UL — SIGNIFICANT CHANGE UP (ref 150–400)
POTASSIUM SERPL-MCNC: 4.2 MMOL/L — SIGNIFICANT CHANGE UP (ref 3.5–5.3)
POTASSIUM SERPL-SCNC: 4.2 MMOL/L — SIGNIFICANT CHANGE UP (ref 3.5–5.3)
RBC # BLD: 3.98 M/UL — LOW (ref 4.2–5.8)
RBC # FLD: 14.6 % — HIGH (ref 10.3–14.5)
SODIUM SERPL-SCNC: 136 MMOL/L — SIGNIFICANT CHANGE UP (ref 135–145)
TRIGL SERPL-MCNC: 112 MG/DL — SIGNIFICANT CHANGE UP
TROPONIN T, HIGH SENSITIVITY RESULT: 20 NG/L — SIGNIFICANT CHANGE UP
TSH SERPL-MCNC: 0.4 UIU/ML — SIGNIFICANT CHANGE UP (ref 0.27–4.2)
WBC # BLD: 6.78 K/UL — SIGNIFICANT CHANGE UP (ref 3.8–10.5)
WBC # FLD AUTO: 6.78 K/UL — SIGNIFICANT CHANGE UP (ref 3.8–10.5)

## 2021-12-07 PROCEDURE — 99223 1ST HOSP IP/OBS HIGH 75: CPT | Mod: GC

## 2021-12-07 PROCEDURE — 99232 SBSQ HOSP IP/OBS MODERATE 35: CPT

## 2021-12-07 RX ORDER — PANTOPRAZOLE SODIUM 20 MG/1
40 TABLET, DELAYED RELEASE ORAL
Refills: 0 | Status: DISCONTINUED | OUTPATIENT
Start: 2021-12-07 | End: 2021-12-11

## 2021-12-07 RX ORDER — ACETAMINOPHEN 500 MG
650 TABLET ORAL EVERY 6 HOURS
Refills: 0 | Status: DISCONTINUED | OUTPATIENT
Start: 2021-12-07 | End: 2021-12-11

## 2021-12-07 RX ORDER — HEPARIN SODIUM 5000 [USP'U]/ML
5000 INJECTION INTRAVENOUS; SUBCUTANEOUS EVERY 12 HOURS
Refills: 0 | Status: DISCONTINUED | OUTPATIENT
Start: 2021-12-07 | End: 2021-12-11

## 2021-12-07 RX ORDER — PIPERACILLIN AND TAZOBACTAM 4; .5 G/20ML; G/20ML
3.38 INJECTION, POWDER, LYOPHILIZED, FOR SOLUTION INTRAVENOUS EVERY 8 HOURS
Refills: 0 | Status: DISCONTINUED | OUTPATIENT
Start: 2021-12-07 | End: 2021-12-11

## 2021-12-07 RX ORDER — VALACYCLOVIR 500 MG/1
500 TABLET, FILM COATED ORAL DAILY
Refills: 0 | Status: DISCONTINUED | OUTPATIENT
Start: 2021-12-08 | End: 2021-12-11

## 2021-12-07 RX ORDER — INFLUENZA VIRUS VACCINE 15; 15; 15; 15 UG/.5ML; UG/.5ML; UG/.5ML; UG/.5ML
0.7 SUSPENSION INTRAMUSCULAR ONCE
Refills: 0 | Status: DISCONTINUED | OUTPATIENT
Start: 2021-12-07 | End: 2021-12-11

## 2021-12-07 RX ORDER — VALACYCLOVIR 500 MG/1
TABLET, FILM COATED ORAL
Refills: 0 | Status: DISCONTINUED | OUTPATIENT
Start: 2021-12-07 | End: 2021-12-11

## 2021-12-07 RX ORDER — VALACYCLOVIR 500 MG/1
500 TABLET, FILM COATED ORAL ONCE
Refills: 0 | Status: COMPLETED | OUTPATIENT
Start: 2021-12-07 | End: 2021-12-07

## 2021-12-07 RX ORDER — VANCOMYCIN HCL 1 G
1000 VIAL (EA) INTRAVENOUS EVERY 24 HOURS
Refills: 0 | Status: DISCONTINUED | OUTPATIENT
Start: 2021-12-07 | End: 2021-12-10

## 2021-12-07 RX ADMIN — Medication 650 MILLIGRAM(S): at 13:32

## 2021-12-07 RX ADMIN — HEPARIN SODIUM 5000 UNIT(S): 5000 INJECTION INTRAVENOUS; SUBCUTANEOUS at 17:36

## 2021-12-07 RX ADMIN — SODIUM CHLORIDE 3 MILLILITER(S): 9 INJECTION INTRAMUSCULAR; INTRAVENOUS; SUBCUTANEOUS at 05:16

## 2021-12-07 RX ADMIN — Medication 650 MILLIGRAM(S): at 22:43

## 2021-12-07 RX ADMIN — HEPARIN SODIUM 5000 UNIT(S): 5000 INJECTION INTRAVENOUS; SUBCUTANEOUS at 05:16

## 2021-12-07 RX ADMIN — Medication 250 MILLIGRAM(S): at 20:06

## 2021-12-07 RX ADMIN — Medication 650 MILLIGRAM(S): at 23:45

## 2021-12-07 RX ADMIN — PIPERACILLIN AND TAZOBACTAM 25 GRAM(S): 4; .5 INJECTION, POWDER, LYOPHILIZED, FOR SOLUTION INTRAVENOUS at 04:04

## 2021-12-07 RX ADMIN — Medication 650 MILLIGRAM(S): at 12:54

## 2021-12-07 RX ADMIN — SODIUM CHLORIDE 3 MILLILITER(S): 9 INJECTION INTRAMUSCULAR; INTRAVENOUS; SUBCUTANEOUS at 14:42

## 2021-12-07 RX ADMIN — VALACYCLOVIR 500 MILLIGRAM(S): 500 TABLET, FILM COATED ORAL at 20:51

## 2021-12-07 RX ADMIN — SODIUM CHLORIDE 3 MILLILITER(S): 9 INJECTION INTRAMUSCULAR; INTRAVENOUS; SUBCUTANEOUS at 23:16

## 2021-12-07 RX ADMIN — PIPERACILLIN AND TAZOBACTAM 25 GRAM(S): 4; .5 INJECTION, POWDER, LYOPHILIZED, FOR SOLUTION INTRAVENOUS at 11:30

## 2021-12-07 RX ADMIN — PIPERACILLIN AND TAZOBACTAM 25 GRAM(S): 4; .5 INJECTION, POWDER, LYOPHILIZED, FOR SOLUTION INTRAVENOUS at 21:22

## 2021-12-07 NOTE — CONSULT NOTE ADULT - ASSESSMENT
65 yo M PMH B-cell lymphoma on R-CHOP (last dose ~2 weeks ago), OLEGARIO not on CPAP, GERD, and OA presented to ED w/ worsening chest pain found to have bilateral GGOs on CT scan    - CT reviewed, per radiology concern for possible underlying organizing pneumonia, though less likely given recent steroid use w/ chemo  - given immunosuppression agree w/ empiric abx for now, RVP negative; would send off sputum culture and urine legionella  - consider addition of azithro, send off MRSA nares and can likely DC vanc if negative  - ddx includes possible drug induced pneumonitis  - will defer steroids for now pending infectious workup  - pulmonary to follow    Herbert Cordero MD  PGY-5  PCCM Fellow  Pager [phone]
65 y/o male with PMH of Lymphoma on R-CHOP (Last dose 2 weeks ago), OLEGARIO ( no CPAP for approx. 15 years), GERD, Osteoarthritis presents to the ED with complaint of chest pain and shortness of breath. CT chest angio showing findings concerning for organizing pneumonia, no PE. Hematology was consulted for history of lymphoma on chemo.    # r/o organizing pneumonia  - Differential includes drug induced vs viral vs concern for PCP infection? Pulmonary consult appreciated.  - On empiric antibiotics with Zosyn and vancomycin  - Given concern for drug induced pneumonitis, will hold further chemotherapy for now.      # DLBCL  - Pt is s/p 5 cycles of R-CHOP, last dose given 11/22/21  - Given concern for drug induced pneumonitis, will hold further chemotherapy for now.  - Follows Dr. Catalina Mayen at [facility] [place].      Nella Mejia MD  PGY 6, Oncology/Hematology fellow  (P) [phone]  After 5pm, please contact on-call team.

## 2021-12-07 NOTE — PROGRESS NOTE ADULT - ASSESSMENT
65 y/o F with PMH of Lymphoma on R-CHOP(Last dose 2 weeks ago), OLEGARIO ( no CPAP for approx. 15 years), GERD, Osteoarthritis presents to the ED with complaint of chest pain and shortness of breath. Patient admitted for organizing pneumonia.

## 2021-12-07 NOTE — PATIENT PROFILE ADULT - DOMESTIC TRAVEL HIGH RISK QUESTION
"Called mom to follow up with her medication question.  Mom reports that Luzma had been taking Midol throughout the week.  Saturday she was at her grandma's house and was experiencing stabbing pain in her left arm.  Her pulse was 97bpm.  Mom had to come get her to take her to the ER because grandma reported that Luzma was pale and "couldn't function".  On the way to the ER, Luzma's chest started hurting.  In the ER she was given Toradol and sent home.  No symptoms since.  Mom is asking if the Midol could cause this.  Per Dr. Barroso that the caffiene in Midol could possibly cause her symptoms but not definitely.  She should avoid taking midol.  She can take Tylenol or Ibuprofen.  If she is taking a lot of ibuprofen she should follow up with her PCP.  Mom verbalized understanding.  "
----- Message from Urbano Barroso MD sent at 4/23/2019  9:00 AM CDT -----  Regarding: FW: ana maria Reddy  Contact: [phone]  Should be fine since it is intermittent as long as she doesn't have symptoms with it  ----- Message -----  From: Pao Brady MA  Sent: 4/22/2019  10:25 AM  To: Urbano Barroso MD  Subject: ana maria Reddy                                      Mom called and said Luzma has been taken off all caffeine, but she gave her Midol today and it has caffeine.  Mom wants to make sure it is OK for her to take.    
No

## 2021-12-07 NOTE — PATIENT PROFILE ADULT - FALL HARM RISK - HARM RISK INTERVENTIONS

## 2021-12-07 NOTE — CONSULT NOTE ADULT - SUBJECTIVE AND OBJECTIVE BOX
Hematology Consult Note    HPI:   67 y/o F with PMH of Lymphoma on R-CHOP (Last dose 2 weeks ago), OLEGARIO ( no CPAP for approx. 15 years), GERD, Osteoarthritis presents to the ED with complaint of chest pain and shortness of breath. Patient states that he has been experiencing chest pain for approximately 2 weeks. Patient states that chest pain occurs on exertion when walking. Patient states that he is only able to walk for 1-2 minutes before developing chest pain. Patient describes pain as "tightness", and denies radiation. Patient states that chest pain is associated with shortness of breath. Patient endorses resolution of chest pain when resting. Patient states that shortness of breath also occurs when taking deep breaths. Patient reports that he told his Oncologist this and he was sent to the ED to rule out a clot. Patient endorses non productive cough which he states he has for 5 days. Patient denies nasal congestion, fever and chills. Patient denies nausea, vomiting abdominal pain but does endorse some abdominal distention. Patient states last bowel movement was yesterday. Patient reports he is only Valacyclovir and Ondansetron at home. Patient denies leg pain and recent vital illness.    In ED CTA was done to rule PE. CTA was negative however patient was noted to have Lung findings favored to represent organizing pneumonia, commonly seen in the setting of pulmonary drug toxicity. Infection is also in the differential. CXR showed no acute pulmonary disease. Patient's CBC showing bandemia of neutrophils at 16.0. WBC normal at 7.49.   (06 Dec 2021 22:39)      Allergies    IV Contrast (Other)    Intolerances        MEDICATIONS  (STANDING):  heparin   Injectable 5000 Unit(s) SubCutaneous every 12 hours  influenza  Vaccine (HIGH DOSE) 0.7 milliLiter(s) IntraMuscular once  piperacillin/tazobactam IVPB.. 3.375 Gram(s) IV Intermittent every 8 hours  sodium chloride 0.9% lock flush 3 milliLiter(s) IV Push every 8 hours  vancomycin  IVPB 1000 milliGRAM(s) IV Intermittent every 24 hours    MEDICATIONS  (PRN):      PAST MEDICAL & SURGICAL HISTORY:  Hypercholesterolemia  patient reports resloved    History of Transient Ischemic Attack  2 times, &gt; 20 years ago    OA (osteoarthritis)    GERD (gastroesophageal reflux disease)    Unspecified B-cell lymphoma, unspecified site    OLEGARIO (obstructive sleep apnea)  does not use device    Obese    Total Hip Replacement  bilateral    S/P TKR (Total Knee Replacement)  biltaeral    S/P Appendectomy    H/O cervical spine surgery  metal hardware    H/O arthroscopy  left shoulder    History of back surgery  lumbar region- details unknown        FAMILY HISTORY:  FH: stomach cancer (Sibling)    FH: myocardial infarction (Sibling)        SOCIAL HISTORY: No EtOH, no tobacco    REVIEW OF SYSTEMS:    CONSTITUTIONAL: No weakness, fevers or chills  EYES/ENT: No visual changes;  No vertigo or throat pain   NECK: No pain or stiffness  RESPIRATORY: No cough, wheezing, hemoptysis; No shortness of breath  CARDIOVASCULAR: No chest pain or palpitations  GASTROINTESTINAL: No abdominal or epigastric pain. No nausea, vomiting, or hematemesis; No diarrhea or constipation. No melena or hematochezia.  GENITOURINARY: No dysuria, frequency or hematuria  NEUROLOGICAL: No numbness or weakness  SKIN: No itching, burning, rashes, or lesions   All other review of systems is negative unless indicated above.    Height (cm): 208.3 (12-06 @ 10:36)    T(F): 97.7 (12-07-21 @ 05:44), Max: 98 (12-06-21 @ 19:26)  HR: 72 (12-07-21 @ 05:44)  BP: 157/89 (12-07-21 @ 05:44)  RR: 18 (12-07-21 @ 05:44)  SpO2: 100% (12-07-21 @ 05:44)  Wt(kg): --    Physical Exam  GENERAL: NAD, well-developed  HEAD:  Atraumatic, Normocephalic  EYES: EOMI, PERRLA, conjunctiva and sclera clear  NECK: Supple, No JVD  CHEST/LUNG: Clear to auscultation bilaterally; No wheeze  HEART: Regular rate and rhythm; No murmurs, rubs, or gallops  ABDOMEN: Soft, Nontender, Nondistended; Bowel sounds present  EXTREMITIES:  2+ Peripheral Pulses, No clubbing, cyanosis, or edema  NEUROLOGY: non-focal  SKIN: No rashes or lesions                          11.8   6.78  )-----------( 360      ( 07 Dec 2021 06:42 )             36.4       12-07    136  |  101  |  14  ----------------------------<  109<H>  4.2   |  23  |  0.98    Ca    9.7      07 Dec 2021 06:42  Phos  3.2     12-07  Mg     2.10     12-07    TPro  7.7  /  Alb  4.2  /  TBili  0.4  /  DBili  x   /  AST  23  /  ALT  20  /  AlkPhos  77  12-06      Phosphorus Level, Serum: 3.2 mg/dL (12-07 @ 06:42)  Magnesium, Serum: 2.10 mg/dL (12-07 @ 06:42)        < from: CT Angio Chest PE Protocol w/ IV Cont (12.06.21 @ 18:01) >  IMPRESSION:    1.  No pulmonary thromboembolism    2.  Lung findings favored to represent organizing pneumonia, commonly seen in the setting of pulmonary drug toxicity. Infection is also in the differential.    3.  Indeterminate left hepatic hypoattenuation that is increased in size from 11/15/2021, without evidence of FDG avidity on 10/2/2021. MR abdomen with IV contrast can be performed for further evaluation if clinically warranted.    4.  No thoracic lymphadenopathy    < end of copied text >   Hematology Consult Note    HPI:  67 y/o male with PMH of Lymphoma on R-CHOP (Last dose 2 weeks ago), OLEGARIO ( no CPAP for approx. 15 years), GERD, Osteoarthritis presents to the ED with complaint of chest pain and shortness of breath. Patient states that he has been experiencing chest pain for approximately 2 weeks. Patient states that chest pain occurs on exertion when walking. Patient states that he is only able to walk for 1-2 minutes before developing chest pain. Patient describes pain as "tightness", and denies radiation. Patient states that chest pain is associated with shortness of breath. Patient endorses resolution of chest pain when resting. Patient states that shortness of breath also occurs when taking deep breaths. Patient reports that he told his Oncologist this and he was sent to the ED to rule out a clot. Patient endorses non productive cough which he states he has for 5 days. Patient denies nasal congestion, fever and chills. Patient denies nausea, vomiting abdominal pain but does endorse some abdominal distention. Patient states last bowel movement was yesterday. Patient reports he is only Valacyclovir and Ondansetron at home. Patient denies leg pain and recent vital illness.    In ED CTA was done to rule PE. CTA was negative however patient was noted to have Lung findings favored to represent organizing pneumonia, commonly seen in the setting of pulmonary drug toxicity. Infection is also in the differential. CXR showed no acute pulmonary disease. Patient's CBC showing bandemia of neutrophils at 16.0. WBC normal at 7.49.   (06 Dec 2021 22:39)      Allergies    IV Contrast (Other)    Intolerances        MEDICATIONS  (STANDING):  heparin   Injectable 5000 Unit(s) SubCutaneous every 12 hours  influenza  Vaccine (HIGH DOSE) 0.7 milliLiter(s) IntraMuscular once  piperacillin/tazobactam IVPB.. 3.375 Gram(s) IV Intermittent every 8 hours  sodium chloride 0.9% lock flush 3 milliLiter(s) IV Push every 8 hours  vancomycin  IVPB 1000 milliGRAM(s) IV Intermittent every 24 hours    MEDICATIONS  (PRN):      PAST MEDICAL & SURGICAL HISTORY:  Hypercholesterolemia  patient reports resloved    History of Transient Ischemic Attack  2 times, &gt; 20 years ago    OA (osteoarthritis)    GERD (gastroesophageal reflux disease)    Unspecified B-cell lymphoma, unspecified site    OLEGARIO (obstructive sleep apnea)  does not use device    Obese    Total Hip Replacement  bilateral    S/P TKR (Total Knee Replacement)  biltaeral    S/P Appendectomy    H/O cervical spine surgery  metal hardware    H/O arthroscopy  left shoulder    History of back surgery  lumbar region- details unknown        FAMILY HISTORY:  FH: stomach cancer (Sibling)    FH: myocardial infarction (Sibling)        SOCIAL HISTORY: No EtOH, no tobacco    REVIEW OF SYSTEMS:    CONSTITUTIONAL: No weakness, fevers or chills  EYES/ENT: No visual changes;  No vertigo or throat pain   NECK: No pain or stiffness  RESPIRATORY: pleuritic chest pain  CARDIOVASCULAR: No chest pain or palpitations  GASTROINTESTINAL: No abdominal or epigastric pain. No nausea, vomiting, or hematemesis; No diarrhea or constipation. No melena or hematochezia.  GENITOURINARY: No dysuria, frequency or hematuria  NEUROLOGICAL: No numbness or weakness  SKIN: No itching, burning, rashes, or lesions   All other review of systems is negative unless indicated above.    Height (cm): 208.3 (12-06 @ 10:36)    T(F): 97.7 (12-07-21 @ 05:44), Max: 98 (12-06-21 @ 19:26)  HR: 72 (12-07-21 @ 05:44)  BP: 157/89 (12-07-21 @ 05:44)  RR: 18 (12-07-21 @ 05:44)  SpO2: 100% (12-07-21 @ 05:44)  Wt(kg): --    Physical Exam  GENERAL: NAD, well-developed  HEAD:  Atraumatic, Normocephalic  EYES: EOMI, conjunctiva and sclera clear  NECK: Supple, No JVD  CHEST/LUNG: rales bilaterally; No wheeze  HEART: Regular rate and rhythm; No murmurs, rubs, or gallops  ABDOMEN: Soft, Nontender, Nondistended; Bowel sounds present  EXTREMITIES:  2+ Peripheral Pulses, No clubbing, cyanosis, or edema  NEUROLOGY: non-focal  SKIN: No rashes or lesions                          11.8   6.78  )-----------( 360      ( 07 Dec 2021 06:42 )             36.4       12-07    136  |  101  |  14  ----------------------------<  109<H>  4.2   |  23  |  0.98    Ca    9.7      07 Dec 2021 06:42  Phos  3.2     12-07  Mg     2.10     12-07    TPro  7.7  /  Alb  4.2  /  TBili  0.4  /  DBili  x   /  AST  23  /  ALT  20  /  AlkPhos  77  12-06      Phosphorus Level, Serum: 3.2 mg/dL (12-07 @ 06:42)  Magnesium, Serum: 2.10 mg/dL (12-07 @ 06:42)        < from: CT Angio Chest PE Protocol w/ IV Cont (12.06.21 @ 18:01) >  IMPRESSION:    1.  No pulmonary thromboembolism    2.  Lung findings favored to represent organizing pneumonia, commonly seen in the setting of pulmonary drug toxicity. Infection is also in the differential.    3.  Indeterminate left hepatic hypoattenuation that is increased in size from 11/15/2021, without evidence of FDG avidity on 10/2/2021. MR abdomen with IV contrast can be performed for further evaluation if clinically warranted.    4.  No thoracic lymphadenopathy    < end of copied text >

## 2021-12-07 NOTE — CONSULT NOTE ADULT - SUBJECTIVE AND OBJECTIVE BOX
CHIEF COMPLAINT: CP and SOB    HPI:  65 yo M PMH B-cell lymphoma on R-CHOP (last dose ~2 weeks ago), OLEGARIO not on CPAP, GERD, and OA presented to ED w/ worsening chest pain. Notes symptoms began approximately 2 weeks ago w/ relation to exertion and improvement w/ rest. Also w/ associated pleurisy and worsening non-productive cough x5 days. Evaluated by pt's oncologist and recommend for inpatient evaluation. In ED noted to be AF and normotensive with saturations of 98% on RA. Labs notable for bandemia to 16 w/ mildly elevated d-dimer of 321, RVP negative. CXR and subsequent CT performed showing bibasilar LL predominant peripheral GGOs concerning for possible inflammation vs. organizing pneumonia vs. infection. Pulmonary consulted for abnormal CT scan.     PAST MEDICAL & SURGICAL HISTORY:  Hypercholesterolemia  patient reports resloved    History of Transient Ischemic Attack  2 times, &gt; 20 years ago    OA (osteoarthritis)    GERD (gastroesophageal reflux disease)    Unspecified B-cell lymphoma, unspecified site    OLEGARIO (obstructive sleep apnea)  does not use device    Obese    Total Hip Replacement  bilateral    S/P TKR (Total Knee Replacement)  biltaeral    S/P Appendectomy    H/O cervical spine surgery  metal hardware    H/O arthroscopy  left shoulder    History of back surgery  lumbar region- details unknown        FAMILY HISTORY:  FH: stomach cancer (Sibling)    FH: myocardial infarction (Sibling)        SOCIAL HISTORY:  Smoking: [ ] Never Smoked [x] Former Smoker (__ packs x ___ years) [ ] Current Smoker  (__ packs x ___ years)  Substance Use: [ ] Never Used [x] Used _prior___  EtOH Use: None  Marital Status: [ ] Single [ ]  [ ]  [ ]   Sexual History:   Occupation:  Recent Travel:  Country of Birth:  Advance Directives:    Allergies    IV Contrast (Other)    Intolerances        HOME MEDICATIONS:  Home Medications:  ondansetron 8 mg oral tablet: 1 tab(s) orally 3 times a day, As Needed (06 Dec 2021 22:50)  valACYclovir 500 mg oral tablet: 1 tab(s) orally once a day (30 Aug 2021 09:49)      REVIEW OF SYSTEMS:  Constitutional: [ ] negative [ ] fevers [ ] chills [ ] weight loss [ ] weight gain  HEENT: [ ] negative [ ] dry eyes [ ] eye irritation [ ] postnasal drip [ ] nasal congestion  CV: [ ] negative  [ ] chest pain [ ] orthopnea [ ] palpitations [ ] murmur  Resp: [ ] negative [ ] cough [ ] shortness of breath [ ] dyspnea [ ] wheezing [ ] sputum [ ] hemoptysis  GI: [ ] negative [ ] nausea [ ] vomiting [ ] diarrhea [ ] constipation [ ] abd pain [ ] dysphagia   : [ ] negative [ ] dysuria [ ] nocturia [ ] hematuria [ ] increased urinary frequency  Musculoskeletal: [ ] negative [ ] back pain [ ] myalgias [ ] arthralgias [ ] fracture  Skin: [ ] negative [ ] rash [ ] itch  Neurological: [ ] negative [ ] headache [ ] dizziness [ ] syncope [ ] weakness [ ] numbness  Psychiatric: [ ] negative [ ] anxiety [ ] depression  Endocrine: [ ] negative [ ] diabetes [ ] thyroid problem  Hematologic/Lymphatic: [ ] negative [ ] anemia [ ] bleeding problem  Allergic/Immunologic: [ ] negative [ ] itchy eyes [ ] nasal discharge [ ] hives [ ] angioedema  [ ] All other systems negative  [ ] Unable to assess ROS because ________    OBJECTIVE:  ICU Vital Signs Last 24 Hrs  T(C): 36.5 (07 Dec 2021 13:36), Max: 36.7 (06 Dec 2021 19:26)  T(F): 97.7 (07 Dec 2021 13:36), Max: 98 (06 Dec 2021 19:26)  HR: 85 (07 Dec 2021 13:36) (70 - 85)  BP: 128/83 (07 Dec 2021 13:36) (117/76 - 157/89)  BP(mean): --  ABP: --  ABP(mean): --  RR: 16 (07 Dec 2021 13:36) (16 - 19)  SpO2: 97% (07 Dec 2021 13:36) (97% - 100%)        12-06 @ 07:01  -  12-07 @ 07:00  --------------------------------------------------------  IN: 0 mL / OUT: 450 mL / NET: -450 mL      CAPILLARY BLOOD GLUCOSE          PHYSICAL EXAM:  General:   HEENT:   Lymph Nodes:  Neck:   Respiratory:   Cardiovascular:   Abdomen:   Extremities:   Skin:   Neurological:  Psychiatry:    HOSPITAL MEDICATIONS:  Standing Meds:  heparin   Injectable 5000 Unit(s) SubCutaneous every 12 hours  influenza  Vaccine (HIGH DOSE) 0.7 milliLiter(s) IntraMuscular once  piperacillin/tazobactam IVPB.. 3.375 Gram(s) IV Intermittent every 8 hours  sodium chloride 0.9% lock flush 3 milliLiter(s) IV Push every 8 hours  vancomycin  IVPB 1000 milliGRAM(s) IV Intermittent every 24 hours      PRN Meds:  acetaminophen     Tablet .. 650 milliGRAM(s) Oral every 6 hours PRN      LABS:                        11.8   6.78  )-----------( 360      ( 07 Dec 2021 06:42 )             36.4     Hgb Trend: 11.8<--, 12.5<--  12-07    136  |  101  |  14  ----------------------------<  109<H>  4.2   |  23  |  0.98    Ca    9.7      07 Dec 2021 06:42  Phos  3.2     12-07  Mg     2.10     12-07    TPro  7.7  /  Alb  4.2  /  TBili  0.4  /  DBili  x   /  AST  23  /  ALT  20  /  AlkPhos  77  12-06    Creatinine Trend: 0.98<--, 1.19<--  PT/INR - ( 06 Dec 2021 11:35 )   PT: 13.9 sec;   INR: 1.22 ratio         PTT - ( 06 Dec 2021 11:35 )  PTT:31.3 sec      Venous Blood Gas:  12-06 @ 11:35  7.38/41/40/24/69.7  VBG Lactate: 1.9      MICROBIOLOGY:     Culture - Blood (collected 06 Dec 2021 14:39)  Source: .Blood Blood  Preliminary Report (07 Dec 2021 15:02):    No growth to date.    Culture - Blood (collected 06 Dec 2021 14:39)  Source: .Blood Blood  Preliminary Report (07 Dec 2021 15:02):    No growth to date.        RADIOLOGY:  [ ] Reviewed and interpreted by me    PULMONARY FUNCTION TESTS:    EKG: CHIEF COMPLAINT: CP and SOB    HPI:  67 yo M PMH B-cell lymphoma on R-CHOP (last dose ~2 weeks ago), OLEGARIO not on CPAP, GERD, and OA presented to ED w/ worsening chest pain. Notes symptoms began approximately 2 weeks ago w/ relation to exertion and improvement w/ rest. Also w/ associated pleurisy and worsening non-productive cough x5 days. Evaluated by pt's oncologist and recommend for inpatient evaluation. In ED noted to be AF and normotensive with saturations of 98% on RA. Labs notable for bandemia to 16 w/ mildly elevated d-dimer of 321, RVP negative. CXR and subsequent CT performed showing bibasilar LL predominant peripheral GGOs concerning for possible inflammation vs. organizing pneumonia vs. infection. Pulmonary consulted for abnormal CT scan.     PAST MEDICAL & SURGICAL HISTORY:  Hypercholesterolemia  patient reports resloved    History of Transient Ischemic Attack  2 times, &gt; 20 years ago    OA (osteoarthritis)    GERD (gastroesophageal reflux disease)    Unspecified B-cell lymphoma, unspecified site    OELGARIO (obstructive sleep apnea)  does not use device    Obese    Total Hip Replacement  bilateral    S/P TKR (Total Knee Replacement)  biltaeral    S/P Appendectomy    H/O cervical spine surgery  metal hardware    H/O arthroscopy  left shoulder    History of back surgery  lumbar region- details unknown        FAMILY HISTORY:  FH: stomach cancer (Sibling)    FH: myocardial infarction (Sibling)        SOCIAL HISTORY:  Smoking: [ ] Never Smoked [x] Former Smoker (__ packs x ___ years) [ ] Current Smoker  (__ packs x ___ years)  Substance Use: [ ] Never Used [x] Used _prior___  EtOH Use: None  Marital Status: [ ] Single [ ]  [ ]  [ ]   Sexual History:   Occupation:  Recent Travel:  Country of Birth:  Advance Directives:    Allergies    IV Contrast (Other)    Intolerances        HOME MEDICATIONS:  Home Medications:  ondansetron 8 mg oral tablet: 1 tab(s) orally 3 times a day, As Needed (06 Dec 2021 22:50)  valACYclovir 500 mg oral tablet: 1 tab(s) orally once a day (30 Aug 2021 09:49)      REVIEW OF SYSTEMS:  Constitutional: [ ] negative [ ] fevers [ ] chills [ ] weight loss [ ] weight gain  HEENT: [ ] negative [ ] dry eyes [ ] eye irritation [ ] postnasal drip [ ] nasal congestion  CV: [ ] negative  [ ] chest pain [ ] orthopnea [ ] palpitations [ ] murmur  Resp: [ ] negative [ ] cough [ ] shortness of breath [ ] dyspnea [ ] wheezing [ ] sputum [ ] hemoptysis  GI: [ ] negative [ ] nausea [ ] vomiting [ ] diarrhea [ ] constipation [ ] abd pain [ ] dysphagia   : [ ] negative [ ] dysuria [ ] nocturia [ ] hematuria [ ] increased urinary frequency  Musculoskeletal: [ ] negative [ ] back pain [ ] myalgias [ ] arthralgias [ ] fracture  Skin: [ ] negative [ ] rash [ ] itch  Neurological: [ ] negative [ ] headache [ ] dizziness [ ] syncope [ ] weakness [ ] numbness  Psychiatric: [ ] negative [ ] anxiety [ ] depression  Endocrine: [ ] negative [ ] diabetes [ ] thyroid problem  Hematologic/Lymphatic: [ ] negative [ ] anemia [ ] bleeding problem  Allergic/Immunologic: [ ] negative [ ] itchy eyes [ ] nasal discharge [ ] hives [ ] angioedema  [x] All other systems negative  [ ] Unable to assess ROS because ________    OBJECTIVE:  ICU Vital Signs Last 24 Hrs  T(C): 36.5 (07 Dec 2021 13:36), Max: 36.7 (06 Dec 2021 19:26)  T(F): 97.7 (07 Dec 2021 13:36), Max: 98 (06 Dec 2021 19:26)  HR: 85 (07 Dec 2021 13:36) (70 - 85)  BP: 128/83 (07 Dec 2021 13:36) (117/76 - 157/89)  BP(mean): --  ABP: --  ABP(mean): --  RR: 16 (07 Dec 2021 13:36) (16 - 19)  SpO2: 97% (07 Dec 2021 13:36) (97% - 100%)        12-06 @ 07:01  -  12-07 @ 07:00  --------------------------------------------------------  IN: 0 mL / OUT: 450 mL / NET: -450 mL      CAPILLARY BLOOD GLUCOSE          PHYSICAL EXAM:  General: Male, NAD  HEENT: EOMI  Respiratory: Scattered wheezes  Cardiovascular: RRR no mrg  Abdomen: Soft  Extremities: WWP  Skin: Intact  Neurological: AOx3  Psychiatry: Normal    HOSPITAL MEDICATIONS:  Standing Meds:  heparin   Injectable 5000 Unit(s) SubCutaneous every 12 hours  influenza  Vaccine (HIGH DOSE) 0.7 milliLiter(s) IntraMuscular once  piperacillin/tazobactam IVPB.. 3.375 Gram(s) IV Intermittent every 8 hours  sodium chloride 0.9% lock flush 3 milliLiter(s) IV Push every 8 hours  vancomycin  IVPB 1000 milliGRAM(s) IV Intermittent every 24 hours      PRN Meds:  acetaminophen     Tablet .. 650 milliGRAM(s) Oral every 6 hours PRN      LABS:                        11.8   6.78  )-----------( 360      ( 07 Dec 2021 06:42 )             36.4     Hgb Trend: 11.8<--, 12.5<--  12-07    136  |  101  |  14  ----------------------------<  109<H>  4.2   |  23  |  0.98    Ca    9.7      07 Dec 2021 06:42  Phos  3.2     12-07  Mg     2.10     12-07    TPro  7.7  /  Alb  4.2  /  TBili  0.4  /  DBili  x   /  AST  23  /  ALT  20  /  AlkPhos  77  12-06    Creatinine Trend: 0.98<--, 1.19<--  PT/INR - ( 06 Dec 2021 11:35 )   PT: 13.9 sec;   INR: 1.22 ratio         PTT - ( 06 Dec 2021 11:35 )  PTT:31.3 sec      Venous Blood Gas:  12-06 @ 11:35  7.38/41/40/24/69.7  VBG Lactate: 1.9      MICROBIOLOGY:     Culture - Blood (collected 06 Dec 2021 14:39)  Source: .Blood Blood  Preliminary Report (07 Dec 2021 15:02):    No growth to date.    Culture - Blood (collected 06 Dec 2021 14:39)  Source: .Blood Blood  Preliminary Report (07 Dec 2021 15:02):    No growth to date.        RADIOLOGY:  [ ] Reviewed and interpreted by me    PULMONARY FUNCTION TESTS:    EKG:

## 2021-12-07 NOTE — CONSULT NOTE ADULT - ATTENDING COMMENTS
66M PMH B-cell lymphoma s/p 5 cycles of R-CHOP (last in Nov 2021) who presents with pleuritic chest pain and non-productive cough with associated myalgias and generalized malaise for the past 2 weeks. He is afebrile and hemodynamically stable. No hypoxemia on room air with SpO2 98%. Labs with bandemia (16%). Blood cultures and RVP negative. CT chest with peripheral lower lobe predominant ill-defined groundglass densities bilaterally, which can possibly represent organizing pneumonia vs. infection (e.g., viral or atypical bacterial).     Agree with empiric vancomycin and zosyn. Would check nasal MRSA and d/c vancomycin if negative. Try to obtain sputum culture. Check urine legionella. Consider empiric azithromycin 500 mg IV daily. It is possible that radiographic findings represent organizing pneumonia due to rituximab or cyclophosphamide. Would repeat CT chest in 4-6 weeks to assess for resolution. Hold off on bronchoscopy for now, but if symptoms persist or worsen or radiographic opacities persist, he may require bronchoscopy with BAL/TBBx. Hold off on steroids at this time pending infectious workup. Will continue to follow. Discussed with patient, daughter, and Dr. Mayen. 66M PMH B-cell lymphoma s/p 5 cycles of R-CHOP (last in Nov 2021) who presents with pleuritic chest pain and non-productive cough with associated myalgias and generalized malaise for the past 2 weeks. He is afebrile and hemodynamically stable. No hypoxemia on room air with SpO2 98%. Labs with bandemia (16%). Blood cultures and RVP negative. CT chest with peripheral lower lobe predominant ill-defined groundglass densities bilaterally, which can possibly represent organizing pneumonia vs. infection (e.g., viral or atypical bacterial).     Agree with empiric vancomycin and zosyn. Would check nasal MRSA and d/c vancomycin if negative. Try to obtain sputum culture. Check urine legionella. Consider empiric azithromycin 500 mg IV daily. It is possible that radiographic findings represent organizing pneumonia due to rituximab or cyclophosphamide. Would repeat CT chest in 4-6 weeks to assess for resolution. Hold off on bronchoscopy for now, but if symptoms persist or worsen or radiographic opacities persist, he may require bronchoscopy with BAL/TBBx. Would start albuterol-ipratropium nebs q6h + budesonide nebs 0.5 mg q12h. Hold off on steroids at this time pending infectious workup. Will continue to follow. Discussed with patient, daughter, and Dr. Mayen.

## 2021-12-08 LAB
ANION GAP SERPL CALC-SCNC: 13 MMOL/L — SIGNIFICANT CHANGE UP (ref 7–14)
BASOPHILS # BLD AUTO: 0.06 K/UL — SIGNIFICANT CHANGE UP (ref 0–0.2)
BASOPHILS NFR BLD AUTO: 0.8 % — SIGNIFICANT CHANGE UP (ref 0–2)
BUN SERPL-MCNC: 18 MG/DL — SIGNIFICANT CHANGE UP (ref 7–23)
CALCIUM SERPL-MCNC: 9.2 MG/DL — SIGNIFICANT CHANGE UP (ref 8.4–10.5)
CHLORIDE SERPL-SCNC: 105 MMOL/L — SIGNIFICANT CHANGE UP (ref 98–107)
CO2 SERPL-SCNC: 21 MMOL/L — LOW (ref 22–31)
CREAT SERPL-MCNC: 1.34 MG/DL — HIGH (ref 0.5–1.3)
EOSINOPHIL # BLD AUTO: 0.01 K/UL — SIGNIFICANT CHANGE UP (ref 0–0.5)
EOSINOPHIL NFR BLD AUTO: 0.1 % — SIGNIFICANT CHANGE UP (ref 0–6)
GLUCOSE SERPL-MCNC: 95 MG/DL — SIGNIFICANT CHANGE UP (ref 70–99)
HCT VFR BLD CALC: 38.1 % — LOW (ref 39–50)
HGB BLD-MCNC: 12.2 G/DL — LOW (ref 13–17)
IANC: 5.18 K/UL — SIGNIFICANT CHANGE UP (ref 1.5–8.5)
IMM GRANULOCYTES NFR BLD AUTO: 11.6 % — HIGH (ref 0–1.5)
LYMPHOCYTES # BLD AUTO: 0.32 K/UL — LOW (ref 1–3.3)
LYMPHOCYTES # BLD AUTO: 4.5 % — LOW (ref 13–44)
MAGNESIUM SERPL-MCNC: 2 MG/DL — SIGNIFICANT CHANGE UP (ref 1.6–2.6)
MCHC RBC-ENTMCNC: 29.7 PG — SIGNIFICANT CHANGE UP (ref 27–34)
MCHC RBC-ENTMCNC: 32 GM/DL — SIGNIFICANT CHANGE UP (ref 32–36)
MCV RBC AUTO: 92.7 FL — SIGNIFICANT CHANGE UP (ref 80–100)
MONOCYTES # BLD AUTO: 0.74 K/UL — SIGNIFICANT CHANGE UP (ref 0–0.9)
MONOCYTES NFR BLD AUTO: 10.4 % — SIGNIFICANT CHANGE UP (ref 2–14)
NEUTROPHILS # BLD AUTO: 5.18 K/UL — SIGNIFICANT CHANGE UP (ref 1.8–7.4)
NEUTROPHILS NFR BLD AUTO: 72.6 % — SIGNIFICANT CHANGE UP (ref 43–77)
NRBC # BLD: 0 /100 WBCS — SIGNIFICANT CHANGE UP
NRBC # FLD: 0 K/UL — SIGNIFICANT CHANGE UP
PHOSPHATE SERPL-MCNC: 4.2 MG/DL — SIGNIFICANT CHANGE UP (ref 2.5–4.5)
PLATELET # BLD AUTO: 420 K/UL — HIGH (ref 150–400)
POTASSIUM SERPL-MCNC: 4.1 MMOL/L — SIGNIFICANT CHANGE UP (ref 3.5–5.3)
POTASSIUM SERPL-SCNC: 4.1 MMOL/L — SIGNIFICANT CHANGE UP (ref 3.5–5.3)
RBC # BLD: 4.11 M/UL — LOW (ref 4.2–5.8)
RBC # FLD: 14.8 % — HIGH (ref 10.3–14.5)
SODIUM SERPL-SCNC: 139 MMOL/L — SIGNIFICANT CHANGE UP (ref 135–145)
VANCOMYCIN TROUGH SERPL-MCNC: 5.2 UG/ML — LOW (ref 10–20)
WBC # BLD: 7.14 K/UL — SIGNIFICANT CHANGE UP (ref 3.8–10.5)
WBC # FLD AUTO: 7.14 K/UL — SIGNIFICANT CHANGE UP (ref 3.8–10.5)

## 2021-12-08 PROCEDURE — 99233 SBSQ HOSP IP/OBS HIGH 50: CPT

## 2021-12-08 PROCEDURE — 93970 EXTREMITY STUDY: CPT | Mod: 26

## 2021-12-08 PROCEDURE — 99233 SBSQ HOSP IP/OBS HIGH 50: CPT | Mod: GC

## 2021-12-08 RX ORDER — BUDESONIDE, MICRONIZED 100 %
0.5 POWDER (GRAM) MISCELLANEOUS EVERY 12 HOURS
Refills: 0 | Status: DISCONTINUED | OUTPATIENT
Start: 2021-12-08 | End: 2021-12-11

## 2021-12-08 RX ORDER — IPRATROPIUM/ALBUTEROL SULFATE 18-103MCG
3 AEROSOL WITH ADAPTER (GRAM) INHALATION EVERY 6 HOURS
Refills: 0 | Status: DISCONTINUED | OUTPATIENT
Start: 2021-12-08 | End: 2021-12-11

## 2021-12-08 RX ADMIN — Medication 250 MILLIGRAM(S): at 20:28

## 2021-12-08 RX ADMIN — Medication 0.5 MILLIGRAM(S): at 22:30

## 2021-12-08 RX ADMIN — PANTOPRAZOLE SODIUM 40 MILLIGRAM(S): 20 TABLET, DELAYED RELEASE ORAL at 05:40

## 2021-12-08 RX ADMIN — SODIUM CHLORIDE 3 MILLILITER(S): 9 INJECTION INTRAMUSCULAR; INTRAVENOUS; SUBCUTANEOUS at 05:33

## 2021-12-08 RX ADMIN — HEPARIN SODIUM 5000 UNIT(S): 5000 INJECTION INTRAVENOUS; SUBCUTANEOUS at 05:40

## 2021-12-08 RX ADMIN — Medication 650 MILLIGRAM(S): at 14:12

## 2021-12-08 RX ADMIN — HEPARIN SODIUM 5000 UNIT(S): 5000 INJECTION INTRAVENOUS; SUBCUTANEOUS at 17:18

## 2021-12-08 RX ADMIN — Medication 3 MILLILITER(S): at 16:40

## 2021-12-08 RX ADMIN — PIPERACILLIN AND TAZOBACTAM 25 GRAM(S): 4; .5 INJECTION, POWDER, LYOPHILIZED, FOR SOLUTION INTRAVENOUS at 05:40

## 2021-12-08 RX ADMIN — SODIUM CHLORIDE 3 MILLILITER(S): 9 INJECTION INTRAMUSCULAR; INTRAVENOUS; SUBCUTANEOUS at 14:01

## 2021-12-08 RX ADMIN — VALACYCLOVIR 500 MILLIGRAM(S): 500 TABLET, FILM COATED ORAL at 14:13

## 2021-12-08 RX ADMIN — PIPERACILLIN AND TAZOBACTAM 25 GRAM(S): 4; .5 INJECTION, POWDER, LYOPHILIZED, FOR SOLUTION INTRAVENOUS at 14:13

## 2021-12-08 RX ADMIN — SODIUM CHLORIDE 3 MILLILITER(S): 9 INJECTION INTRAMUSCULAR; INTRAVENOUS; SUBCUTANEOUS at 22:07

## 2021-12-08 RX ADMIN — Medication 650 MILLIGRAM(S): at 15:10

## 2021-12-08 RX ADMIN — Medication 3 MILLILITER(S): at 22:30

## 2021-12-08 RX ADMIN — PIPERACILLIN AND TAZOBACTAM 25 GRAM(S): 4; .5 INJECTION, POWDER, LYOPHILIZED, FOR SOLUTION INTRAVENOUS at 23:08

## 2021-12-08 NOTE — PROGRESS NOTE ADULT - NUTRITIONAL ASSESSMENT
65 yo M PMH B-cell lymphoma on R-CHOP (last dose ~2 weeks ago), OLEGARIO not on CPAP, GERD, and OA presented to ED w/ worsening chest pain found to have bilateral GGOs on CT scan    - CT reviewed, per radiology concern for possible underlying organizing pneumonia, though less likely given recent steroid use w/ chemo  - given immunosuppression agree w/ empiric abx for now, RVP negative; would send off sputum culture and urine legionella  - consider addition of azithro, send off MRSA nares and can likely DC vanc if negative  - ddx includes possible drug induced pneumonitis  - will defer steroids for now pending infectious workup  - please start duonebs and nebulized budesonide  - will need repeat CT in 6-8 weeks for monitoring    Herbert Cordero MD  PGY-5  PCCM Fellow  Pager [phone]

## 2021-12-08 NOTE — PROGRESS NOTE ADULT - ASSESSMENT
CHIEF COMPLAINT: SOB, cough    Interval Events:  Feels better today. Nonhypoxemic    REVIEW OF SYSTEMS:  Constitutional: [ ] negative [ ] fevers [ ] chills [ ] weight loss [ ] weight gain  HEENT: [ ] negative [ ] dry eyes [ ] eye irritation [ ] postnasal drip [ ] nasal congestion  CV: [ ] negative  [ ] chest pain [ ] orthopnea [ ] palpitations [ ] murmur  Resp: [ ] negative [x] cough [x] shortness of breath [ ] dyspnea [ ] wheezing [ ] sputum [ ] hemoptysis  GI: [ ] negative [ ] nausea [ ] vomiting [ ] diarrhea [ ] constipation [ ] abd pain [ ] dysphagia   : [ ] negative [ ] dysuria [ ] nocturia [ ] hematuria [ ] increased urinary frequency  Musculoskeletal: [ ] negative [ ] back pain [ ] myalgias [ ] arthralgias [ ] fracture  Skin: [ ] negative [ ] rash [ ] itch  Neurological: [ ] negative [ ] headache [ ] dizziness [ ] syncope [ ] weakness [ ] numbness  Psychiatric: [ ] negative [ ] anxiety [ ] depression  Endocrine: [ ] negative [ ] diabetes [ ] thyroid problem  Hematologic/Lymphatic: [ ] negative [ ] anemia [ ] bleeding problem  Allergic/Immunologic: [ ] negative [ ] itchy eyes [ ] nasal discharge [ ] hives [ ] angioedema  [x] All other systems negative  [ ] Unable to assess ROS because ________    OBJECTIVE:  ICU Vital Signs Last 24 Hrs  T(C): 36.9 (08 Dec 2021 12:08), Max: 38.2 (07 Dec 2021 22:39)  T(F): 98.5 (08 Dec 2021 12:08), Max: 100.8 (07 Dec 2021 22:39)  HR: 81 (08 Dec 2021 12:08) (80 - 98)  BP: 121/73 (08 Dec 2021 12:08) (121/73 - 143/73)  BP(mean): --  ABP: --  ABP(mean): --  RR: 18 (08 Dec 2021 12:08) (18 - 18)  SpO2: 100% (08 Dec 2021 12:08) (99% - 100%)        CAPILLARY BLOOD GLUCOSE          PHYSICAL EXAM:  General: Male, NAD  HEENT: EOMI  Respiratory: Clear  Cardiovascular: RR no mrg  Abdomen: Soft  Extremities: WWP  Skin: Intact   Neurological: AOx3  Psychiatry: Normal    HOSPITAL MEDICATIONS:  MEDICATIONS  (STANDING):  albuterol/ipratropium for Nebulization 3 milliLiter(s) Nebulizer every 6 hours  buDESOnide    Inhalation Suspension 0.5 milliGRAM(s) Inhalation every 12 hours  heparin   Injectable 5000 Unit(s) SubCutaneous every 12 hours  influenza  Vaccine (HIGH DOSE) 0.7 milliLiter(s) IntraMuscular once  pantoprazole    Tablet 40 milliGRAM(s) Oral before breakfast  piperacillin/tazobactam IVPB.. 3.375 Gram(s) IV Intermittent every 8 hours  sodium chloride 0.9% lock flush 3 milliLiter(s) IV Push every 8 hours  valACYclovir      valACYclovir 500 milliGRAM(s) Oral daily  vancomycin  IVPB 1000 milliGRAM(s) IV Intermittent every 24 hours    MEDICATIONS  (PRN):  acetaminophen     Tablet .. 650 milliGRAM(s) Oral every 6 hours PRN Temp greater or equal to 38C (100.4F), Mild Pain (1 - 3)      LABS:                        12.2   7.14  )-----------( 420      ( 08 Dec 2021 06:40 )             38.1     Hgb Trend: 12.2<--, 11.8<--, 12.5<--  12-08    139  |  105  |  18  ----------------------------<  95  4.1   |  21<L>  |  1.34<H>    Ca    9.2      08 Dec 2021 06:40  Phos  4.2     12-08  Mg     2.00     12-08      Creatinine Trend: 1.34<--, 0.98<--, 1.19<--            MICROBIOLOGY:     Culture - Blood (collected 06 Dec 2021 14:39)  Source: .Blood Blood  Preliminary Report (07 Dec 2021 15:02):    No growth to date.    Culture - Blood (collected 06 Dec 2021 14:39)  Source: .Blood Blood  Preliminary Report (07 Dec 2021 15:02):    No growth to date.        RADIOLOGY:  [ ] Reviewed and interpreted by me    PULMONARY FUNCTION TESTS:    EKG:

## 2021-12-09 ENCOUNTER — APPOINTMENT (OUTPATIENT)
Dept: HEMATOLOGY ONCOLOGY | Facility: CLINIC | Age: 66
End: 2021-12-09

## 2021-12-09 DIAGNOSIS — N17.9 ACUTE KIDNEY FAILURE, UNSPECIFIED: ICD-10-CM

## 2021-12-09 LAB
ANION GAP SERPL CALC-SCNC: 14 MMOL/L — SIGNIFICANT CHANGE UP (ref 7–14)
APPEARANCE UR: CLEAR — SIGNIFICANT CHANGE UP
BILIRUB UR-MCNC: NEGATIVE — SIGNIFICANT CHANGE UP
BUN SERPL-MCNC: 12 MG/DL — SIGNIFICANT CHANGE UP (ref 7–23)
CALCIUM SERPL-MCNC: 9.1 MG/DL — SIGNIFICANT CHANGE UP (ref 8.4–10.5)
CHLORIDE SERPL-SCNC: 99 MMOL/L — SIGNIFICANT CHANGE UP (ref 98–107)
CO2 SERPL-SCNC: 22 MMOL/L — SIGNIFICANT CHANGE UP (ref 22–31)
COLOR SPEC: SIGNIFICANT CHANGE UP
CREAT ?TM UR-MCNC: 73 MG/DL — SIGNIFICANT CHANGE UP
CREAT SERPL-MCNC: 1.32 MG/DL — HIGH (ref 0.5–1.3)
CULTURE RESULTS: NO GROWTH — SIGNIFICANT CHANGE UP
DIFF PNL FLD: NEGATIVE — SIGNIFICANT CHANGE UP
GLUCOSE SERPL-MCNC: 95 MG/DL — SIGNIFICANT CHANGE UP (ref 70–99)
GLUCOSE UR QL: NEGATIVE — SIGNIFICANT CHANGE UP
GRAM STN FLD: SIGNIFICANT CHANGE UP
HCT VFR BLD CALC: 35.4 % — LOW (ref 39–50)
HGB BLD-MCNC: 11.5 G/DL — LOW (ref 13–17)
KETONES UR-MCNC: NEGATIVE — SIGNIFICANT CHANGE UP
LEGIONELLA AG UR QL: NEGATIVE — SIGNIFICANT CHANGE UP
LEUKOCYTE ESTERASE UR-ACNC: NEGATIVE — SIGNIFICANT CHANGE UP
MAGNESIUM SERPL-MCNC: 1.9 MG/DL — SIGNIFICANT CHANGE UP (ref 1.6–2.6)
MCHC RBC-ENTMCNC: 30.2 PG — SIGNIFICANT CHANGE UP (ref 27–34)
MCHC RBC-ENTMCNC: 32.5 GM/DL — SIGNIFICANT CHANGE UP (ref 32–36)
MCV RBC AUTO: 92.9 FL — SIGNIFICANT CHANGE UP (ref 80–100)
MRSA PCR RESULT.: SIGNIFICANT CHANGE UP
NITRITE UR-MCNC: NEGATIVE — SIGNIFICANT CHANGE UP
NRBC # BLD: 0 /100 WBCS — SIGNIFICANT CHANGE UP
NRBC # FLD: 0 K/UL — SIGNIFICANT CHANGE UP
PH UR: 7 — SIGNIFICANT CHANGE UP (ref 5–8)
PHOSPHATE SERPL-MCNC: 3.3 MG/DL — SIGNIFICANT CHANGE UP (ref 2.5–4.5)
PLATELET # BLD AUTO: 418 K/UL — HIGH (ref 150–400)
POTASSIUM SERPL-MCNC: 4 MMOL/L — SIGNIFICANT CHANGE UP (ref 3.5–5.3)
POTASSIUM SERPL-SCNC: 4 MMOL/L — SIGNIFICANT CHANGE UP (ref 3.5–5.3)
PROT ?TM UR-MCNC: 8 MG/DL — SIGNIFICANT CHANGE UP
PROT UR-MCNC: NEGATIVE — SIGNIFICANT CHANGE UP
PROT/CREAT UR-RTO: 0.1 RATIO — SIGNIFICANT CHANGE UP (ref 0–0.2)
RBC # BLD: 3.81 M/UL — LOW (ref 4.2–5.8)
RBC # FLD: 15 % — HIGH (ref 10.3–14.5)
S AUREUS DNA NOSE QL NAA+PROBE: SIGNIFICANT CHANGE UP
SODIUM SERPL-SCNC: 135 MMOL/L — SIGNIFICANT CHANGE UP (ref 135–145)
SP GR SPEC: 1.01 — SIGNIFICANT CHANGE UP (ref 1–1.05)
SPECIMEN SOURCE: SIGNIFICANT CHANGE UP
SPECIMEN SOURCE: SIGNIFICANT CHANGE UP
UROBILINOGEN FLD QL: SIGNIFICANT CHANGE UP
WBC # BLD: 7.75 K/UL — SIGNIFICANT CHANGE UP (ref 3.8–10.5)
WBC # FLD AUTO: 7.75 K/UL — SIGNIFICANT CHANGE UP (ref 3.8–10.5)

## 2021-12-09 PROCEDURE — 93306 TTE W/DOPPLER COMPLETE: CPT | Mod: 26

## 2021-12-09 PROCEDURE — 99233 SBSQ HOSP IP/OBS HIGH 50: CPT

## 2021-12-09 RX ORDER — AZITHROMYCIN 500 MG/1
250 TABLET, FILM COATED ORAL EVERY 24 HOURS
Refills: 0 | Status: DISCONTINUED | OUTPATIENT
Start: 2021-12-09 | End: 2021-12-09

## 2021-12-09 RX ORDER — SENNA PLUS 8.6 MG/1
2 TABLET ORAL AT BEDTIME
Refills: 0 | Status: DISCONTINUED | OUTPATIENT
Start: 2021-12-09 | End: 2021-12-11

## 2021-12-09 RX ORDER — POLYETHYLENE GLYCOL 3350 17 G/17G
17 POWDER, FOR SOLUTION ORAL DAILY
Refills: 0 | Status: DISCONTINUED | OUTPATIENT
Start: 2021-12-09 | End: 2021-12-11

## 2021-12-09 RX ADMIN — Medication 250 MILLIGRAM(S): at 20:09

## 2021-12-09 RX ADMIN — PANTOPRAZOLE SODIUM 40 MILLIGRAM(S): 20 TABLET, DELAYED RELEASE ORAL at 06:27

## 2021-12-09 RX ADMIN — PIPERACILLIN AND TAZOBACTAM 25 GRAM(S): 4; .5 INJECTION, POWDER, LYOPHILIZED, FOR SOLUTION INTRAVENOUS at 22:56

## 2021-12-09 RX ADMIN — SODIUM CHLORIDE 3 MILLILITER(S): 9 INJECTION INTRAMUSCULAR; INTRAVENOUS; SUBCUTANEOUS at 22:06

## 2021-12-09 RX ADMIN — HEPARIN SODIUM 5000 UNIT(S): 5000 INJECTION INTRAVENOUS; SUBCUTANEOUS at 18:02

## 2021-12-09 RX ADMIN — PIPERACILLIN AND TAZOBACTAM 25 GRAM(S): 4; .5 INJECTION, POWDER, LYOPHILIZED, FOR SOLUTION INTRAVENOUS at 14:58

## 2021-12-09 RX ADMIN — Medication 0.5 MILLIGRAM(S): at 21:12

## 2021-12-09 RX ADMIN — Medication 3 MILLILITER(S): at 03:59

## 2021-12-09 RX ADMIN — AZITHROMYCIN 250 MILLIGRAM(S): 500 TABLET, FILM COATED ORAL at 15:08

## 2021-12-09 RX ADMIN — Medication 3 MILLILITER(S): at 21:12

## 2021-12-09 RX ADMIN — Medication 0.5 MILLIGRAM(S): at 10:29

## 2021-12-09 RX ADMIN — VALACYCLOVIR 500 MILLIGRAM(S): 500 TABLET, FILM COATED ORAL at 13:33

## 2021-12-09 RX ADMIN — SENNA PLUS 2 TABLET(S): 8.6 TABLET ORAL at 22:56

## 2021-12-09 RX ADMIN — SODIUM CHLORIDE 3 MILLILITER(S): 9 INJECTION INTRAMUSCULAR; INTRAVENOUS; SUBCUTANEOUS at 05:36

## 2021-12-09 RX ADMIN — Medication 3 MILLILITER(S): at 10:29

## 2021-12-09 RX ADMIN — SODIUM CHLORIDE 3 MILLILITER(S): 9 INJECTION INTRAMUSCULAR; INTRAVENOUS; SUBCUTANEOUS at 15:09

## 2021-12-09 RX ADMIN — HEPARIN SODIUM 5000 UNIT(S): 5000 INJECTION INTRAVENOUS; SUBCUTANEOUS at 06:27

## 2021-12-09 RX ADMIN — Medication 3 MILLILITER(S): at 17:09

## 2021-12-09 RX ADMIN — PIPERACILLIN AND TAZOBACTAM 25 GRAM(S): 4; .5 INJECTION, POWDER, LYOPHILIZED, FOR SOLUTION INTRAVENOUS at 06:50

## 2021-12-09 RX ADMIN — POLYETHYLENE GLYCOL 3350 17 GRAM(S): 17 POWDER, FOR SOLUTION ORAL at 18:08

## 2021-12-10 ENCOUNTER — TRANSCRIPTION ENCOUNTER (OUTPATIENT)
Age: 66
End: 2021-12-10

## 2021-12-10 LAB
ALBUMIN SERPL ELPH-MCNC: 3.6 G/DL — SIGNIFICANT CHANGE UP (ref 3.3–5)
ALP SERPL-CCNC: 58 U/L — SIGNIFICANT CHANGE UP (ref 40–120)
ALT FLD-CCNC: 15 U/L — SIGNIFICANT CHANGE UP (ref 4–41)
ANION GAP SERPL CALC-SCNC: 10 MMOL/L — SIGNIFICANT CHANGE UP (ref 7–14)
AST SERPL-CCNC: 19 U/L — SIGNIFICANT CHANGE UP (ref 4–40)
BILIRUB SERPL-MCNC: 0.4 MG/DL — SIGNIFICANT CHANGE UP (ref 0.2–1.2)
BUN SERPL-MCNC: 11 MG/DL — SIGNIFICANT CHANGE UP (ref 7–23)
CALCIUM SERPL-MCNC: 8.9 MG/DL — SIGNIFICANT CHANGE UP (ref 8.4–10.5)
CHLORIDE SERPL-SCNC: 102 MMOL/L — SIGNIFICANT CHANGE UP (ref 98–107)
CO2 SERPL-SCNC: 23 MMOL/L — SIGNIFICANT CHANGE UP (ref 22–31)
CREAT SERPL-MCNC: 1.21 MG/DL — SIGNIFICANT CHANGE UP (ref 0.5–1.3)
CULTURE RESULTS: SIGNIFICANT CHANGE UP
CULTURE RESULTS: SIGNIFICANT CHANGE UP
GLUCOSE SERPL-MCNC: 109 MG/DL — HIGH (ref 70–99)
HCT VFR BLD CALC: 35.9 % — LOW (ref 39–50)
HGB BLD-MCNC: 11.4 G/DL — LOW (ref 13–17)
MAGNESIUM SERPL-MCNC: 2.1 MG/DL — SIGNIFICANT CHANGE UP (ref 1.6–2.6)
MCHC RBC-ENTMCNC: 29.6 PG — SIGNIFICANT CHANGE UP (ref 27–34)
MCHC RBC-ENTMCNC: 31.8 GM/DL — LOW (ref 32–36)
MCV RBC AUTO: 93.2 FL — SIGNIFICANT CHANGE UP (ref 80–100)
NRBC # BLD: 0 /100 WBCS — SIGNIFICANT CHANGE UP
NRBC # FLD: 0 K/UL — SIGNIFICANT CHANGE UP
PHOSPHATE SERPL-MCNC: 3.1 MG/DL — SIGNIFICANT CHANGE UP (ref 2.5–4.5)
PLATELET # BLD AUTO: 423 K/UL — HIGH (ref 150–400)
POTASSIUM SERPL-MCNC: 4.2 MMOL/L — SIGNIFICANT CHANGE UP (ref 3.5–5.3)
POTASSIUM SERPL-SCNC: 4.2 MMOL/L — SIGNIFICANT CHANGE UP (ref 3.5–5.3)
PROT SERPL-MCNC: 6.9 G/DL — SIGNIFICANT CHANGE UP (ref 6–8.3)
RBC # BLD: 3.85 M/UL — LOW (ref 4.2–5.8)
RBC # FLD: 15 % — HIGH (ref 10.3–14.5)
SODIUM SERPL-SCNC: 135 MMOL/L — SIGNIFICANT CHANGE UP (ref 135–145)
SPECIMEN SOURCE: SIGNIFICANT CHANGE UP
SPECIMEN SOURCE: SIGNIFICANT CHANGE UP
WBC # BLD: 8.55 K/UL — SIGNIFICANT CHANGE UP (ref 3.8–10.5)
WBC # FLD AUTO: 8.55 K/UL — SIGNIFICANT CHANGE UP (ref 3.8–10.5)

## 2021-12-10 PROCEDURE — 99232 SBSQ HOSP IP/OBS MODERATE 35: CPT

## 2021-12-10 PROCEDURE — 99233 SBSQ HOSP IP/OBS HIGH 50: CPT | Mod: GC

## 2021-12-10 RX ORDER — FLUTICASONE PROPIONATE 50 MCG
1 SPRAY, SUSPENSION NASAL
Refills: 0 | Status: DISCONTINUED | OUTPATIENT
Start: 2021-12-10 | End: 2021-12-11

## 2021-12-10 RX ADMIN — Medication 0.5 MILLIGRAM(S): at 21:38

## 2021-12-10 RX ADMIN — Medication 3 MILLILITER(S): at 21:38

## 2021-12-10 RX ADMIN — SENNA PLUS 2 TABLET(S): 8.6 TABLET ORAL at 22:28

## 2021-12-10 RX ADMIN — HEPARIN SODIUM 5000 UNIT(S): 5000 INJECTION INTRAVENOUS; SUBCUTANEOUS at 05:52

## 2021-12-10 RX ADMIN — SODIUM CHLORIDE 3 MILLILITER(S): 9 INJECTION INTRAMUSCULAR; INTRAVENOUS; SUBCUTANEOUS at 06:29

## 2021-12-10 RX ADMIN — PANTOPRAZOLE SODIUM 40 MILLIGRAM(S): 20 TABLET, DELAYED RELEASE ORAL at 05:52

## 2021-12-10 RX ADMIN — Medication 0.5 MILLIGRAM(S): at 09:27

## 2021-12-10 RX ADMIN — SODIUM CHLORIDE 3 MILLILITER(S): 9 INJECTION INTRAMUSCULAR; INTRAVENOUS; SUBCUTANEOUS at 14:25

## 2021-12-10 RX ADMIN — Medication 3 MILLILITER(S): at 04:05

## 2021-12-10 RX ADMIN — HEPARIN SODIUM 5000 UNIT(S): 5000 INJECTION INTRAVENOUS; SUBCUTANEOUS at 18:40

## 2021-12-10 RX ADMIN — Medication 3 MILLILITER(S): at 15:51

## 2021-12-10 RX ADMIN — VALACYCLOVIR 500 MILLIGRAM(S): 500 TABLET, FILM COATED ORAL at 14:45

## 2021-12-10 RX ADMIN — SODIUM CHLORIDE 3 MILLILITER(S): 9 INJECTION INTRAMUSCULAR; INTRAVENOUS; SUBCUTANEOUS at 22:33

## 2021-12-10 RX ADMIN — Medication 650 MILLIGRAM(S): at 22:27

## 2021-12-10 RX ADMIN — Medication 1 SPRAY(S): at 23:59

## 2021-12-10 RX ADMIN — PIPERACILLIN AND TAZOBACTAM 25 GRAM(S): 4; .5 INJECTION, POWDER, LYOPHILIZED, FOR SOLUTION INTRAVENOUS at 14:48

## 2021-12-10 RX ADMIN — Medication 650 MILLIGRAM(S): at 23:30

## 2021-12-10 RX ADMIN — POLYETHYLENE GLYCOL 3350 17 GRAM(S): 17 POWDER, FOR SOLUTION ORAL at 14:45

## 2021-12-10 RX ADMIN — Medication 3 MILLILITER(S): at 09:27

## 2021-12-10 RX ADMIN — PIPERACILLIN AND TAZOBACTAM 25 GRAM(S): 4; .5 INJECTION, POWDER, LYOPHILIZED, FOR SOLUTION INTRAVENOUS at 22:28

## 2021-12-10 RX ADMIN — PIPERACILLIN AND TAZOBACTAM 25 GRAM(S): 4; .5 INJECTION, POWDER, LYOPHILIZED, FOR SOLUTION INTRAVENOUS at 07:01

## 2021-12-10 NOTE — DISCHARGE NOTE PROVIDER - NSDCHHATTENDCERT_GEN_ALL_CORE
[Negative] : Genitourinary
[Negative] : Genitourinary
My signature below certifies that the above stated patient is homebound and upon completion of the Face-To-Face encounter, has the need for intermittent skilled nursing, physical therapy and/or speech or occupational therapy services in their home for their current diagnosis as outlined in their initial plan of care. These services will continue to be monitored by myself or another physician.

## 2021-12-10 NOTE — PROGRESS NOTE ADULT - ASSESSMENT
65 yo M PMH B-cell lymphoma on R-CHOP (last dose ~2 weeks ago), OLEGARIO not on CPAP, GERD, and OA presented to ED w/ worsening chest pain found to have bilateral GGOs on CT scan    - CT reviewed, per radiology concern for possible underlying organizing pneumonia, though less likely given recent steroid use w/ chemo  - on empiric abx, would complete 7 day course, consider discharge on augmentin  - can dc azithro as urine legionella negative  - ddx includes possible drug induced pneumonitis vs. viral infectious  - c/w duonebs and ICS  - will need repeat CT in 4-6 weeks  - can follow up with pulmonary outpatient for CT monitoring and assess need for bronch w/ BAL/TBBx  Please email: [email or web address] to setup an appointment prior to discharge. Include the patient's name, , MRN and contact information in the email.      Pulmonary/Sleep Clinic  [address] [place] [address]  [phone]    Pulmonary to sign off.     Herbert Cordero MD  PGY-5  PCCM Fellow  Pager [phone] 65 yo M PMH B-cell lymphoma on R-CHOP (last dose ~2 weeks ago), OLEGARIO not on CPAP, GERD, and OA presented to ED w/ worsening chest pain found to have bilateral GGOs on CT scan    - CT reviewed, per radiology concern for possible underlying organizing pneumonia, though less likely given recent steroid use w/ chemo  - on empiric abx, would complete 7 day course, consider discharge on augmentin  - can dc azithro as urine legionella negative  - ddx includes possible drug induced pneumonitis vs. viral infectious  - c/w duonebs and ICS  - consider addition of flonase for possible UACS  - will need repeat CT in 4-6 weeks  - can follow up with pulmonary outpatient for CT monitoring and assess need for bronch w/ BAL/TBBx  Please email: [email or web address] to setup an appointment prior to discharge. Include the patient's name, , MRN and contact information in the email.      Pulmonary/Sleep Clinic  [address] [place] [address]  [phone]    Pulmonary to sign off.     Herbert Cordero MD  PGY-5  PCCM Fellow  Pager [phone]

## 2021-12-10 NOTE — PHYSICAL THERAPY INITIAL EVALUATION ADULT - GENERAL OBSERVATIONS, REHAB EVAL
Pt received semisupine in bed, +telemetry/pulse oximeter, +IV, in NAD. Pt agreeable to participate in PT evaluation.

## 2021-12-10 NOTE — PHYSICAL THERAPY INITIAL EVALUATION ADULT - PERTINENT HX OF CURRENT PROBLEM, REHAB EVAL
Pt is a 66 year old male presenting with CP and SOB. Pt admitted for organizing pneumonia. PMH: Lymphoma on R-CHOP(Last dose 2 weeks ago), OLEGARIO (no CPAP for approx. 15 years), GERD, Osteoarthritis.

## 2021-12-10 NOTE — PHYSICAL THERAPY INITIAL EVALUATION ADULT - ADDITIONAL COMMENTS
Pt lives in a house with 4 exterior steps to enter, + 1 flight of stairs within home. Prior to admission, pt ambulated independently, no assistive device.    Pt was left semisupine with head of bed elevated to 30°, all lines/tubes intact and call bell within reach, RN aware.

## 2021-12-10 NOTE — PHYSICAL THERAPY INITIAL EVALUATION ADULT - PATIENT PROFILE REVIEW, REHAB EVAL
PT orders received: ambulate with assistance. Consult with RN Candance/Gabrielle, patient may participate in PT evaluation./yes PT orders received: ambulate with assistance. Consult with RN Leora/Gabrielle, patient may participate in PT evaluation./yes

## 2021-12-10 NOTE — PHYSICAL THERAPY INITIAL EVALUATION ADULT - GROSSLY INTACT, SENSORY
Medication/Dose: Metoprolol 100 mg  Patient last seen by PCP: 10/2/2020  Next office visit with PCP: 4/6/2020  Last Lab:7/17/2020    Above information requires contacting patient: No    Prescription does not require PDMP check    Sent to provider to approve or deny      
Grossly Intact

## 2021-12-10 NOTE — PROGRESS NOTE ADULT - ATTENDING COMMENTS
66M PMH B-cell lymphoma s/p 5 cycles of R-CHOP (last in Nov 2021) who presents with pleuritic chest pain and non-productive cough with associated myalgias and generalized malaise for the past 2 weeks. He is hemodynamically stable and now with resolved fevers. No hypoxemia on room air with SpO2 98%. Reports nocturnal desaturation while sleeping, likely due to OLEGARIO - will need outpatient sleep study.    Labs with bandemia (16%) that resolved. Blood and urine cultures negative. Sputum culture with normal respiratory nicolasa. MRSA, urine legionella, and RVP negative. CT chest with peripheral lower lobe predominant ill-defined groundglass densities bilaterally, which can possibly represent organizing pneumonia vs. infection (e.g., viral or atypical bacterial).     Agree with discontinuing vancomycin. Complete 7 day course of Zosyn, although can discharge home with oral Augmentin to complete antibiotic course. It is possible that radiographic findings represent organizing pneumonia due to rituximab or cyclophosphamide. Would repeat CT chest in 4-6 weeks to assess for resolution - consider performing CT in prone position as opacities are very dependent and there may be a component of atelectasis. Hold off on bronchoscopy for now, but if symptoms persist or worsen or radiographic opacities persist, he may require bronchoscopy with BAL/TBBx as outpatient. Continue albuterol-ipratropium nebs q6h + budesonide nebs 0.5 mg q12h. Reports feeling symptomatically improved. Hold off on steroids at this time given symptomatic improvement. Will sign off, stable for discharge from pulmonary perspective. Discussed with patient at bedside.
66M PMH B-cell lymphoma s/p 5 cycles of R-CHOP (last in Nov 2021) who presents with pleuritic chest pain and non-productive cough with associated myalgias and generalized malaise for the past 2 weeks. He is hemodynamically stable, although spiked a fever of 100.8 overnight. No hypoxemia on room air with SpO2 98%. Labs with bandemia (16%) that resolved. Blood cultures and RVP negative. CT chest with peripheral lower lobe predominant ill-defined groundglass densities bilaterally, which can possibly represent organizing pneumonia vs. infection (e.g., viral or atypical bacterial).     Remains on empiric vancomycin and zosyn. D/c vancomycin if nasal MRSA negative. Would consider empiric therapy with azithromycin 500 mg IV daily. Follow-up urine legionella. It is possible that radiographic findings represent organizing pneumonia due to rituximab or cyclophosphamide. Would repeat CT chest in 4-6 weeks to assess for resolution - consider performing CT in prone position as opacities are very dependent and there may be a component of atelectasis. Hold off on bronchoscopy for now, but if symptoms persist or worsen or radiographic opacities persist, he may require bronchoscopy with BAL/TBBx. Continue albuterol-ipratropium nebs q6h + budesonide nebs 0.5 mg q12h. Reports feeling symptomatically improved. Hold off on steroids at this time pending infectious workup. Will continue to follow. Discussed with patient and family at bedside.

## 2021-12-10 NOTE — DISCHARGE NOTE NURSING/CASE MANAGEMENT/SOCIAL WORK - NSDCPEFALRISK_GEN_ALL_CORE
For information on Fall & Injury Prevention, visit: [email or web address] OR  [email or web address] OR  https://www.cdc.gov/steadi/patient.html

## 2021-12-10 NOTE — DISCHARGE NOTE PROVIDER - CARE PROVIDER_API CALL
Catalina Mayen)  Internal Medicine  [address] [facility], [address] [address]  Phone: [phone]  Fax: [phone]  Follow Up Time:     Kennedy Diamond)  Critical Care Medicine; Pulmonary Disease  [address] [facility], [address] [id number]  Phone: [phone]  Fax: [phone]  Follow Up Time:

## 2021-12-10 NOTE — DISCHARGE NOTE NURSING/CASE MANAGEMENT/SOCIAL WORK - PATIENT PORTAL LINK FT
You can access the FollowMyHealth Patient Portal offered by [facility] by registering at the following website: [email or web address]. By joining [facility]’s FollowMyHealth portal, you will also be able to view your health information using other applications (apps) compatible with our system.

## 2021-12-10 NOTE — DISCHARGE NOTE PROVIDER - NSDCCPCAREPLAN_GEN_ALL_CORE_FT
PRINCIPAL DISCHARGE DIAGNOSIS  Diagnosis: Pneumonia  Assessment and Plan of Treatment: Please follow up with your pcp within 1 week of discharge.  Please call to make an appointment within 1 week of discharge.  You were seen and evaluated by the pulmonary team while you were admitted to the hospital  -you had a catscan of your chest and it showed pneumonia  -please complete your antibiotics as prescribed  - will need repeat Catscan of your chest  in 4-6 weeks  - Please follow up with your pulmonologist on 12/20/2021 for repeat catscan and assess neeed for a bronchoscopy        SECONDARY DISCHARGE DIAGNOSES  Diagnosis: Dyspnea  Assessment and Plan of Treatment: Please follow up with your pcpc/pulmonologist within 1 week of dishcarge.  You were short of breath due to pneumonia  continue home oxygen.Continue 2liters nasal cannula continously.    resolved    Diagnosis: Elevated d-dimer  Assessment and Plan of Treatment: Please follow up with your pcp within 1 week of discharge.  Please call to make an appointmnt within 1 week of discharge.    -your catscan was negative for blood clots.    -your ultrasound of your lower extremities was negative for clots.        Diagnosis: Obstructive sleep apnea  Assessment and Plan of Treatment: Please follow up with your pulmonologist within 1 week of discharge. Please call to make an appointment within 1 week of discharge.     contineu 2liters nasal cannula continously      Diagnosis: Bandemia  Assessment and Plan of Treatment: Please folllow up with your pcp within 1 week of discharge.  Please call to make an appointment within 1 week of discharge.    Bandemia possible in setting of chemotherapy agent.  You were seen and evaluated by oncology while you were admitted to the hospital.  Continue to monitor your complete blood count within 1 week of discharge.   Please follow up with Dr. Mayen upon discharge       Diagnosis: LOLY (acute kidney injury)  Assessment and Plan of Treatment: please follow up with your pcp within 1 week of discharge. Please call to make an appointment within 1 week of dishcarge.    -resolved    Diagnosis: Chest pain  Assessment and Plan of Treatment: Please follow up with your pcp and cardiologist within 1 week of discharge.  Please call to make an appointment within 1 week of discharge.  Your chest pain has resolved.  Please follow up wiht your cardiolgoist for further work up.  If you do not have a cardiologist please follow up with cardiology at [facility] [phone].    You had a catscan of your chest which was negative for clots of your lungs  your cardiac enzymes and ekg did not show signs of acute coronary syndrome  -resolved       Diagnosis: Anemia  Assessment and Plan of Treatment: Please follow up with Dr. Mayen within 1 week of discharge. Please call to make an appointment within 1 week of discharge.    Likely in setting of chemotherapy.  Continue to monitor your complete blood count within 1 week of discharge.  Follow-up with your outpatient provider for further care/recommendations. Monitor for signs/symptoms indicating worsening of disease, such as, easy bleeding/bruising, pale skin, fatigue, dizziness, increased heart rate, or chest pain.    Diagnosis: Lymphoma  Assessment and Plan of Treatment: Please follow up with Dr. Mayen Oncologist within 1 week of discharge.  Please call to make an appointment within 1 week of discharge.         PRINCIPAL DISCHARGE DIAGNOSIS  Diagnosis: Pneumonia  Assessment and Plan of Treatment: Please follow up with your pcp within 1 week of discharge.  Please call to make an appointment within 1 week of discharge.  You were seen and evaluated by the pulmonary team while you were admitted to the hospital  -you had a catscan of your chest and it showed pneumonia  -please complete your antibiotics as prescribed  - will need repeat Catscan of your chest  in 4-6 weeks  - Please follow up with your pulmonologist on 12/20/2021 for repeat catscan and assess neeed for a bronchoscopy        SECONDARY DISCHARGE DIAGNOSES  Diagnosis: Chest pain  Assessment and Plan of Treatment: Please follow up with your pcp and cardiologist within 1 week of discharge.  Please call to make an appointment within 1 week of discharge.  Your chest pain has resolved.  Please follow up wiht your cardiolgoist for further work up.  If you do not have a cardiologist please follow up with cardiology at [facility] [phone].    You had a catscan of your chest which was negative for clots of your lungs  your cardiac enzymes and ekg did not show signs of acute coronary syndrome  -resolved       Diagnosis: Dyspnea  Assessment and Plan of Treatment: Please follow up with your pcpc/pulmonologist within 1 week of dishcarge.  You were short of breath due to pneumonia  continue home oxygen.Continue 2liters nasal cannula continously.    resolved    Diagnosis: Bandemia  Assessment and Plan of Treatment: Please folllow up with your pcp within 1 week of discharge.  Please call to make an appointment within 1 week of discharge.    Bandemia possible in setting of chemotherapy agent.  You were seen and evaluated by oncology while you were admitted to the hospital.  Continue to monitor your complete blood count within 1 week of discharge.   Please follow up with Dr. Mayen upon discharge       Diagnosis: Lymphoma  Assessment and Plan of Treatment: Please follow up with Dr. Mayen Oncologist within 1 week of discharge.  Please call to make an appointment within 1 week of discharge.        Diagnosis: Elevated d-dimer  Assessment and Plan of Treatment: Please follow up with your pcp within 1 week of discharge.  Please call to make an appointmnt within 1 week of discharge.    -your catscan was negative for blood clots.    -your ultrasound of your lower extremities was negative for clots.        Diagnosis: Obstructive sleep apnea  Assessment and Plan of Treatment: Please follow up with your pulmonologist within 1 week of discharge. Please call to make an appointment within 1 week of discharge.     contineu 2liters nasal cannula continously      Diagnosis: Anemia  Assessment and Plan of Treatment: Please follow up with Dr. Mayen within 1 week of discharge. Please call to make an appointment within 1 week of discharge.    Likely in setting of chemotherapy.  Continue to monitor your complete blood count within 1 week of discharge.  Follow-up with your outpatient provider for further care/recommendations. Monitor for signs/symptoms indicating worsening of disease, such as, easy bleeding/bruising, pale skin, fatigue, dizziness, increased heart rate, or chest pain.    Diagnosis: LOLY (acute kidney injury)  Assessment and Plan of Treatment: please follow up with your pcp within 1 week of discharge. Please call to make an appointment within 1 week of dishcarge.    -resolved

## 2021-12-10 NOTE — DISCHARGE NOTE PROVIDER - NSDCMRMEDTOKEN_GEN_ALL_CORE_FT
ondansetron 8 mg oral tablet: 1 tab(s) orally 3 times a day, As Needed  Oxygen 2Liters via nasal cannula continous portable and concentrator SERGIO 99:   valACYclovir 500 mg oral tablet: 1 tab(s) orally once a day   acetaminophen 325 mg oral tablet: 2 tab(s) orally every 6 hours, As needed, Temp greater or equal to 38C (100.4F), Mild Pain (1 - 3)  amoxicillin-clavulanate 875 mg-125 mg oral tablet: 1 tab(s) orally 2 times a day  budesonide: 0.5 milligram(s) by metered dose inhaler 2 times a day   fluticasone 50 mcg/inh nasal spray: 1 spray(s) nasal 2 times a day  ondansetron 8 mg oral tablet: 1 tab(s) orally 3 times a day, As Needed  Oxygen 2Liters via nasal cannula continous portable and concentrator SERGIO 99:   pantoprazole 40 mg oral delayed release tablet: 1 tab(s) orally once a day (before a meal)  valACYclovir 500 mg oral tablet: 1 tab(s) orally once a day   acetaminophen 325 mg oral tablet: 2 tab(s) orally every 6 hours, As needed, Temp greater or equal to 38C (100.4F), Mild Pain (1 - 3)  amoxicillin-clavulanate 875 mg-125 mg oral tablet: 1 tab(s) orally 2 times a day  budesonide 90 mcg/inh inhalation powder: 2 puff(s) inhaled 2 times a day for 14 days  fluticasone 50 mcg/inh nasal spray: 1 spray(s) nasal 2 times a day  ondansetron 8 mg oral tablet: 1 tab(s) orally 3 times a day, As Needed  Oxygen 2Liters via nasal cannula continous portable and concentrator SERGIO 99:   pantoprazole 40 mg oral delayed release tablet: 1 tab(s) orally once a day (before a meal)  valACYclovir 500 mg oral tablet: 1 tab(s) orally once a day   acetaminophen 325 mg oral tablet: 2 tab(s) orally every 6 hours, As needed, Temp greater or equal to 38C (100.4F), Mild Pain (1 - 3)  amoxicillin-clavulanate 875 mg-125 mg oral tablet: 1 tab(s) orally 2 times a day  Asmanex HFA 50 mcg/inh inhalation aerosol: 2 puff(s) inhaled 2 times a day   fluticasone 50 mcg/inh nasal spray: 1 spray(s) nasal 2 times a day  ondansetron 8 mg oral tablet: 1 tab(s) orally 3 times a day, As Needed  pantoprazole 40 mg oral delayed release tablet: 1 tab(s) orally once a day (before a meal)  valACYclovir 500 mg oral tablet: 1 tab(s) orally once a day   acetaminophen 325 mg oral tablet: 2 tab(s) orally every 6 hours, As needed, Temp greater or equal to 38C (100.4F), Mild Pain (1 - 3)  amoxicillin-clavulanate 875 mg-125 mg oral tablet: 1 tab(s) orally 2 times a day  Asmanex  mcg/inh inhalation aerosol: 1  inhaled 2 times a day   fluticasone 50 mcg/inh nasal spray: 1 spray(s) nasal 2 times a day  ondansetron 8 mg oral tablet: 1 tab(s) orally 3 times a day, As Needed  Oxygen 2Liters via nasal cannula continous portable and concentrator SERGIO 99:   pantoprazole 40 mg oral delayed release tablet: 1 tab(s) orally once a day (before a meal)  valACYclovir 500 mg oral tablet: 1 tab(s) orally once a day

## 2021-12-10 NOTE — CHART NOTE - NSCHARTNOTEFT_GEN_A_CORE
67 y/o F with PMH of Lymphoma on R-CHOP(Last dose 2 weeks ago), OLEGARIO ( no CPAP for approx. 15 years), GERD, Osteoarthritis presents to the ED with complaint of chest pain and shortness of breath. Patient admitted for organizing pneumonia. O2 sat on RA 86 % at rest. Placed 2liters nasal cannula increased to 97% 67 y/o F with PMH of Lymphoma on R-CHOP(Last dose 2 weeks ago), OLEGARIO ( no CPAP for approx. 15 years), GERD, Osteoarthritis presents to the ED with complaint of chest pain and shortness of breath. Patient admitted for organizing pneumonia. O2 sat on RA 88% at rest. Placed 2liters nasal cannula increased to 97% 67 y/o F with PMH of Lymphoma on R-CHOP(Last dose 2 weeks ago), OLEGARIO ( no CPAP for approx. 15 years), GERD, Osteoarthritis presents to the ED with complaint of chest pain and shortness of breath. Patient with diagnosis of Lymphoma.  O2 sat on RA 88% at rest. Placed 2liters nasal cannula increased to 97%

## 2021-12-10 NOTE — DISCHARGE NOTE PROVIDER - NSDCFUADDAPPT_GEN_ALL_CORE_FT
Please call and schedule outpatient follow up with Pulmonologist Dr Gonzales in 1-2 weeks following discharge as you will need repeat CT chest in 4-6 weeks, and possibly a Bronchoscopy

## 2021-12-10 NOTE — DISCHARGE NOTE PROVIDER - HOSPITAL COURSE
67 y/o F with PMH of Lymphoma on R-CHOP(Last dose 2 weeks ago), OLEGARIO ( no CPAP for approx. 15 years), GERD, Osteoarthritis presents to the ED with complaint of chest pain and shortness of breath. Patient admitted for organizing pneumonia.      Pneumonia.   pulm following   -- CT reviewed, per radiology concern for possible underlying organizing pneumonia, though less likely given recent steroid use w/ chemo  - on empiric abx, would complete 7 day course, consider discharge on augmentin  - can dc azithro as urine legionella negative  - ddx includes possible drug induced pneumonitis vs. viral infectious  - c/w duonebs and ICS  - consider addition of flonase for possible UACS  - will need repeat CT in 4-6 weeks  - can follow up with pulmonary outpatient for CT monitoring and assess need for bronch w/ BAL/TBBx  .      Chest pain.   Patient reports chest pain with exertion.  Patient had cardiac cath in 2010 that showed 60% LAD stenosis.  Patient reports that he has not seen a Cardiologist in 5 years.  CTA negative for PE.  Troponin 33, repeat ordered.  -resolved     Dyspnea.   Patient reporting dyspnea when taking deep breaths and on exertion.  CTA negative for PE.  Echo ordered, continue monitoring.  Patient not on CPAP. Blood gas pH and HCO3 within normal limit  continue home oxygen   resolved     Bandemia.   Patient with Neutrophil bandemia of 16.  Patient has normal WBC count of 7.49.  Continue to trend, resolving  Patient afebrile.  Bandemia possible in setting of chemotherapy agent.  Hem- oncology follow up.    Elevated d-dimer.   Patient with D-dimer of 321.  CTA negative for PE.  Patient denies pain in lower extremities.  Given history of Lymphoma.  VA duplex of B/L lower extremities,negative for DVT.    Lymphoma.   Patient follows with Dr. Mayen ([facility]).   ED provider reached out to Dr. Mayen regarding 16% band and was told that it was likely secondary to Nulasta shot that he received. since WBC was normal no concern.  Reach out to Dr. Mayen , consult appreciated.    Anemia.   Patient with hemoglobin of 12.5. Baseline approximately 14.  Likely in setting of chronic disease and chemotherapy.  Continue to trend.    Obstructive sleep apnea.   Patient reports that he has not used CPAP in approximately 15 years.  Continue monitoring.  continue oxygen     LOLY (acute kidney injury).   -poss sec to Vanco: vanco dc'd   -resolved     DVT PPX   -heparin sq     On_________, discussed with __________, patient is medically cleared and optimized for discharge today. All medications were reviewed with attending, and sent to mutually agreed upon pharmacy. 65 y/o F with PMH of Lymphoma on R-CHOP(Last dose 2 weeks ago), OLEGARIO ( no CPAP for approx. 15 years), GERD, Osteoarthritis presents to the ED with complaint of chest pain and shortness of breath. Patient admitted for organizing pneumonia.    Pneumonia.   pulm following   -- CT reviewed, per radiology concern for possible underlying organizing pneumonia, though less likely given recent steroid use w/ chemo  - on empiric abx, would complete 7 day course, consider discharge on augmentin  - can dc azithro as urine legionella negative  - ddx includes possible drug induced pneumonitis vs. viral infectious  - c/w duonebs and ICS  - consider addition of flonase for possible UACS  - will need repeat CT in 4-6 weeks  - can follow up with pulmonary outpatient for CT monitoring and assess need for bronch w/ BAL/TBBx  .    Chest pain.   Patient reports chest pain with exertion.  Patient had cardiac cath in 2010 that showed 60% LAD stenosis.  Patient reports that he has not seen a Cardiologist in 5 years.  CTA negative for PE.  Troponin 33, repeat ordered.  -resolved     Dyspnea.   Patient reporting dyspnea when taking deep breaths and on exertion.  CTA negative for PE.  Echo ordered, continue monitoring.  Patient not on CPAP. Blood gas pH and HCO3 within normal limit  continue home oxygen   resolved     Bandemia.   Patient with Neutrophil bandemia of 16.  Patient has normal WBC count of 7.49.  Continue to trend, resolving  Patient afebrile.  Bandemia possible in setting of chemotherapy agent.  Hem- oncology follow up.    Elevated d-dimer.   Patient with D-dimer of 321.  CTA negative for PE.  Patient denies pain in lower extremities.  Given history of Lymphoma.  VA duplex of B/L lower extremities,negative for DVT.    Lymphoma.   Patient follows with Dr. Mayen ([facility]).   ED provider reached out to Dr. Mayen regarding 16% band and was told that it was likely secondary to Nulasta shot that he received. since WBC was normal no concern.  Reach out to Dr. Mayen , consult appreciated.    Anemia.   Patient with hemoglobin of 12.5. Baseline approximately 14.  Likely in setting of chronic disease and chemotherapy.  Continue to trend.    Obstructive sleep apnea.   Patient reports that he has not used CPAP in approximately 15 years.  Continue monitoring.  continue oxygen     LOLY (acute kidney injury).   -poss sec to Vanco: vanco dc'd   -resolved     DVT PPX   -heparin sq     On_________, discussed with __________, patient is medically cleared and optimized for discharge today. All medications were reviewed with attending, and sent to mutually agreed upon pharmacy. 67 y/o F with PMH of Lymphoma on R-CHOP(Last dose 2 weeks ago), OLEGARIO ( no CPAP for approx. 15 years), GERD, Osteoarthritis presents to the ED with complaint of chest pain and shortness of breath. Patient admitted for organizing pneumonia.    Pneumonia.   pulm following   -- CT reviewed, per radiology concern for possible underlying organizing pneumonia, though less likely given recent steroid use w/ chemo  - on empiric abx, would complete 7 day course, consider discharge on augmentin  - can dc azithro as urine legionella negative  - ddx includes possible drug induced pneumonitis vs. viral infectious  - c/w duonebs and ICS  - consider addition of flonase for possible UACS  - will need repeat CT in 4-6 weeks  - can follow up with pulmonary outpatient for CT monitoring and assess need for bronch w/ BAL/TBBx  .    Chest pain.   Patient reports chest pain with exertion.  Patient had cardiac cath in 2010 that showed 60% LAD stenosis.  Patient reports that he has not seen a Cardiologist in 5 years.  CTA negative for PE.  Troponin 33, repeat ordered.  -resolved     Dyspnea.   Patient reporting dyspnea when taking deep breaths and on exertion.  CTA negative for PE.  Echo ordered, continue monitoring.  Patient not on CPAP. Blood gas pH and HCO3 within normal limit  continue home oxygen   resolved     Bandemia.   Patient with Neutrophil bandemia of 16.  Patient has normal WBC count of 7.49.  Continue to trend, resolving  Patient afebrile.  Bandemia possible in setting of chemotherapy agent.  Hem- oncology follow up.    Elevated d-dimer.   Patient with D-dimer of 321.  CTA negative for PE.  Patient denies pain in lower extremities.  Given history of Lymphoma.  VA duplex of B/L lower extremities,negative for DVT.    Lymphoma.   Patient follows with Dr. Mayen ([facility]).   ED provider reached out to Dr. Mayen regarding 16% band and was told that it was likely secondary to Nulasta shot that he received. since WBC was normal no concern.  Reach out to Dr. Mayen , consult appreciated.    Anemia.   Patient with hemoglobin of 12.5. Baseline approximately 14.  Likely in setting of chronic disease and chemotherapy.  Continue to trend.    Obstructive sleep apnea.   Patient reports that he has not used CPAP in approximately 15 years.  Continue monitoring.  continue oxygen     LOLY (acute kidney injury).   -poss sec to Vanco: vanco dc'd   -resolved     DVT PPX   -heparin sq     On 12/11/21, discussed with Dr. Turcios, patient is medically cleared and optimized for discharge today with appropriate follow up . All medications were reviewed with attending, and sent to mutually agreed upon pharmacy. Patient and family understand and agree with plan of care.

## 2021-12-10 NOTE — PROGRESS NOTE ADULT - ASSESSMENT
65 y/o F with PMH of Lymphoma on R-CHOP(Last dose 2 weeks ago), OLEAGRIO ( no CPAP for approx. 15 years), GERD, Osteoarthritis presents to the ED with complaint of chest pain and shortness of breath. Patient admitted for organizing pneumonia.

## 2021-12-11 VITALS
TEMPERATURE: 99 F | RESPIRATION RATE: 18 BRPM | OXYGEN SATURATION: 100 % | SYSTOLIC BLOOD PRESSURE: 115 MMHG | DIASTOLIC BLOOD PRESSURE: 72 MMHG | HEART RATE: 95 BPM

## 2021-12-11 LAB
CULTURE RESULTS: SIGNIFICANT CHANGE UP
CULTURE RESULTS: SIGNIFICANT CHANGE UP
SPECIMEN SOURCE: SIGNIFICANT CHANGE UP
SPECIMEN SOURCE: SIGNIFICANT CHANGE UP

## 2021-12-11 PROCEDURE — 99239 HOSP IP/OBS DSCHRG MGMT >30: CPT

## 2021-12-11 RX ORDER — BUDESONIDE, MICRONIZED 100 %
0.5 POWDER (GRAM) MISCELLANEOUS
Qty: 1 | Refills: 0
Start: 2021-12-11 | End: 2022-01-09

## 2021-12-11 RX ORDER — PANTOPRAZOLE SODIUM 20 MG/1
1 TABLET, DELAYED RELEASE ORAL
Qty: 30 | Refills: 0
Start: 2021-12-11 | End: 2022-01-09

## 2021-12-11 RX ORDER — MOMETASONE FUROATE 220 UG/1
1 INHALANT RESPIRATORY (INHALATION)
Qty: 1 | Refills: 0
Start: 2021-12-11 | End: 2021-12-24

## 2021-12-11 RX ORDER — ACETAMINOPHEN 500 MG
2 TABLET ORAL
Qty: 0 | Refills: 0 | DISCHARGE
Start: 2021-12-11

## 2021-12-11 RX ORDER — MOMETASONE FUROATE 220 UG/1
2 INHALANT RESPIRATORY (INHALATION)
Qty: 1 | Refills: 0
Start: 2021-12-11 | End: 2021-12-24

## 2021-12-11 RX ORDER — BUDESONIDE, MICRONIZED 100 %
2 POWDER (GRAM) MISCELLANEOUS
Qty: 1 | Refills: 0
Start: 2021-12-11 | End: 2021-12-24

## 2021-12-11 RX ORDER — FLUTICASONE PROPIONATE 50 MCG
1 SPRAY, SUSPENSION NASAL
Qty: 1 | Refills: 0
Start: 2021-12-11 | End: 2022-01-09

## 2021-12-11 RX ADMIN — PIPERACILLIN AND TAZOBACTAM 25 GRAM(S): 4; .5 INJECTION, POWDER, LYOPHILIZED, FOR SOLUTION INTRAVENOUS at 06:30

## 2021-12-11 RX ADMIN — HEPARIN SODIUM 5000 UNIT(S): 5000 INJECTION INTRAVENOUS; SUBCUTANEOUS at 06:32

## 2021-12-11 RX ADMIN — Medication 1 SPRAY(S): at 06:31

## 2021-12-11 RX ADMIN — SODIUM CHLORIDE 3 MILLILITER(S): 9 INJECTION INTRAMUSCULAR; INTRAVENOUS; SUBCUTANEOUS at 13:34

## 2021-12-11 RX ADMIN — POLYETHYLENE GLYCOL 3350 17 GRAM(S): 17 POWDER, FOR SOLUTION ORAL at 12:28

## 2021-12-11 RX ADMIN — VALACYCLOVIR 500 MILLIGRAM(S): 500 TABLET, FILM COATED ORAL at 12:27

## 2021-12-11 RX ADMIN — SODIUM CHLORIDE 3 MILLILITER(S): 9 INJECTION INTRAMUSCULAR; INTRAVENOUS; SUBCUTANEOUS at 06:38

## 2021-12-11 RX ADMIN — Medication 3 MILLILITER(S): at 15:20

## 2021-12-11 RX ADMIN — Medication 3 MILLILITER(S): at 10:23

## 2021-12-11 RX ADMIN — PANTOPRAZOLE SODIUM 40 MILLIGRAM(S): 20 TABLET, DELAYED RELEASE ORAL at 06:31

## 2021-12-11 RX ADMIN — Medication 0.5 MILLIGRAM(S): at 10:30

## 2021-12-11 NOTE — PROGRESS NOTE ADULT - PROBLEM SELECTOR PLAN 8
Patient reports that he has not used CPAP in approximately 15 years.  Continue monitoring.
Patient reports that he has not used CPAP in approximately 15 years.  Continue monitoring.  check home O2 needs
Patient reports that he has not used CPAP in approximately 15 years.  Continue monitoring.  check home O2 needs
Patient reports that he has not used CPAP in approximately 15 years.  Continue monitoring.  home O2 needs
Patient reports that he has not used CPAP in approximately 15 years.  Continue monitoring.

## 2021-12-11 NOTE — PROGRESS NOTE ADULT - PROBLEM SELECTOR PLAN 3
Patient reporting dyspnea when taking deep breaths and on exertion.  CTA negative for PE.  Echo ordered, continue monitoring.  Patient not on CPAP. Blood gas pH and HCO3 within normal limits.
Patient reporting dyspnea when taking deep breaths and on exertion. Needs home O2  CTA negative for PE.  Echo as above   Patient not on CPAP. Blood gas pH and HCO3 within normal limits.  Eval for home O2 as above
Patient reporting dyspnea when taking deep breaths and on exertion.  CTA negative for PE.  Echo ordered, continue monitoring.  Patient not on CPAP. Blood gas pH and HCO3 within normal limits.
Patient reporting dyspnea when taking deep breaths and on exertion. Needs home O2  CTA negative for PE.  Echo as above   Patient not on CPAP. Blood gas pH and HCO3 within normal limits.  Sleep study outpatint , now resolved , DC home with oxygen
Patient reporting dyspnea when taking deep breaths and on exertion.  CTA negative for PE.  Echo ordered, continue monitoring.  Patient not on CPAP. Blood gas pH and HCO3 within normal limits.  Eval for home O2

## 2021-12-11 NOTE — PROGRESS NOTE ADULT - REASON FOR ADMISSION
Chest pain and shortness of breath.

## 2021-12-11 NOTE — PROGRESS NOTE ADULT - PROBLEM SELECTOR PLAN 4
Patient with Neutrophil bandemia of 16.  Patient has normal WBC count of 7.49.  Continue to trend, resolving  Patient afebrile.  Bandemia possible in setting of chemotherapy agent.  Hem- oncology follow up

## 2021-12-11 NOTE — PROGRESS NOTE ADULT - PROBLEM SELECTOR PLAN 2
Patient reports chest pain with exertion.  Patient had cardiac cath in 2010 that showed 60% LAD stenosis.  Patient reports that he has not seen a Cardiologist in 5 years.  CTA negative for PE.  Troponin 33, repeat ordered.  Echo ordered.
Patient reports chest pain with exertion.  Patient had cardiac cath in 2010 that showed 60% LAD stenosis.  Patient reports that he has not seen a Cardiologist in 5 years.  CTA negative for PE.  Troponin 33, repeat ordered.  Echo as above
Patient reports chest pain with exertion.  Patient had cardiac cath in 2010 that showed 60% LAD stenosis.  Patient reports that he has not seen a Cardiologist in 5 years.  CTA negative for PE.  Troponin 33, repeat ordered.  Echo as above
Patient reports chest pain with exertion.  Patient had cardiac cath in 2010 that showed 60% LAD stenosis.  Patient reports that he has not seen a Cardiologist in 5 years.  CTA negative for PE.  Troponin 33, repeat ordered.  Echo ordered.
Patient reports chest pain with exertion.  Patient had cardiac cath in 2010 that showed 60% LAD stenosis.  Patient reports that he has not seen a Cardiologist in 5 years.  CTA negative for PE.  Troponin 33, repeat ordered.  Echo as above

## 2021-12-11 NOTE — PROGRESS NOTE ADULT - PROBLEM SELECTOR PLAN 6
Patient follows with Dr. Mayen ([facility]).   ED provider reached out to Dr. Mayen regarding 16% band and was told that it was likely secondary to Nulasta shot that he received. since WBC was normal no concern.  Reach out to Dr. Mayen , consult appreciated
Patient follows with Dr. Mayen ([facility]).   ED provider reached out to Dr. Mayen regarding 16% band and was told that it was likely secondary to Nulasta shot that he received. since WBC was normal no concern.  Reach out to Dr. Mayen in AM.
Patient follows with Dr. Mayen ([facility]).   ED provider reached out to Dr. Mayen regarding 16% band and was told that it was likely secondary to Nulasta shot that he received. since WBC was normal no concern.  Reach out to Dr. Mayen , consult appreciated
Patient follows with Dr. Mayen ([facility]).   ED provider reached out to Dr. Mayen regarding 16% band and was told that it was likely secondary to Nulasta shot that he received. since WBC was normal no concern.  Reach out to Dr. Mayen , consult appreciated
Patient follows with Dr. Mayen ([facility]).   ED provider reached out to Dr. Mayen regarding 16% band and was told that it was likely secondary to Nulasta shot that he received. since WBC was normal no concern.  Reach out to Dr. Mayen , consult appreciated

## 2021-12-11 NOTE — PROGRESS NOTE ADULT - PROBLEM SELECTOR PLAN 1
CTA showing there is peripheral lower lobe predominant ill-defined ground glass densities in both lungs. Lung findings favored to represent organizing pneumonia, commonly seen in the setting of pulmonary drug toxicity. Infection is also in the differential.  Possibly in setting of Nulasta vs infectious etiology.  RVP and COVID 19 PCR negative.  Patient s/p Azithromycin 500 mg, Zosyn 3.375g and s/p Vancomycin 1g in ED.  Will DC  vancomycin and c/w  zosyn for now. DC David as legionella negative    Pulmonary consult for "organizing pneumonia" appreciated , f/u recommendations    OLEGARIO- need eval for home O2  SW for home O2 , can be DC home on Augmentin to complete 7 days course
CTA showing there is peripheral lower lobe predominant ill-defined ground glass densities in both lungs. Lung findings favored to represent organizing pneumonia, commonly seen in the setting of pulmonary drug toxicity. Infection is also in the differential.  Possibly in setting of Nulasta vs infectious etiology.  RVP and COVID 19 PCR negative.  Patient s/p Azithromycin 500 mg, Zosyn 3.375g and s/p Vancomycin 1g in ED.  Will DC  vancomycin and c/w  zosyn for now. DC David as legionella negative    Pulmonary consult for "organizing pneumonia" appreciated , f/u recommendations    OLEGARIO- need  home O2, delivered   SW for home O2 , can be DC home on Augmentin to complete 7 days course
CTA showing there is peripheral lower lobe predominant ill-defined groundglass densities in both lungs. Lung findings favored to represent organizing pneumonia, commonly seen in the setting of pulmonary drug toxicity. Infection is also in the differential.  Possibly in setting of Nulasta vs infectious etiology.  RVP and COVID 19 PCR negative.  Patient s/p Azithromycin 500 mg, Zosyn 3.375g and Vancomycin 1g in ED.  Will continue vancomycin and zosyn for now.   Pulmonary consult for "organizing pneumonia" appreciated , f/u recommendations
CTA showing there is peripheral lower lobe predominant ill-defined groundglass densities in both lungs. Lung findings favored to represent organizing pneumonia, commonly seen in the setting of pulmonary drug toxicity. Infection is also in the differential.  Possibly in setting of Nulasta vs infectious etiology.  RVP and COVID 19 PCR negative.  Patient s/p Azithromycin 500 mg, Zosyn 3.375g and Vancomycin 1g in ED.  Will continue vancomycin and zosyn for now.   Pulmonary consult for "organizing pneumonia"
CTA showing there is peripheral lower lobe predominant ill-defined groundglass densities in both lungs. Lung findings favored to represent organizing pneumonia, commonly seen in the setting of pulmonary drug toxicity. Infection is also in the differential.  Possibly in setting of Nulasta vs infectious etiology.  RVP and COVID 19 PCR negative.  Patient s/p Azithromycin 500 mg, Zosyn 3.375g and Vancomycin 1g in ED.  Will continue vancomycin and zosyn for now.BRI Hernandez as legionella negative    Pulmonary consult for "organizing pneumonia" appreciated , f/u recommendations    OLEGARIO- need eval for home O2

## 2021-12-11 NOTE — PROGRESS NOTE ADULT - PROBLEM SELECTOR PLAN 10
poss sec to Vanco, now resolved   DC'd as MRSA negative
poss sec to Vanco  DC if MRSA negative  Check UA , sediment , protein/cretinine ratio, monitor BMP  If worsening stop vanco
poss sec to Vanco, now resolved   DC'd as MRSA negative

## 2021-12-11 NOTE — PROGRESS NOTE ADULT - PROBLEM SELECTOR PLAN 5
Patient with D-dimer of 321.  CTA negative for PE.  Patient denies pain in lower extremities.  Given history of Lymphoma.  Will order [facility] duplex of B/L lower extremities,negative for DVT
Patient with D-dimer of 321.  CTA negative for PE.  Patient denies pain in lower extremities.  Given history of Lymphoma.  Will order VA duplex of B/L lower extremities.
Patient with D-dimer of 321.  CTA negative for PE.  Patient denies pain in lower extremities.  Given history of Lymphoma.  Will order VA duplex of B/L lower extremities, negative for DVT
Patient with D-dimer of 321.  CTA negative for PE.  Patient denies pain in lower extremities.  Given history of Lymphoma.  Will order VA duplex of B/L lower extremities, negative for DVT
Patient with D-dimer of 321.  CTA negative for PE.  Patient denies pain in lower extremities.  Given history of Lymphoma.  Will order VA duplex of B/L lower extremities, pending

## 2021-12-11 NOTE — PROGRESS NOTE ADULT - NSPROGADDITIONALINFOA_GEN_ALL_CORE
DC planning
Patient is stable for DC , d/w daughter on face time in detail   Time spent coordinating DC 35 min
d/w patient and friend at bed time
d/w with daughter in detail at bedside, spent 35 min

## 2021-12-11 NOTE — PROGRESS NOTE ADULT - PROBLEM SELECTOR PLAN 9
Heparin subcutaneous 5000 units q12h.

## 2021-12-11 NOTE — PROGRESS NOTE ADULT - PROBLEM SELECTOR PROBLEM 8
Obstructive sleep apnea

## 2021-12-11 NOTE — PROGRESS NOTE ADULT - SUBJECTIVE AND OBJECTIVE BOX
CHIEF COMPLAINT: SOB, cough    Interval Events:  Feels well today though reports some nasal congestion and throat discomfort w/ cough    REVIEW OF SYSTEMS:  Constitutional: [ ] negative [ ] fevers [ ] chills [ ] weight loss [ ] weight gain  HEENT: [ ] negative [ ] dry eyes [ ] eye irritation [ ] postnasal drip [ ] nasal congestion  CV: [ ] negative  [ ] chest pain [ ] orthopnea [ ] palpitations [ ] murmur  Resp: [ ] negative [x] cough [ ] shortness of breath [ ] dyspnea [ ] wheezing [ ] sputum [ ] hemoptysis  GI: [ ] negative [ ] nausea [ ] vomiting [ ] diarrhea [ ] constipation [ ] abd pain [ ] dysphagia   : [ ] negative [ ] dysuria [ ] nocturia [ ] hematuria [ ] increased urinary frequency  Musculoskeletal: [ ] negative [ ] back pain [ ] myalgias [ ] arthralgias [ ] fracture  Skin: [ ] negative [ ] rash [ ] itch  Neurological: [ ] negative [ ] headache [ ] dizziness [ ] syncope [ ] weakness [ ] numbness  Psychiatric: [ ] negative [ ] anxiety [ ] depression  Endocrine: [ ] negative [ ] diabetes [ ] thyroid problem  Hematologic/Lymphatic: [ ] negative [ ] anemia [ ] bleeding problem  Allergic/Immunologic: [ ] negative [ ] itchy eyes [ ] nasal discharge [ ] hives [ ] angioedema  [x] All other systems negative  [ ] Unable to assess ROS because ________    OBJECTIVE:  ICU Vital Signs Last 24 Hrs  T(C): 36.5 (10 Dec 2021 11:14), Max: 37.7 (09 Dec 2021 21:54)  T(F): 97.7 (10 Dec 2021 11:14), Max: 99.9 (09 Dec 2021 21:54)  HR: 86 (10 Dec 2021 15:51) (78 - 92)  BP: 124/77 (10 Dec 2021 11:14) (121/77 - 142/76)  BP(mean): --  ABP: --  ABP(mean): --  RR: 18 (10 Dec 2021 13:00) (17 - 18)  SpO2: 96% (10 Dec 2021 15:51) (88% - 100%)        CAPILLARY BLOOD GLUCOSE          PHYSICAL EXAM:  General: Male, NAD  HEENT: EOMI  Respiratory: Clear  Cardiovascular: RR no mrg  Abdomen: Soft  Extremities: WWP  Skin: Intact   Neurological: AOx3  Psychiatry: Normal    HOSPITAL MEDICATIONS:  MEDICATIONS  (STANDING):  albuterol/ipratropium for Nebulization 3 milliLiter(s) Nebulizer every 6 hours  buDESOnide    Inhalation Suspension 0.5 milliGRAM(s) Inhalation every 12 hours  heparin   Injectable 5000 Unit(s) SubCutaneous every 12 hours  influenza  Vaccine (HIGH DOSE) 0.7 milliLiter(s) IntraMuscular once  pantoprazole    Tablet 40 milliGRAM(s) Oral before breakfast  piperacillin/tazobactam IVPB.. 3.375 Gram(s) IV Intermittent every 8 hours  polyethylene glycol 3350 17 Gram(s) Oral daily  senna 2 Tablet(s) Oral at bedtime  sodium chloride 0.9% lock flush 3 milliLiter(s) IV Push every 8 hours  valACYclovir      valACYclovir 500 milliGRAM(s) Oral daily    MEDICATIONS  (PRN):  acetaminophen     Tablet .. 650 milliGRAM(s) Oral every 6 hours PRN Temp greater or equal to 38C (100.4F), Mild Pain (1 - 3)      LABS:                        11.4   8.55  )-----------( 423      ( 10 Dec 2021 07:27 )             35.9     Hgb Trend: 11.4<--, 11.5<--, 12.2<--, 11.8<--, 12.5<--  12-10    135  |  102  |  11  ----------------------------<  109<H>  4.2   |  23  |  1.21    Ca    8.9      10 Dec 2021 07:27  Phos  3.1     12-10  Mg     2.10     12-10    TPro  6.9  /  Alb  3.6  /  TBili  0.4  /  DBili  x   /  AST  19  /  ALT  15  /  AlkPhos  58  12-10    Creatinine Trend: 1.21<--, 1.32<--, 1.34<--, 0.98<--, 1.19<--    Urinalysis Basic - ( 09 Dec 2021 20:28 )    Color: Light Yellow / Appearance: Clear / S.014 / pH: x  Gluc: x / Ketone: Negative  / Bili: Negative / Urobili: <2 mg/dL   Blood: x / Protein: Negative / Nitrite: Negative   Leuk Esterase: Negative / RBC: x / WBC x   Sq Epi: x / Non Sq Epi: x / Bacteria: x            MICROBIOLOGY:     Culture - Sputum (collected 08 Dec 2021 19:00)  Source: .Sputum Sputum  Gram Stain (09 Dec 2021 07:36):    Few polymorphonuclear leukocytes per low power field    Few Squamous epithelial cells per low power field    Rare Gram Negative Rods seen per oil power field    Few Gram positive cocci in pairs seen per oil power field  Final Report (10 Dec 2021 18:06):    Normal Respiratory Noemy present    Culture - Urine (collected 08 Dec 2021 14:10)  Source: Clean Catch Clean Catch (Midstream)  Final Report (09 Dec 2021 12:07):    No growth    Culture - Nose (collected 08 Dec 2021 12:10)  Source: .Nose  Final Report (10 Dec 2021 16:54):    No staphylococcus aureus isolated.    "PCR is more Sensitive for identifying MRSA/MSSA."        RADIOLOGY:  [ ] Reviewed and interpreted by me    PULMONARY FUNCTION TESTS:    EKG:
[facility]  Claudia Benoit MD  Pager (M-F, 8A-5P): [phone]      Patient is a 66y old  Male who presents with a chief complaint of Chest pain and shortness of breath. (07 Dec 2021 08:20)      SUBJECTIVE / OVERNIGHT EVENTS: Patient is feeling better       MEDICATIONS  (STANDING):  heparin   Injectable 5000 Unit(s) SubCutaneous every 12 hours  influenza  Vaccine (HIGH DOSE) 0.7 milliLiter(s) IntraMuscular once  piperacillin/tazobactam IVPB.. 3.375 Gram(s) IV Intermittent every 8 hours  sodium chloride 0.9% lock flush 3 milliLiter(s) IV Push every 8 hours  vancomycin  IVPB 1000 milliGRAM(s) IV Intermittent every 24 hours    MEDICATIONS  (PRN):  acetaminophen     Tablet .. 650 milliGRAM(s) Oral every 6 hours PRN Temp greater or equal to 38C (100.4F), Mild Pain (1 - 3)      CAPILLARY BLOOD GLUCOSE        I&O's Summary    06 Dec 2021 07:01  -  07 Dec 2021 07:00  --------------------------------------------------------  IN: 0 mL / OUT: 450 mL / NET: -450 mL        PHYSICAL EXAM:  Vital Signs Last 24 Hrs  T(C): 36.7 (07 Dec 2021 09:39), Max: 36.7 (06 Dec 2021 19:26)  T(F): 98 (07 Dec 2021 09:39), Max: 98 (06 Dec 2021 19:26)  HR: 75 (07 Dec 2021 09:39) (70 - 84)  BP: 117/76 (07 Dec 2021 09:39) (117/76 - 157/89)  BP(mean): --  RR: 18 (07 Dec 2021 09:39) (18 - 19)  SpO2: 98% (07 Dec 2021 09:39) (97% - 100%)  CONSTITUTIONAL: NAD, well-developed, well-groomed  EYES: EOMI; conjunctiva and sclera clear  NECK: Supple, no palpable masses; no thyromegaly  RESPIRATORY: Normal respiratory effort; lungs diminished to auscultation bilaterally  CARDIOVASCULAR: Regular rate and rhythm, normal S1 and S2, ; No lower extremity edema;  ABDOMEN: Nontender to palpation, normoactive bowel sounds, no rebound/guarding;   MUSKULOSKELETAL:  no clubbing or cyanosis of digits; no joint swelling or tenderness to palpation  PSYCH: A+O to person, place, and time; affect appropriate  NEUROLOGY: CN 2-12 are intact and symmetric; no gross sensory deficits;   SKIN: No rashes; no palpable lesions    LABS:                        11.8   6.78  )-----------( 360      ( 07 Dec 2021 06:42 )             36.4     12-07    136  |  101  |  14  ----------------------------<  109<H>  4.2   |  23  |  0.98    Ca    9.7      07 Dec 2021 06:42  Phos  3.2     12-07  Mg     2.10     12-07    TPro  7.7  /  Alb  4.2  /  TBili  0.4  /  DBili  x   /  AST  23  /  ALT  20  /  AlkPhos  77  12-06    PT/INR - ( 06 Dec 2021 11:35 )   PT: 13.9 sec;   INR: 1.22 ratio         PTT - ( 06 Dec 2021 11:35 )  PTT:31.3 sec  CARDIAC MARKERS ( 07 Dec 2021 01:08 )  x     / x     / 91 U/L / x     / 1.0 ng/mL            RADIOLOGY & ADDITIONAL TESTS:  Results Reviewed.  Imaging Personally Reviewed.  telemetry- SR     COORDINATION OF CARE:  Care Discussed with ACP
[facility]  Claudia Benoit MD  Pager (M-F, 8A-5P): [phone]      Patient is a 66y old  Male who presents with a chief complaint of Chest pain and shortness of breath. (10 Dec 2021 14:46)      SUBJECTIVE / OVERNIGHT EVENTS: patient has oxygen delivered last night   ADDITIONAL REVIEW OF SYSTEMS: no complaints , stable     MEDICATIONS  (STANDING):  albuterol/ipratropium for Nebulization 3 milliLiter(s) Nebulizer every 6 hours  amoxicillin  875 milliGRAM(s)/clavulanate 1 Tablet(s) Oral two times a day  buDESOnide    Inhalation Suspension 0.5 milliGRAM(s) Inhalation every 12 hours  fluticasone propionate 50 MICROgram(s)/spray Nasal Spray 1 Spray(s) Both Nostrils two times a day  heparin   Injectable 5000 Unit(s) SubCutaneous every 12 hours  influenza  Vaccine (HIGH DOSE) 0.7 milliLiter(s) IntraMuscular once  pantoprazole    Tablet 40 milliGRAM(s) Oral before breakfast  polyethylene glycol 3350 17 Gram(s) Oral daily  senna 2 Tablet(s) Oral at bedtime  sodium chloride 0.9% lock flush 3 milliLiter(s) IV Push every 8 hours  valACYclovir 500 milliGRAM(s) Oral daily  valACYclovir        MEDICATIONS  (PRN):  acetaminophen     Tablet .. 650 milliGRAM(s) Oral every 6 hours PRN Temp greater or equal to 38C (100.4F), Mild Pain (1 - 3)      CAPILLARY BLOOD GLUCOSE        I&O's Summary      PHYSICAL EXAM:  Vital Signs Last 24 Hrs  T(C): 37.1 (11 Dec 2021 11:18), Max: 37.1 (11 Dec 2021 11:18)  T(F): 98.7 (11 Dec 2021 11:18), Max: 98.7 (11 Dec 2021 11:18)  HR: 84 (11 Dec 2021 15:20) (84 - 99)  BP: 115/72 (11 Dec 2021 11:18) (103/67 - 134/76)  BP(mean): --  RR: 18 (11 Dec 2021 11:18) (17 - 18)  SpO2: 100% (11 Dec 2021 11:18) (85% - 100%)  CONSTITUTIONAL: NAD, well-developed, well-groomed  EYES: EOMI; conjunctiva and sclera clear  NECK: Supple, no palpable masses; no thyromegaly  RESPIRATORY: Normal respiratory effort; lungs are clear to auscultation bilaterally  CARDIOVASCULAR: Regular rate and rhythm, normal S1 and S2, no murmur/rub/gallop; No lower extremity edema;   ABDOMEN: Nontender to palpation, normoactive bowel sounds, no rebound/guarding;   MUSKULOSKELETAL:  no clubbing or cyanosis of digits; no joint swelling or tenderness to palpation  PSYCH: A+O to person, place, and time; affect appropriate  NEUROLOGY: CN 2-12 are intact and symmetric; no gross sensory deficits;   SKIN: No rashes; no palpable lesions    LABS:                        11.4   8.55  )-----------( 423      ( 10 Dec 2021 07:27 )             35.9     12-10    135  |  102  |  11  ----------------------------<  109<H>  4.2   |  23  |  1.21    Ca    8.9      10 Dec 2021 07:27  Phos  3.1     12-10  Mg     2.10     12-10    TPro  6.9  /  Alb  3.6  /  TBili  0.4  /  DBili  x   /  AST  19  /  ALT  15  /  AlkPhos  58  12-10          Urinalysis Basic - ( 09 Dec 2021 20:28 )    Color: Light Yellow / Appearance: Clear / S.014 / pH: x  Gluc: x / Ketone: Negative  / Bili: Negative / Urobili: <2 mg/dL   Blood: x / Protein: Negative / Nitrite: Negative   Leuk Esterase: Negative / RBC: x / WBC x   Sq Epi: x / Non Sq Epi: x / Bacteria: x        Culture - Sputum (collected 08 Dec 2021 19:00)  Source: .Sputum Sputum  Gram Stain (09 Dec 2021 07:36):    Few polymorphonuclear leukocytes per low power field    Few Squamous epithelial cells per low power field    Rare Gram Negative Rods seen per oil power field    Few Gram positive cocci in pairs seen per oil power field  Final Report (10 Dec 2021 18:06):    Normal Respiratory Noemy present        RADIOLOGY & ADDITIONAL TESTS:  Results Reviewed.   Imaging Personally Reviewed.  telemetry- SR     COORDINATION OF CARE:  Care Discussed with ACP 
[facility]  Claudia Benoit MD  Pager (M-F, 8A-5P): [phone]      Patient is a 66y old  Male who presents with a chief complaint of Chest pain and shortness of breath. (10 Dec 2021 12:16)      SUBJECTIVE / OVERNIGHT EVENTS: Patient clinically improving   ADDITIONAL REVIEW OF SYSTEMS: noted hypoxic 84-86% at night and with ambulation , needs nursing documentation in the chart, needs home O2 ? pending documentation      MEDICATIONS  (STANDING):  albuterol/ipratropium for Nebulization 3 milliLiter(s) Nebulizer every 6 hours  buDESOnide    Inhalation Suspension 0.5 milliGRAM(s) Inhalation every 12 hours  heparin   Injectable 5000 Unit(s) SubCutaneous every 12 hours  influenza  Vaccine (HIGH DOSE) 0.7 milliLiter(s) IntraMuscular once  pantoprazole    Tablet 40 milliGRAM(s) Oral before breakfast  piperacillin/tazobactam IVPB.. 3.375 Gram(s) IV Intermittent every 8 hours  polyethylene glycol 3350 17 Gram(s) Oral daily  senna 2 Tablet(s) Oral at bedtime  sodium chloride 0.9% lock flush 3 milliLiter(s) IV Push every 8 hours  valACYclovir      valACYclovir 500 milliGRAM(s) Oral daily    MEDICATIONS  (PRN):  acetaminophen     Tablet .. 650 milliGRAM(s) Oral every 6 hours PRN Temp greater or equal to 38C (100.4F), Mild Pain (1 - 3)      CAPILLARY BLOOD GLUCOSE        I&O's Summary      PHYSICAL EXAM:  Vital Signs Last 24 Hrs  T(C): 36.5 (10 Dec 2021 11:14), Max: 37.7 (09 Dec 2021 21:54)  T(F): 97.7 (10 Dec 2021 11:14), Max: 99.9 (09 Dec 2021 21:54)  HR: 89 (10 Dec 2021 11:14) (78 - 92)  BP: 124/77 (10 Dec 2021 11:14) (121/77 - 142/76)  BP(mean): --  RR: 18 (10 Dec 2021 11:14) (17 - 18)  SpO2: 97% (10 Dec 2021 11:14) (96% - 100%)  CONSTITUTIONAL: NAD, well-developed, well-groomed, obese   EYES: EOMI; conjunctiva and sclera clear  NECK: Supple, no palpable masses; no thyromegaly  RESPIRATORY: Normal respiratory effort; lungs are diminished  to auscultation bilaterally  CARDIOVASCULAR: Regular rate and rhythm, normal S1 and S2, ; No lower extremity edema;   ABDOMEN: Nontender to palpation, normoactive bowel sounds, no rebound/guarding;   MUSKULOSKELETAL:  no clubbing or cyanosis of digits; no joint swelling or tenderness to palpation  PSYCH: A+O to person, place, and time; affect appropriate  NEUROLOGY: CN 2-12 are intact and symmetric; no gross sensory deficits;   SKIN: No rashes; no palpable lesions    LABS:                        11.4   8.55  )-----------( 423      ( 10 Dec 2021 07:27 )             35.9     12-10    135  |  102  |  11  ----------------------------<  109<H>  4.2   |  23  |  1.21    Ca    8.9      10 Dec 2021 07:27  Phos  3.1     12-10  Mg     2.10     12-10    TPro  6.9  /  Alb  3.6  /  TBili  0.4  /  DBili  x   /  AST  19  /  ALT  15  /  AlkPhos  58  12-10          Urinalysis Basic - ( 09 Dec 2021 20:28 )    Color: Light Yellow / Appearance: Clear / S.014 / pH: x  Gluc: x / Ketone: Negative  / Bili: Negative / Urobili: <2 mg/dL   Blood: x / Protein: Negative / Nitrite: Negative   Leuk Esterase: Negative / RBC: x / WBC x   Sq Epi: x / Non Sq Epi: x / Bacteria: x        Culture - Sputum (collected 08 Dec 2021 22:54)  Source: .Sputum Sputum  Gram Stain (09 Dec 2021 07:36):    Few polymorphonuclear leukocytes per low power field    Few Squamous epithelial cells per low power field    Rare Gram Negative Rods seen per oil power field    Few Gram positive cocci in pairs seen per oil power field  Preliminary Report (09 Dec 2021 19:38):    Normal Respiratory Noemy present    Culture - Nose (collected 08 Dec 2021 16:50)  Source: .Nose  Preliminary Report (09 Dec 2021 19:52):    No Staphylococcus aureus isolated to date    Culture - Urine (collected 08 Dec 2021 14:10)  Source: Clean Catch Clean Catch (Midstream)  Final Report (09 Dec 2021 12:07):    No growth        RADIOLOGY & ADDITIONAL TESTS:  Results Reviewed.  Imaging Personally Reviewed.    COORDINATION OF CARE:  Care Discussed with ACP 
[facility]  Claudia Benoit MD  Pager (M-F, 8A-5P): [phone]      Patient is a 66y old  Male who presents with a chief complaint of Chest pain and shortness of breath. (07 Dec 2021 16:48)      SUBJECTIVE / OVERNIGHT EVENTS: low grade fever overnight   On atb, cultures pending     MEDICATIONS  (STANDING):  albuterol/ipratropium for Nebulization 3 milliLiter(s) Nebulizer every 6 hours  buDESOnide    Inhalation Suspension 0.5 milliGRAM(s) Inhalation every 12 hours  heparin   Injectable 5000 Unit(s) SubCutaneous every 12 hours  influenza  Vaccine (HIGH DOSE) 0.7 milliLiter(s) IntraMuscular once  pantoprazole    Tablet 40 milliGRAM(s) Oral before breakfast  piperacillin/tazobactam IVPB.. 3.375 Gram(s) IV Intermittent every 8 hours  sodium chloride 0.9% lock flush 3 milliLiter(s) IV Push every 8 hours  valACYclovir      valACYclovir 500 milliGRAM(s) Oral daily  vancomycin  IVPB 1000 milliGRAM(s) IV Intermittent every 24 hours    MEDICATIONS  (PRN):  acetaminophen     Tablet .. 650 milliGRAM(s) Oral every 6 hours PRN Temp greater or equal to 38C (100.4F), Mild Pain (1 - 3)      CAPILLARY BLOOD GLUCOSE        I&O's Summary      PHYSICAL EXAM:  Vital Signs Last 24 Hrs  T(C): 36.9 (08 Dec 2021 12:08), Max: 38.2 (07 Dec 2021 22:39)  T(F): 98.5 (08 Dec 2021 12:08), Max: 100.8 (07 Dec 2021 22:39)  HR: 81 (08 Dec 2021 12:08) (80 - 98)  BP: 121/73 (08 Dec 2021 12:08) (121/73 - 143/73)  BP(mean): --  RR: 18 (08 Dec 2021 12:08) (18 - 18)  SpO2: 100% (08 Dec 2021 12:08) (99% - 100%)  CONSTITUTIONAL: NAD, well-developed, well-groomed  EYES: EOMI; conjunctiva and sclera clear  NECK: Supple, no palpable masses; no thyromegaly  RESPIRATORY: Normal respiratory effort; diminished to auscultation bilaterally, scattered wheezing   CARDIOVASCULAR: Regular rate and rhythm, normal S1 and S2, No lower extremity edema; Peripheral pulses are 2+ bilaterally  ABDOMEN: Nontender to palpation, normoactive bowel sounds, no rebound/guarding;   MUSKULOSKELETAL:  no clubbing or cyanosis of digits; no joint swelling or tenderness to palpation  PSYCH: A+O to person, place, and time; affect appropriate  NEUROLOGY: CN 2-12 are intact and symmetric; no gross sensory deficits;   SKIN: No rashes; no palpable lesions    LABS:                        12.2   7.14  )-----------( 420      ( 08 Dec 2021 06:40 )             38.1     12-08    139  |  105  |  18  ----------------------------<  95  4.1   |  21<L>  |  1.34<H>    Ca    9.2      08 Dec 2021 06:40  Phos  4.2     12-08  Mg     2.00     12-08        CARDIAC MARKERS ( 07 Dec 2021 01:08 )  x     / x     / 91 U/L / x     / 1.0 ng/mL          Culture - Blood (collected 06 Dec 2021 14:39)  Source: .Blood Blood  Preliminary Report (07 Dec 2021 15:02):    No growth to date.    Culture - Blood (collected 06 Dec 2021 14:39)  Source: .Blood Blood  Preliminary Report (07 Dec 2021 15:02):    No growth to date.        RADIOLOGY & ADDITIONAL TESTS:  Results Reviewed.  Imaging Personally Reviewed.    COORDINATION OF CARE:  Care Discussed with ACP
[facility]  Claudia Benoit MD  Pager (M-F, 8A-5P): [phone]      Patient is a 66y old  Male who presents with a chief complaint of Chest pain and shortness of breath. (08 Dec 2021 20:13)      SUBJECTIVE / OVERNIGHT EVENTS: no events over night  MRSA nares pending   LOLY , poss sec to vanco toxicity , check UA, sediment and protein/ creatinine ratio   neeeds eval for home O2, poss OLEGARIO , sleep study outpatient     ADDITIONAL REVIEW OF SYSTEMS:    MEDICATIONS  (STANDING):  albuterol/ipratropium for Nebulization 3 milliLiter(s) Nebulizer every 6 hours  azithromycin   Tablet 250 milliGRAM(s) Oral every 24 hours  buDESOnide    Inhalation Suspension 0.5 milliGRAM(s) Inhalation every 12 hours  heparin   Injectable 5000 Unit(s) SubCutaneous every 12 hours  influenza  Vaccine (HIGH DOSE) 0.7 milliLiter(s) IntraMuscular once  pantoprazole    Tablet 40 milliGRAM(s) Oral before breakfast  piperacillin/tazobactam IVPB.. 3.375 Gram(s) IV Intermittent every 8 hours  sodium chloride 0.9% lock flush 3 milliLiter(s) IV Push every 8 hours  valACYclovir      valACYclovir 500 milliGRAM(s) Oral daily  vancomycin  IVPB 1000 milliGRAM(s) IV Intermittent every 24 hours    MEDICATIONS  (PRN):  acetaminophen     Tablet .. 650 milliGRAM(s) Oral every 6 hours PRN Temp greater or equal to 38C (100.4F), Mild Pain (1 - 3)      CAPILLARY BLOOD GLUCOSE        I&O's Summary      PHYSICAL EXAM:  Vital Signs Last 24 Hrs  T(C): 36.8 (09 Dec 2021 11:55), Max: 36.9 (08 Dec 2021 20:46)  T(F): 98.3 (09 Dec 2021 11:55), Max: 98.5 (08 Dec 2021 20:46)  HR: 85 (09 Dec 2021 11:55) (79 - 86)  BP: 124/76 (09 Dec 2021 11:55) (118/55 - 124/76)  BP(mean): --  RR: 18 (09 Dec 2021 11:55) (18 - 18)  SpO2: 99% (09 Dec 2021 11:55) (97% - 99%)  CONSTITUTIONAL: NAD, well-developed, well-groomed  EYES: EOMI; conjunctiva and sclera clear  NECK: Supple, no palpable masses; no thyromegaly  RESPIRATORY: Normal respiratory effort; lungs are diminished  to auscultation bilaterally  CARDIOVASCULAR: Regular rate and rhythm, normal S1 and S2, no murmur/rub/gallop; trace  lower extremity edema;  ABDOMEN: Nontender to palpation, normoactive bowel sounds, no rebound/guarding;   MUSKULOSKELETAL:  no clubbing or cyanosis of digits; no joint swelling or tenderness to palpation  PSYCH: A+O to person, place, and time; affect appropriate  NEUROLOGY: CN 2-12 are intact and symmetric; no gross sensory deficits;   SKIN: No rashes; no palpable lesions    LABS:                        11.5   7.75  )-----------( 418      ( 09 Dec 2021 07:04 )             35.4     12-09    135  |  99  |  12  ----------------------------<  95  4.0   |  22  |  1.32<H>    Ca    9.1      09 Dec 2021 07:04  Phos  3.3     12-09  Mg     1.90     12-09                Culture - Sputum (collected 08 Dec 2021 22:54)  Source: .Sputum Sputum  Gram Stain (09 Dec 2021 07:36):    Few polymorphonuclear leukocytes per low power field    Few Squamous epithelial cells per low power field    Rare Gram Negative Rods seen per oil power field    Few Gram positive cocci in pairs seen per oil power field    Culture - Urine (collected 08 Dec 2021 14:10)  Source: Clean Catch Clean Catch (Midstream)  Final Report (09 Dec 2021 12:07):    No growth        RADIOLOGY & ADDITIONAL TESTS:  Results Reviewed.   Imaging Personally Reviewed.      COORDINATION OF CARE:  Care Discussed with ACP

## 2021-12-13 ENCOUNTER — APPOINTMENT (OUTPATIENT)
Dept: INFUSION THERAPY | Facility: HOSPITAL | Age: 66
End: 2021-12-13

## 2021-12-15 ENCOUNTER — NON-APPOINTMENT (OUTPATIENT)
Age: 66
End: 2021-12-15

## 2021-12-20 ENCOUNTER — APPOINTMENT (OUTPATIENT)
Dept: PULMONOLOGY | Facility: CLINIC | Age: 66
End: 2021-12-20

## 2021-12-20 ENCOUNTER — APPOINTMENT (OUTPATIENT)
Dept: PULMONOLOGY | Facility: CLINIC | Age: 66
End: 2021-12-20
Payer: MEDICARE

## 2021-12-20 PROCEDURE — 99215 OFFICE O/P EST HI 40 MIN: CPT | Mod: 95

## 2021-12-21 NOTE — CONSULT LETTER
[Dear  ___] : Dear  [unfilled], [Consult Letter:] : I had the pleasure of evaluating your patient, [unfilled]. [Please see my note below.] : Please see my note below. [Consult Closing:] : Thank you very much for allowing me to participate in the care of this patient.  If you have any questions, please do not hesitate to contact me. [Sincerely,] : Sincerely, [FreeTextEntry2] : Dr. Catalina Mayen MD\par Fax: [phone] [FreeTextEntry3] : Kennedy Diamond MD\par Attending Physician in Pulmonary & Critical Care Medicine\par  of Medicine\par [employer] [facility]

## 2021-12-21 NOTE — PHYSICAL EXAM
[No Acute Distress] : no acute distress [Well Nourished] : well nourished [Well Developed] : well developed [TextBox_2] : appears well, no tachypnea

## 2021-12-21 NOTE — ASSESSMENT
[FreeTextEntry1] : Mr. Mercado is a 66 year-old male with a history of B-cell lymphoma s/p 5 cycles of R-CHOP (last in Nov 2021) who presents for follow-up after recent hospitalization for pleuritic chest pain, fevers, and cough, found on CT Chest to have peripheral lower lobe-predominant ill-defined groundglass densities bilaterally, possibly due to infection (e.g., viral or atypical bacterial) vs. organizing pneumonia. Symptoms improved with antibiotics. He had no hypoxemia during the day, but was noted to have nocturnal hypoxemia requiring home oxygen, now reportedly resolved.\par \par 1. Community acquired pneumonia:\par - Labs during hospitalization initially with bandemia (16%) that resolved. Blood and urine cultures negative. Sputum culture with normal respiratory nicolasa. MRSA, urine legionella, and RVP negative\par - He received Vanc and Zosyn during hospitalization, vanc was stopped when MRSA & sputum culture showed no MRSA. He then completed a 7-day course of antibiotics with Augmentin\par - Currently with resolution of symptoms\par - He did not start taking Asmanex or Duonebs as wheezing and cough resolved\par - Did not require steroids. I doubt that findings represent organizing pneumonia due to rituximab or cyclophosphamide\par - SpO2 currently 98% on room air\par - Repeat CT chest in early January to assess for resolution of radiographic opacities\par - Hold off on bronchoscopy for now, but if symptoms persist or worsen or radiographic opacities persist, he may require bronchoscopy with BAL/TBBx as outpatient\par \par 2. Diffuse Large B Cell Lymphoma:\par - Completed C4 of R-CHOP\par - Current cycle on hold given recent pneumonia and possible drug pneumonitis\par - Follow-up with Dr. Mayen\par \par 3. Nocturnal desaturation:\par - Patient reports that nocturnal hypoxemia is currently resolved (wife checks while he is sleeping)\par - I recommended home sleep apnea testing or overnight pulse oximetry testing to evaluate formally, but he is currently declining as he feels symptomatically improved\par \par 4. In-office follow-up after repeat CT chest

## 2021-12-21 NOTE — HISTORY OF PRESENT ILLNESS
[TextBox_4] : Mr. Mercado is a 66 year-old male with a history of B-cell lymphoma s/p 5 cycles of R-CHOP (last in Nov 2021) who presents for follow-up after recent hospitalization for pleuritic chest pain and cough. He was found on CT Chest to have peripheral lower lobe-predominant ill-defined groundglass densities bilaterally, possibly due to infection (e.g., viral or atypical bacterial) vs. organizing pneumonia. During hospitalization he was afebrile and hemodynamically stable. Fevers resolved with antibiotics. No hypoxemia on room air with SpO2 98%. There was evidence of nocturnal desaturation while sleeping, possibly due to OLEGARIO. He was discharged with home oxygen.\par \par Labs during hospitalization initially with bandemia (16%) that resolved. Blood and urine cultures negative. Sputum culture with normal respiratory nicolsaa. MRSA, urine legionella, and RVP negative. He was initially treated with vancomycin and zosyn. Vanc was discontinued when MRSA returned negative. He completed a 7-day course of antibiotics for pneumonia. It is possible that radiographic findings represent organizing pneumonia due to rituximab or cyclophosphamide, he is pending repeat CT chest in mid January to assess for resolution. His R-CHOP course in Dec was held due to worry for drug-induced pneumonitis.\par \par Currently he denies any dyspnea, cough, or wheezing. Denies any sputum production. No fevers or chills. Reports occasional mild pain with deep inspiration. Denies any chest congestion. He reports that oxygen levels while sleeping are now improved to 95-96%. He used oxygen at night the first few nights after leaving the hospital, but now he is not requiring given resolved nocturnal oxygen desaturation (wife checks while sleeping). He never started taking albuterol-ipratropium nebs or inhaled steroid after leaving the hospital. Oxygen saturation is currently 98% on room air at rest. Pending repeat CT Chest in January 2021. Hold off on bronchoscopy for now, but if symptoms persist or worsen or radiographic opacities persist, he may require bronchoscopy with BAL/TBBx as outpatient.

## 2021-12-23 ENCOUNTER — APPOINTMENT (OUTPATIENT)
Dept: HEMATOLOGY ONCOLOGY | Facility: CLINIC | Age: 66
End: 2021-12-23
Payer: MEDICARE

## 2021-12-23 VITALS
DIASTOLIC BLOOD PRESSURE: 70 MMHG | WEIGHT: 231 LBS | BODY MASS INDEX: 31.29 KG/M2 | OXYGEN SATURATION: 98 % | RESPIRATION RATE: 15 BRPM | HEIGHT: 72 IN | HEART RATE: 78 BPM | SYSTOLIC BLOOD PRESSURE: 120 MMHG

## 2021-12-23 PROCEDURE — 99214 OFFICE O/P EST MOD 30 MIN: CPT

## 2021-12-23 PROCEDURE — 85025 COMPLETE CBC W/AUTO DIFF WBC: CPT

## 2021-12-23 NOTE — ASSESSMENT
[FreeTextEntry1] : 66 year old male presenting for initial consultation for recent diagnosis of DLBCL, non-germinal center subtype. Patient had a sonoguided core biopsy of right lymph node of neck on 7/28/21 with pathology resulting in \par CD5, CD10 negative lymphoma, favor Diffuse large B-cell lymphoma, non-Germinal center subtype. Given the pathology results, he will go for an excisional biopsy next week. We discussed that we can start prednisone after the biopsy which will improve his symptoms. PET/CT demonstrated the known R cervical node (SUV 30s) and possibly a 0.9 cm perigastric LN (SUV 5).  PT under went excisional biopsy of right neck LN on 8/18. Path was consistent with DLBCL non-germinal center subtype. He is s/p 5 cycles R-CHOP on 11/22/21.\par \par #DLBCL non-germinal center subtype\par -Pt is s/p Cycle 1 R-CHOP on 8/30/21 \par -s/p C2  on 9/20/21, C3 on 10/11/21, C4 on 11/1/21, C5 11/22/21\par -PET/CT 10/2/2021:shows response to current regimen\par - Repeat CT scan done 11/19/21  which show Posttreatment changes to multiple right cervical nodes. No pathologically enlarged lymph nodes currently. Small scattered bilateral ground glass opacities, which may be infectious or inflammatory in etiology. No suspicious lymphadenopathy. \par -Was due for C6 on 12/13/2021 but was admitted to hospital for CAP requiring antibiotics and O2\par -will defer treatment with C6 given the delay\par -Plan for post treatment PET (ordered)\par -RV in 1 month\par \par # CAP\par - D/c from hospital 12/13/21\par - Improved post antibiotics; no longer requiring oxygen \par - Cont outpatient /fup with pulm- plan for repeat CT in Jan 2022 per pulm\par \par #Hx Carpal Tunnel syndrome\par -suspect joint pain related to this and not to lymphoma/chemotherapy \par -will send referral to hand surgery\par \par Follow up in 1 month\par \par Case d/w Dr. Mayen\par Ashley Walters, PGY5\par

## 2021-12-23 NOTE — PHYSICAL EXAM
[Fully active, able to carry on all pre-disease performance without restriction] : Status 0 - Fully active, able to carry on all pre-disease performance without restriction [Normal] : no peripheral adenopathy appreciated [de-identified] : right wrist possible ganglion cyst/bursitis

## 2021-12-23 NOTE — REVIEW OF SYSTEMS
[Patient Intake Form Reviewed] : Patient intake form was reviewed [Joint Pain] : joint pain [Muscle Pain] : muscle pain [Swollen Glands] : swollen glands [Negative] : Heme/Lymph [de-identified] : occasional numbness in arms; has R>L hand tingling

## 2021-12-23 NOTE — RESULTS/DATA
[FreeTextEntry1] : CBC done 12/23/21\par WBC- 3.5\par ALC 1.04\par ANC 1.39\par Hgb 13.3\par Plts 408\par \par \par Biopsy result:\par 1.  Lymph node, right neck, sono-guided core biopsy:\par - CD5, CD10 negative lymphoma, favor Diffuse large B-cell\par lymphoma, non-Germinal center subtype.\par \par See note and description.\par \par Diagnostic note:  The current right neck lymph node is\par morphologically and immunophenotypically consistent with a CD5,\par CD10 negative lymphoma, favor diffuse large B-cell lymphoma, non-\par germinal center subtype. Additional studies, including molecular\par and cytogenetics/FISH are performed and will be reported in a\par comprehensive addendum.\par \par Dr. Orozco was urgently faxed report on 8/2/21.\par \par Microscopic description:   The histologic sections of the right\par neck lymph node biopsy shows fragmented lymphoid tissue with\par areas of increased atypical larger cells. The larger cells show\par increased nuclear to cytoplasmic ratio, irregular nuclear\par contours and some with prominent nucleoli. No necrosis is present\par and mitoses/apoptosis are frequent.\par \par Immunohistochemical stains for CD3, CD5, CD20, PAX5, CD10, BCL6,\par BCL2, CyclinD1, Ki67, CD23, CD21, CD43, CD79a, MUM1 stains\par performed on formalin fixed paraffin embedded tissue, block 1A.\par \par Neoplastic cells are:  the larger atypical cells\par Positive:  CD20, PAX-5, BCL-6, MUM1, BCL2, p53 and some scattered\par CD30 positive cells, KI-67% is high within the areas of the\par larger cells (70-80%), c-MYC\par Negative: CD3, CD5, CD10, CD23 and CD21 (in the areas of the\par larger cells), ROSE\par Other:  CD3 and CD5 are positive in T-cells\par \par Flow cytometry: Monotypic B-cells (33% of cells), positive for\par kappa, CD19, CD20, FMC-7; negative CD5, CD10, CD23. The findings\par are consistent with CD5 negative, CD10 negative B-cell\par lymphoproliferative disorder/lymphoma\par \par \par PET/CT 8/12/21\par IMPRESSION: Abnormal FDG-PET/CT scan.\par \par 1. FDG-avid right cervical lymphadenopathy corresponds to biopsy-proven lymphoma.\par \par 2. Small FDG-avid perigastric lymph node is indeterminate, possibly lymphoma.\par \par --- End of Report ---\par

## 2021-12-23 NOTE — END OF VISIT
[FreeTextEntry3] : Patient seen and case discussed with Dr. Walters. 65 yo male with Stage 3 DLBCL s/p 5 cycles R-CHOP, admitted for organizing pneumonia treated as CAP, now improved. Plan for EOT PET/CT. Follow up in 1 month  [Time Spent: ___ minutes] : I have spent [unfilled] minutes of time on the encounter.

## 2021-12-23 NOTE — DISCUSSION/SUMMARY
[Home] : at home, [unfilled] , at the time of the visit. [Medical Office: ([facility])___] : at the medical office located in  [Verbal consent obtained from patient] : the patient, [unfilled] [Time Spent: ___ minutes] : I have spent [unfilled] minutes with the patient on the telephone

## 2021-12-23 NOTE — HISTORY OF PRESENT ILLNESS
[Treatment Protocol] : Treatment Protocol [de-identified] : Mr. Gregory Mercado is a 66 year old man with PMHx of Us esophagus, history of two knee replacements, hip replacements, neck surgery, arm surgery, and lower back surgery, He takes baclofen, nabumetone, naproxen, and sometimes Tylenol extra strength for pain. He also take omeprazole. Patient presents today for initial consultation for recent diagnosis of DLBCL, non-germinal center subtype. Patient had a sonoguided core biopsy of right lymph node of neck on 7/28/21 with pathology resulting in \par CD5, CD10 negative lymphoma, favor Diffuse large B-cell lymphoma, non-Germinal center subtype.  \par \par Patient reports he had enlarged right neck mass that began growing 1 month ago. He reported this to his PCP, who proceeded the patient to have sono guide core biopsy. \par He is a retired  and is now a full time .\par \par Started R-CHOP 8/30/21. PET/CT on 10/2/2021- shows response to therapy. Pt had CT scans on neck/abd/pelvis done 11/19/21 which show Posttreatment changes to multiple right cervical nodes. No pathologically enlarged lymph nodes currently. Small scattered bilateral groundglass opacities, which may be infectious or inflammatory in etiology. No suspicious lymphadenopathy. He received Cycle 5 of R-CHOP chemotherapy on 1/122/21. On 12/3/21 developed new onset chest pain and hortness of breath. Was recommended to go to the ER, did not want to go, attempted to arrange outpatient CT PE which was unable to be done. he was admitted from 12/6/21-12/11/21 for new onset pleuritic chest pain and shortness of breath, found to have organizing pneumonia on CT imaging, improved with antibiotics. He is now off supplemental O2.  [FreeTextEntry1] : R-CHOP: 8/30/21- 11/22/21 (5 cycles) [de-identified] : 12/23/21\par Pt s/p cycle 5 of R-CHOP and tolerated treatment on 11/22/21. D/c on 12/13 after t/m for CAP- needed oxygen for 2 days after d/c from hospital but is much improved now.  Denies fever/chills, No drenching night sweats. No CP/SOB. No lower extremity edema. Appetite is okay. Weight stable. No abd pain. No diarrhea/constipation. No hematuria, dysuria. No recent/recurrent infections. Energy levels stable. Denies numbness/tingling of hands or feet. No mouth sores.

## 2021-12-27 LAB
ALBUMIN SERPL ELPH-MCNC: 4.3 G/DL
ALP BLD-CCNC: 80 U/L
ALT SERPL-CCNC: 31 U/L
ANION GAP SERPL CALC-SCNC: 12 MMOL/L
AST SERPL-CCNC: 28 U/L
BILIRUB SERPL-MCNC: 0.3 MG/DL
BUN SERPL-MCNC: 12 MG/DL
CALCIUM SERPL-MCNC: 9.7 MG/DL
CHLORIDE SERPL-SCNC: 104 MMOL/L
CO2 SERPL-SCNC: 26 MMOL/L
CREAT SERPL-MCNC: 1.1 MG/DL
GLUCOSE SERPL-MCNC: 82 MG/DL
LDH SERPL-CCNC: 241 U/L
MAGNESIUM SERPL-MCNC: 2 MG/DL
PHOSPHATE SERPL-MCNC: 3.2 MG/DL
POTASSIUM SERPL-SCNC: 4.4 MMOL/L
PROT SERPL-MCNC: 7.2 G/DL
SODIUM SERPL-SCNC: 142 MMOL/L
URATE SERPL-MCNC: 6.5 MG/DL

## 2022-01-06 ENCOUNTER — APPOINTMENT (OUTPATIENT)
Dept: PULMONOLOGY | Facility: CLINIC | Age: 67
End: 2022-01-06
Payer: MEDICARE

## 2022-01-06 PROCEDURE — 99443: CPT

## 2022-01-06 NOTE — REASON FOR VISIT
[Follow-Up - From Hospitalization] : a follow-up visit after a recent hospitalization [Abnormal CXR/ Chest CT] : an abnormal CXR/ chest CT [Pneumonia] : pneumonia [Home] : at home, [unfilled] , at the time of the visit. [Medical Office: ([facility])___] : at the medical office located in

## 2022-01-06 NOTE — HISTORY OF PRESENT ILLNESS
[TextBox_4] : Mr. Mercado is a 66 year-old male with a history of B-cell lymphoma s/p 5 cycles of R-CHOP (last in Nov 2021) who presents for follow-up after recent hospitalization for pleuritic chest pain and cough. He was found on CT Chest to have peripheral lower lobe-predominant ill-defined groundglass densities bilaterally, possibly due to infection (e.g., viral or atypical bacterial) vs. organizing pneumonia. During hospitalization he was afebrile and hemodynamically stable. Fevers resolved with antibiotics. No hypoxemia on room air with SpO2 98%. There was evidence of nocturnal desaturation while sleeping, possibly due to OLEGARIO. He was discharged with home oxygen.\par \par Labs during hospitalization initially with bandemia (16%) that resolved. Blood and urine cultures negative. Sputum culture with normal respiratory nicolasa. MRSA, urine legionella, and RVP negative. He was initially treated with vancomycin and zosyn. Vanc was discontinued when MRSA returned negative. He completed a 7-day course of antibiotics for pneumonia. It is possible that radiographic findings represent organizing pneumonia due to rituximab or cyclophosphamide, he is pending repeat CT chest in mid January to assess for resolution. His R-CHOP course in Dec was held due to worry for drug-induced pneumonitis.\par \par Currently he denies any dyspnea, cough, or wheezing. Denies any sputum production. No fevers or chills. Reports occasional mild pain with deep inspiration. Denies any chest congestion. He reports that oxygen levels while sleeping are now improved to 95-96%. He used oxygen at night the first few nights after leaving the hospital, but now he is not requiring given resolved nocturnal oxygen desaturation (wife checks while sleeping). He never started taking albuterol-ipratropium nebs or inhaled steroid after leaving the hospital. Oxygen saturation is currently 98% on room air at rest. Pending repeat CT Chest in January 2021. Hold off on bronchoscopy for now, but if symptoms persist or worsen or radiographic opacities persist, he may require bronchoscopy with BAL/TBBx as outpatient. \par \par \par \par 1/6/2022 TTM w/daughter and pt. Treated for pneumonia last month. Following oncology for b cell lyphoma\par c/o sharp pain in left shoulder radiating to chest with inspiration. Some dyspnea on exertion, afebrile. Has oxygen at home but has not needed to use it \par c/o generalized body itching and some areas of rash\par covid vac x 2\par o2 sat 98-99%

## 2022-01-06 NOTE — CONSULT LETTER
[Dear  ___] : Dear  [unfilled], [Consult Letter:] : I had the pleasure of evaluating your patient, [unfilled]. [Please see my note below.] : Please see my note below. [Consult Closing:] : Thank you very much for allowing me to participate in the care of this patient.  If you have any questions, please do not hesitate to contact me. [Sincerely,] : Sincerely, [FreeTextEntry2] : Dr. Ctaalina Mayen MD\par Fax: [phone] [FreeTextEntry3] : Kennedy Diamond MD\par Attending Physician in Pulmonary & Critical Care Medicine\par  of Medicine\par [employer] [facility]

## 2022-01-06 NOTE — ASSESSMENT
[FreeTextEntry1] : Mr. Mercado is a 66 year-old male with a history of B-cell lymphoma s/p 5 cycles of R-CHOP (last in Nov 2021) who presents for follow-up after recent hospitalization for pleuritic chest pain, fevers, and cough, found on CT Chest to have peripheral lower lobe-predominant ill-defined groundglass densities bilaterally, possibly due to infection (e.g., viral or atypical bacterial) vs. organizing pneumonia. Symptoms improved with antibiotics. He had no hypoxemia during the day, but was noted to have nocturnal hypoxemia requiring home oxygen, now reportedly resolved.\par \par 1. Community acquired pneumonia: off antibiotics and steroids, has oxygen at home but o2 sats are good and has not needed to use it -continue o2 sat monitoring\par - \par 2. Diffuse Large B Cell Lymphoma:\par - Completed C4 of R-CHOP\par - Current cycle on hold given recent pneumonia and possible drug pneumonitis\par - Follow-up with Dr. Mayen o/s PET scan\par \par 3- advised to call oncology and discuss onset of new symptoms\par Discussed case w/Dr Diamond who will call pt to discuss further -if needed in office eval tomorrow \par \par \par \par \par Thanks for allowing  me to participate  in the care of this patient.  Patient at this time  will follow  the above mentioned recommendations and return back for follow up visit. If you have any questions  I can be reached  at # [phone] (office).\par \par \par Nicole Cummings MD, FCCP \par Director, Pulmonary Hypertension Program \par [facility] \par Division of Pulmonary, Critical Care and Sleep Medicine \par  Professor of Medicine \par [employer]\par \par KENDRICK Zendejas\par

## 2022-01-07 ENCOUNTER — RESULT REVIEW (OUTPATIENT)
Age: 67
End: 2022-01-07

## 2022-01-09 LAB — SARS-COV-2 N GENE NPH QL NAA+PROBE: NOT DETECTED

## 2022-01-10 ENCOUNTER — APPOINTMENT (OUTPATIENT)
Dept: CT IMAGING | Facility: IMAGING CENTER | Age: 67
End: 2022-01-10

## 2022-01-10 ENCOUNTER — NON-APPOINTMENT (OUTPATIENT)
Age: 67
End: 2022-01-10

## 2022-01-12 ENCOUNTER — APPOINTMENT (OUTPATIENT)
Dept: CT IMAGING | Facility: IMAGING CENTER | Age: 67
End: 2022-01-12

## 2022-01-13 LAB
ALBUMIN SERPL ELPH-MCNC: 4.5 G/DL
ALP BLD-CCNC: 107 U/L
ALT SERPL-CCNC: 28 U/L
ANION GAP SERPL CALC-SCNC: 13 MMOL/L
AST SERPL-CCNC: 27 U/L
BASOPHILS # BLD AUTO: 0.06 K/UL
BASOPHILS NFR BLD AUTO: 0.8 %
BILIRUB SERPL-MCNC: 0.4 MG/DL
BUN SERPL-MCNC: 12 MG/DL
CALCIUM SERPL-MCNC: 9.9 MG/DL
CHLORIDE SERPL-SCNC: 105 MMOL/L
CO2 SERPL-SCNC: 26 MMOL/L
CREAT SERPL-MCNC: 1.11 MG/DL
EOSINOPHIL # BLD AUTO: 0.1 K/UL
EOSINOPHIL NFR BLD AUTO: 1.4 %
GLUCOSE SERPL-MCNC: 88 MG/DL
HCT VFR BLD CALC: 43.5 %
HGB BLD-MCNC: 14.1 G/DL
IMM GRANULOCYTES NFR BLD AUTO: 0.3 %
LDH SERPL-CCNC: 213 U/L
LYMPHOCYTES # BLD AUTO: 1.11 K/UL
LYMPHOCYTES NFR BLD AUTO: 15.5 %
MAGNESIUM SERPL-MCNC: 1.9 MG/DL
MAN DIFF?: NORMAL
MCHC RBC-ENTMCNC: 28.8 PG
MCHC RBC-ENTMCNC: 32.4 GM/DL
MCV RBC AUTO: 88.8 FL
MONOCYTES # BLD AUTO: 0.86 K/UL
MONOCYTES NFR BLD AUTO: 12 %
NEUTROPHILS # BLD AUTO: 5.03 K/UL
NEUTROPHILS NFR BLD AUTO: 70 %
PHOSPHATE SERPL-MCNC: 3.6 MG/DL
PLATELET # BLD AUTO: 290 K/UL
POTASSIUM SERPL-SCNC: 4.5 MMOL/L
PROT SERPL-MCNC: 7.1 G/DL
RBC # BLD: 4.9 M/UL
RBC # FLD: 13.9 %
SODIUM SERPL-SCNC: 144 MMOL/L
URATE SERPL-MCNC: 6.5 MG/DL
WBC # FLD AUTO: 7.18 K/UL

## 2022-01-14 ENCOUNTER — APPOINTMENT (OUTPATIENT)
Dept: HEMATOLOGY ONCOLOGY | Facility: CLINIC | Age: 67
End: 2022-01-14
Payer: MEDICARE

## 2022-01-14 ENCOUNTER — OUTPATIENT (OUTPATIENT)
Dept: OUTPATIENT SERVICES | Facility: HOSPITAL | Age: 67
LOS: 1 days | Discharge: HOME | End: 2022-01-14

## 2022-01-14 VITALS
OXYGEN SATURATION: 97 % | HEART RATE: 84 BPM | WEIGHT: 242 LBS | BODY MASS INDEX: 32.82 KG/M2 | SYSTOLIC BLOOD PRESSURE: 130 MMHG | DIASTOLIC BLOOD PRESSURE: 70 MMHG

## 2022-01-14 DIAGNOSIS — Z98.890 OTHER SPECIFIED POSTPROCEDURAL STATES: Chronic | ICD-10-CM

## 2022-01-14 DIAGNOSIS — C83.10 MANTLE CELL LYMPHOMA, UNSPECIFIED SITE: ICD-10-CM

## 2022-01-14 PROCEDURE — 85025 COMPLETE CBC W/AUTO DIFF WBC: CPT | Mod: GC

## 2022-01-14 PROCEDURE — 99214 OFFICE O/P EST MOD 30 MIN: CPT | Mod: GC

## 2022-01-17 ENCOUNTER — OUTPATIENT (OUTPATIENT)
Dept: OUTPATIENT SERVICES | Facility: HOSPITAL | Age: 67
LOS: 1 days | End: 2022-01-17
Payer: COMMERCIAL

## 2022-01-17 ENCOUNTER — APPOINTMENT (OUTPATIENT)
Dept: INFUSION THERAPY | Facility: HOSPITAL | Age: 67
End: 2022-01-17

## 2022-01-17 ENCOUNTER — APPOINTMENT (OUTPATIENT)
Dept: CT IMAGING | Facility: IMAGING CENTER | Age: 67
End: 2022-01-17
Payer: MEDICARE

## 2022-01-17 DIAGNOSIS — Z98.890 OTHER SPECIFIED POSTPROCEDURAL STATES: Chronic | ICD-10-CM

## 2022-01-17 DIAGNOSIS — C85.10 UNSPECIFIED B-CELL LYMPHOMA, UNSPECIFIED SITE: ICD-10-CM

## 2022-01-17 PROCEDURE — 82565 ASSAY OF CREATININE: CPT

## 2022-01-17 PROCEDURE — 71275 CT ANGIOGRAPHY CHEST: CPT

## 2022-01-17 PROCEDURE — 71275 CT ANGIOGRAPHY CHEST: CPT | Mod: 26

## 2022-01-17 PROCEDURE — 70491 CT SOFT TISSUE NECK W/DYE: CPT | Mod: 26

## 2022-01-17 PROCEDURE — 74177 CT ABD & PELVIS W/CONTRAST: CPT | Mod: 26

## 2022-01-17 PROCEDURE — 70491 CT SOFT TISSUE NECK W/DYE: CPT

## 2022-01-17 PROCEDURE — 74177 CT ABD & PELVIS W/CONTRAST: CPT

## 2022-01-18 NOTE — REVIEW OF SYSTEMS
[Patient Intake Form Reviewed] : Patient intake form was reviewed [Joint Pain] : joint pain [Muscle Pain] : muscle pain [Swollen Glands] : swollen glands [Negative] : Allergic/Immunologic [de-identified] : occasional numbness in arms; has R>L hand tingling

## 2022-01-18 NOTE — PHYSICAL EXAM
[Fully active, able to carry on all pre-disease performance without restriction] : Status 0 - Fully active, able to carry on all pre-disease performance without restriction [Normal] : affect appropriate [de-identified] : right wrist possible ganglion cyst/bursitis; b/l upper extremity excoriation

## 2022-01-18 NOTE — RESULTS/DATA
[FreeTextEntry1] : CBC done 1/11/22\par WBC- 7.18\par ALC 1.11\par ANC 5.03\par Hgb 14.4\par Plts 290\par \par \par Biopsy result:\par 1.  Lymph node, right neck, sono-guided core biopsy:\par - CD5, CD10 negative lymphoma, favor Diffuse large B-cell\par lymphoma, non-Germinal center subtype.\par \par See note and description.\par \par Diagnostic note:  The current right neck lymph node is\par morphologically and immunophenotypically consistent with a CD5,\par CD10 negative lymphoma, favor diffuse large B-cell lymphoma, non-\par germinal center subtype. Additional studies, including molecular\par and cytogenetics/FISH are performed and will be reported in a\par comprehensive addendum.\par \par Dr. Orozco was urgently faxed report on 8/2/21.\par \par Microscopic description:   The histologic sections of the right\par neck lymph node biopsy shows fragmented lymphoid tissue with\par areas of increased atypical larger cells. The larger cells show\par increased nuclear to cytoplasmic ratio, irregular nuclear\par contours and some with prominent nucleoli. No necrosis is present\par and mitoses/apoptosis are frequent.\par \par Immunohistochemical stains for CD3, CD5, CD20, PAX5, CD10, BCL6,\par BCL2, CyclinD1, Ki67, CD23, CD21, CD43, CD79a, MUM1 stains\par performed on formalin fixed paraffin embedded tissue, block 1A.\par \par Neoplastic cells are:  the larger atypical cells\par Positive:  CD20, PAX-5, BCL-6, MUM1, BCL2, p53 and some scattered\par CD30 positive cells, KI-67% is high within the areas of the\par larger cells (70-80%), c-MYC\par Negative: CD3, CD5, CD10, CD23 and CD21 (in the areas of the\par larger cells), ROSE\par Other:  CD3 and CD5 are positive in T-cells\par \par Flow cytometry: Monotypic B-cells (33% of cells), positive for\par kappa, CD19, CD20, FMC-7; negative CD5, CD10, CD23. The findings\par are consistent with CD5 negative, CD10 negative B-cell\par lymphoproliferative disorder/lymphoma\par \par \par PET/CT 8/12/21\par IMPRESSION: Abnormal FDG-PET/CT scan.\par \par 1. FDG-avid right cervical lymphadenopathy corresponds to biopsy-proven lymphoma.\par \par 2. Small FDG-avid perigastric lymph node is indeterminate, possibly lymphoma.\par \par --- End of Report ---\par

## 2022-01-18 NOTE — ASSESSMENT
[FreeTextEntry1] : 66 year old male presenting for initial consultation for recent diagnosis of DLBCL, non-germinal center subtype. Patient had a sonoguided core biopsy of right lymph node of neck on 7/28/21 with pathology resulting in \par CD5, CD10 negative lymphoma, favor Diffuse large B-cell lymphoma, non-Germinal center subtype. Given the pathology results, he will go for an excisional biopsy next week. We discussed that we can start prednisone after the biopsy which will improve his symptoms. PET/CT demonstrated the known R cervical node (SUV 30s) and possibly a 0.9 cm perigastric LN (SUV 5).  PT under went excisional biopsy of right neck LN on 8/18. Path was consistent with DLBCL non-germinal center subtype. He is s/p 5 cycles R-CHOP on 11/22/21.\par \par #DLBCL non-germinal center subtype\par -Pt is s/p Cycle 1 R-CHOP on 8/30/21 \par -s/p C2  on 9/20/21, C3 on 10/11/21, C4 on 11/1/21, C5 11/22/21\par -PET/CT 10/2/2021:shows response to current regimen\par - Repeat CT scan done 11/19/21  which show Posttreatment changes to multiple right cervical nodes. No pathologically enlarged lymph nodes currently. Small scattered bilateral ground glass opacities, which may be infectious or inflammatory in etiology. No suspicious lymphadenopathy. \par -Was due for C6 on 12/13/2021 but was admitted to hospital for CAP requiring antibiotics and O2\par -will defer treatment with C6 given symptoms and treatment delay \par -Plan for post treatment CT scans - due 1/17/22\par -RV in 1 month\par - Cont benadryl or hydroxyzine PRN\par \par # CAP\par - D/c from hospital 12/13/21\par - Improved post antibiotics; no longer requiring oxygen \par - Cont outpatient /fup with pulm- plan for repeat CT in Jan 2022 per pulm\par \par #Hx Carpal Tunnel syndrome\par -suspect joint pain related to this and not to lymphoma/chemotherapy \par -will send referral to hand surgery\par \par Follow up in 3 months\par \par \par

## 2022-01-18 NOTE — HISTORY OF PRESENT ILLNESS
[Treatment Protocol] : Treatment Protocol [de-identified] : Mr. Gregory Mercado is a 66 year old man with PMHx of Us esophagus, history of two knee replacements, hip replacements, neck surgery, arm surgery, and lower back surgery, He takes baclofen, nabumetone, naproxen, and sometimes Tylenol extra strength for pain. He also take omeprazole. Patient presents today for initial consultation for recent diagnosis of DLBCL, non-germinal center subtype. Patient had a sonoguided core biopsy of right lymph node of neck on 7/28/21 with pathology resulting in \par CD5, CD10 negative lymphoma, favor Diffuse large B-cell lymphoma, non-Germinal center subtype.  \par \par Patient reports he had enlarged right neck mass that began growing 1 month ago. He reported this to his PCP, who proceeded the patient to have sono guide core biopsy. \par He is a retired  and is now a full time .\par \par Started R-CHOP 8/30/21. PET/CT on 10/2/2021- shows response to therapy. Pt had CT scans on neck/abd/pelvis done 11/19/21 which show Posttreatment changes to multiple right cervical nodes. No pathologically enlarged lymph nodes currently. Small scattered bilateral groundglass opacities, which may be infectious or inflammatory in etiology. No suspicious lymphadenopathy. He received Cycle 5 of R-CHOP chemotherapy on 1/122/21. On 12/3/21 developed new onset chest pain and hortness of breath. Was recommended to go to the ER, did not want to go, attempted to arrange outpatient CT PE which was unable to be done. he was admitted from 12/6/21-12/11/21 for new onset pleuritic chest pain and shortness of breath, found to have organizing pneumonia on CT imaging, improved with antibiotics. He is now off supplemental O2.  [FreeTextEntry1] : R-CHOP: 8/30/21- 11/22/21 (5 cycles) [de-identified] : 1/14/22: Pt s/p cycle 5 of R-CHOP and tolerated treatment on 11/22/21. Cycle 6 held due to CAP. \par Pt has noticed new whole body itching, worse at night X 3 weeks. His wife has been using a new laundry softener for the pat month, no other new soaps/lotions/perfumes/foods. Benadryl helps with the itch. Denies fever/chills, No drenching night sweats. No CP/SOB. No lower extremity edema. Appetite is okay. Pt is gaining weight. No abd pain. No diarrhea/constipation. No hematuria, dysuria. No recent/recurrent infections. Energy levels have improved. Denies numbness/tingling of hands or feet. No mouth sores.

## 2022-01-24 ENCOUNTER — APPOINTMENT (OUTPATIENT)
Dept: HEMATOLOGY ONCOLOGY | Facility: CLINIC | Age: 67
End: 2022-01-24

## 2022-02-02 ENCOUNTER — NON-APPOINTMENT (OUTPATIENT)
Age: 67
End: 2022-02-02

## 2022-02-02 ENCOUNTER — APPOINTMENT (OUTPATIENT)
Dept: ORTHOPEDIC SURGERY | Facility: CLINIC | Age: 67
End: 2022-02-02
Payer: MEDICARE

## 2022-02-02 VITALS
HEIGHT: 72 IN | DIASTOLIC BLOOD PRESSURE: 80 MMHG | SYSTOLIC BLOOD PRESSURE: 128 MMHG | HEART RATE: 72 BPM | OXYGEN SATURATION: 97 % | BODY MASS INDEX: 32.78 KG/M2 | WEIGHT: 242 LBS

## 2022-02-02 PROCEDURE — 20600 DRAIN/INJ JOINT/BURSA W/O US: CPT | Mod: RT

## 2022-02-02 PROCEDURE — 99204 OFFICE O/P NEW MOD 45 MIN: CPT | Mod: 25

## 2022-02-10 NOTE — REASON FOR VISIT
[Follow-Up - From Hospitalization] : a follow-up visit after a recent hospitalization [Abnormal CXR/ Chest CT] : an abnormal CXR/ chest CT [Pneumonia] : pneumonia

## 2022-02-11 ENCOUNTER — APPOINTMENT (OUTPATIENT)
Dept: PULMONOLOGY | Facility: CLINIC | Age: 67
End: 2022-02-11
Payer: MEDICARE

## 2022-02-11 VITALS
HEIGHT: 72 IN | RESPIRATION RATE: 16 BRPM | OXYGEN SATURATION: 97 % | TEMPERATURE: 97.5 F | BODY MASS INDEX: 33.18 KG/M2 | WEIGHT: 245 LBS | HEART RATE: 79 BPM | SYSTOLIC BLOOD PRESSURE: 137 MMHG | DIASTOLIC BLOOD PRESSURE: 77 MMHG

## 2022-02-11 DIAGNOSIS — R93.89 ABNORMAL FINDINGS ON DIAGNOSTIC IMAGING OF OTHER SPECIFIED BODY STRUCTURES: ICD-10-CM

## 2022-02-11 PROCEDURE — 99214 OFFICE O/P EST MOD 30 MIN: CPT | Mod: 25

## 2022-02-11 PROCEDURE — 90670 PCV13 VACCINE IM: CPT

## 2022-02-11 PROCEDURE — G0009: CPT

## 2022-02-11 NOTE — PHYSICAL EXAM
[No Acute Distress] : no acute distress [Well Nourished] : well nourished [Well Developed] : well developed [Normal Oropharynx] : normal oropharynx [Normal Appearance] : normal appearance [Supple] : supple [No Neck Mass] : no neck mass [No JVD] : no jvd [Normal Rate/Rhythm] : normal rate/rhythm [Normal Pulses] : normal pulses [Normal S1, S2] : normal s1, s2 [No Murmurs] : no murmurs [No Resp Distress] : no resp distress [No Acc Muscle Use] : no acc muscle use [Clear to Auscultation Bilaterally] : clear to auscultation bilaterally [Benign] : benign [Not Tender] : not tender [Soft] : soft [Normal Gait] : normal gait [No Clubbing] : no clubbing [No Cyanosis] : no cyanosis [No Edema] : no edema [FROM] : FROM [Normal Color/ Pigmentation] : normal color/ pigmentation [No Focal Deficits] : no focal deficits [Cranial Nerves Intact] : cranial nerves intact [No Motor Deficits] : no motor deficits [Oriented x3] : oriented x3 [Normal Affect] : normal affect [TextBox_2] : appears well, no tachypnea [TextBox_68] : no wheezing

## 2022-02-11 NOTE — HISTORY OF PRESENT ILLNESS
[TextBox_4] : Mr. Mercado is a 66 year-old male with a history of B-cell lymphoma s/p 5 cycles of R-CHOP (last in Nov 2021) who presents for follow-up. He was recently hospitalized at [facility] in Dec 2021 for pleuritic chest pain and cough. He was found on CT Chest to have peripheral lower lobe-predominant ill-defined groundglass densities bilaterally, possibly due to infection (e.g., viral or atypical bacterial) vs. organizing pneumonia. During hospitalization he was afebrile and hemodynamically stable. Fevers resolved with antibiotics. No hypoxemia on room air with SpO2 98%. There was evidence of nocturnal desaturation while sleeping, possibly due to OLEGARIO. He was discharged with home oxygen.\par \par Labs during hospitalization initially with bandemia (16%) that resolved. Blood and urine cultures negative. Sputum culture with normal respiratory nicolasa. MRSA, urine legionella, and RVP negative. He was initially treated with vancomycin and zosyn. Vanc was discontinued when MRSA returned negative. He completed a 7-day course of antibiotics for pneumonia. It is possible that radiographic findings represent organizing pneumonia due to rituximab or cyclophosphamide. His R-CHOP course in Dec was held due to worry for drug-induced pneumonitis.\par \par Repeat CT chest in January 2022 with significant improvement in bilateral lower lobe peripheral groundglass opacities. No pleural effusion. Stable small mediastinal lymph nodes. \par \par Currently he denies any dyspnea, cough, or wheezing. Denies any sputum production. No fevers or chills. Pleuritic chest pain has resolved. Oxygen saturation has remained normal and he is inquiring how to give back the oxygen machine. Oxygen levels while sleeping are now improved to 95-96%. He used oxygen at night the first few nights after leaving the hospital, but now he is not requiring given resolved nocturnal oxygen desaturation (wife checks while sleeping). He never started taking albuterol-ipratropium nebs or inhaled steroid after leaving the hospital. Oxygen saturation is currently 98% on room air at rest. Bronchoscopy held given improved radiographic imaging and resolved symptoms.

## 2022-02-11 NOTE — ASSESSMENT
[FreeTextEntry1] : Mr. Mercado is a 66 year-old male with a history of B-cell lymphoma s/p 5 cycles of R-CHOP (last in Nov 2021) who presents for follow-up after recent hospitalization for pleuritic chest pain, fevers, and cough, found on CT Chest to have peripheral lower lobe-predominant ill-defined groundglass densities bilaterally, possibly due to infection (e.g., viral or atypical bacterial) vs. organizing pneumonia. Symptoms improved with antibiotics. He had no hypoxemia during the day, but was noted to have nocturnal hypoxemia requiring home oxygen, now resolved.\par \par 1. Community acquired pneumonia:\par - Labs during hospitalization initially with bandemia (16%) that resolved. Blood and urine cultures negative. Sputum culture with normal respiratory nicolasa. MRSA, urine legionella, and RVP negative\par - He received Vanc and Zosyn during hospitalization, vanc was stopped when MRSA & sputum culture showed no MRSA. He then completed a 7-day course of antibiotics with Augmentin\par - Currently with resolution of symptoms\par - He did not start taking Asmanex or Duonebs as wheezing and cough resolved\par - Did not require steroids. I doubt that findings represent organizing pneumonia due to rituximab or cyclophosphamide\par - SpO2 currently 97% on room air\par - Repeat CT chest in January 2022 with significant improvement in bilateral lower lobe peripheral groundglass opacities. No pleural effusion. Stable small mediastinal lymph nodes.\par - Does not require bronchoscopy at this time given resolved symptoms and CT with significantly improved bilateral lower lobe peripheral GGO's\par \par 2. Diffuse Large B Cell Lymphoma:\par - Completed C5 of R-CHOP\par - Current cycle on hold given recent pneumonia and possible drug pneumonitis\par - Follow-up with Dr. Mayen, planned for surveillance imaging in April\par \par 3. Nocturnal desaturation:\par - He reports a history of OLEGARIO, but did not tolerate CPAP previously (about 20 years ago)\par - I asked him to go for repeat sleep study, he is requesting home sleep study\par - Follow-up after sleep study completed to discuss possible treatment options (CPAP vs. oral appliance)\par \par 4. HCM:\par - Received COVID-19 pfizer vaccine in April 2021, pending booster\par - Declining influenza currently\par - Administered [id number] in left shoulder today, needs [id number] in Feb 2023\par - Follow-up after sleep study

## 2022-02-14 ENCOUNTER — OUTPATIENT (OUTPATIENT)
Dept: OUTPATIENT SERVICES | Facility: HOSPITAL | Age: 67
LOS: 1 days | Discharge: HOME | End: 2022-02-14

## 2022-02-14 DIAGNOSIS — Z98.890 OTHER SPECIFIED POSTPROCEDURAL STATES: Chronic | ICD-10-CM

## 2022-02-14 DIAGNOSIS — C83.10 MANTLE CELL LYMPHOMA, UNSPECIFIED SITE: ICD-10-CM

## 2022-02-17 ENCOUNTER — APPOINTMENT (OUTPATIENT)
Dept: INFUSION THERAPY | Facility: HOSPITAL | Age: 67
End: 2022-02-17

## 2022-03-15 ENCOUNTER — OUTPATIENT (OUTPATIENT)
Dept: OUTPATIENT SERVICES | Facility: HOSPITAL | Age: 67
LOS: 1 days | Discharge: HOME | End: 2022-03-15

## 2022-03-15 DIAGNOSIS — Z98.890 OTHER SPECIFIED POSTPROCEDURAL STATES: Chronic | ICD-10-CM

## 2022-03-15 DIAGNOSIS — C83.10 MANTLE CELL LYMPHOMA, UNSPECIFIED SITE: ICD-10-CM

## 2022-03-17 ENCOUNTER — APPOINTMENT (OUTPATIENT)
Dept: INFUSION THERAPY | Facility: HOSPITAL | Age: 67
End: 2022-03-17

## 2022-04-21 ENCOUNTER — APPOINTMENT (OUTPATIENT)
Dept: HEMATOLOGY ONCOLOGY | Facility: CLINIC | Age: 67
End: 2022-04-21
Payer: MEDICARE

## 2022-04-21 VITALS
HEART RATE: 67 BPM | TEMPERATURE: 98.2 F | WEIGHT: 240 LBS | OXYGEN SATURATION: 97 % | BODY MASS INDEX: 32.55 KG/M2 | DIASTOLIC BLOOD PRESSURE: 74 MMHG | SYSTOLIC BLOOD PRESSURE: 118 MMHG

## 2022-04-21 PROCEDURE — 85025 COMPLETE CBC W/AUTO DIFF WBC: CPT | Mod: GC

## 2022-04-21 PROCEDURE — 99215 OFFICE O/P EST HI 40 MIN: CPT | Mod: GC,25

## 2022-04-21 NOTE — REASON FOR VISIT
[Follow-Up Visit] : a follow-up visit for [Lymphoma] : lymphoma [Family Member] : family member [Spouse] : spouse

## 2022-04-22 LAB
ALBUMIN SERPL ELPH-MCNC: 4.5 G/DL
ALP BLD-CCNC: 111 U/L
ALT SERPL-CCNC: 19 U/L
ANION GAP SERPL CALC-SCNC: 13 MMOL/L
AST SERPL-CCNC: 19 U/L
BILIRUB SERPL-MCNC: 0.3 MG/DL
BUN SERPL-MCNC: 11 MG/DL
CALCIUM SERPL-MCNC: 9.9 MG/DL
CHLORIDE SERPL-SCNC: 105 MMOL/L
CO2 SERPL-SCNC: 27 MMOL/L
CREAT SERPL-MCNC: 1.06 MG/DL
EGFR: 76 ML/MIN/1.73M2
GLUCOSE SERPL-MCNC: 97 MG/DL
INR PPP: 0.98 RATIO
LDH SERPL-CCNC: 159 U/L
MAGNESIUM SERPL-MCNC: 2.1 MG/DL
PHOSPHATE SERPL-MCNC: 3.3 MG/DL
POTASSIUM SERPL-SCNC: 4.5 MMOL/L
PROT SERPL-MCNC: 7.3 G/DL
PT BLD: 11.5 SEC
SODIUM SERPL-SCNC: 144 MMOL/L
URATE SERPL-MCNC: 6 MG/DL

## 2022-04-25 NOTE — RESULTS/DATA
[FreeTextEntry1] : CBC 4/21/22: \par WBC 5.5, ANC 3.91, ALC 1.04, Hgb 15.1, Plt 288.0\par \par CBC done 1/11/22\par WBC- 7.18\par ALC 1.11\par ANC 5.03\par Hgb 14.4\par Plts 290\par \par \par Biopsy result:\par 1.  Lymph node, right neck, sono-guided core biopsy:\par - CD5, CD10 negative lymphoma, favor Diffuse large B-cell\par lymphoma, non-Germinal center subtype.\par \par See note and description.\par \par Diagnostic note:  The current right neck lymph node is\par morphologically and immunophenotypically consistent with a CD5,\par CD10 negative lymphoma, favor diffuse large B-cell lymphoma, non-\par germinal center subtype. Additional studies, including molecular\par and cytogenetics/FISH are performed and will be reported in a\par comprehensive addendum.\par \par Dr. Orozco was urgently faxed report on 8/2/21.\par \par Microscopic description:   The histologic sections of the right\par neck lymph node biopsy shows fragmented lymphoid tissue with\par areas of increased atypical larger cells. The larger cells show\par increased nuclear to cytoplasmic ratio, irregular nuclear\par contours and some with prominent nucleoli. No necrosis is present\par and mitoses/apoptosis are frequent.\par \par Immunohistochemical stains for CD3, CD5, CD20, PAX5, CD10, BCL6,\par BCL2, CyclinD1, Ki67, CD23, CD21, CD43, CD79a, MUM1 stains\par performed on formalin fixed paraffin embedded tissue, block 1A.\par \par Neoplastic cells are:  the larger atypical cells\par Positive:  CD20, PAX-5, BCL-6, MUM1, BCL2, p53 and some scattered\par CD30 positive cells, KI-67% is high within the areas of the\par larger cells (70-80%), c-MYC\par Negative: CD3, CD5, CD10, CD23 and CD21 (in the areas of the\par larger cells), ROSE\par Other:  CD3 and CD5 are positive in T-cells\par \par Flow cytometry: Monotypic B-cells (33% of cells), positive for\par kappa, CD19, CD20, FMC-7; negative CD5, CD10, CD23. The findings\par are consistent with CD5 negative, CD10 negative B-cell\par lymphoproliferative disorder/lymphoma\par \par \par PET/CT 8/12/21\par IMPRESSION: Abnormal FDG-PET/CT scan.\par \par 1. FDG-avid right cervical lymphadenopathy corresponds to biopsy-proven lymphoma.\par \par 2. Small FDG-avid perigastric lymph node is indeterminate, possibly lymphoma.\par \par --- End of Report ---\par

## 2022-04-25 NOTE — PHYSICAL EXAM
[Fully active, able to carry on all pre-disease performance without restriction] : Status 0 - Fully active, able to carry on all pre-disease performance without restriction [Normal] : affect appropriate [de-identified] : right wrist possible ganglion cyst/bursitis; b/l upper extremity excoriation [de-identified] : Wearing right wrist splint

## 2022-04-25 NOTE — END OF VISIT
[] : Fellow [FreeTextEntry3] : Patient seen and case discussed with Dr Kohler- 66 yo male with history of Stage III DLBCL s/p 5 cycles R-CHOP (C6 held due to CAP with O2 requirement). Overall doing well, ok for carpal tunnel surgery from heme perspective. Continue active surveillance, will repeat CT scans  [Time Spent: ___ minutes] : I have spent [unfilled] minutes of time on the encounter.

## 2022-04-25 NOTE — ASSESSMENT
[FreeTextEntry1] : 66 year old male presenting for initial consultation for recent diagnosis of DLBCL, non-germinal center subtype. Patient had a sonoguided core biopsy of right lymph node of neck on 7/28/21 with pathology resulting in \par CD5, CD10 negative lymphoma, favor Diffuse large B-cell lymphoma, non-Germinal center subtype. Given the pathology results, he will go for an excisional biopsy next week. We discussed that we can start prednisone after the biopsy which will improve his symptoms. PET/CT demonstrated the known R cervical node (SUV 30s) and possibly a 0.9 cm perigastric LN (SUV 5).  PT under went excisional biopsy of right neck LN on 8/18. Path was consistent with DLBCL non-germinal center subtype. He is s/p 5 cycles R-CHOP on 11/22/21.\par \par #DLBCL non-germinal center subtype\par -Pt is s/p Cycle 1 R-CHOP on 8/30/21 \par -s/p C2  on 9/20/21, C3 on 10/11/21, C4 on 11/1/21, C5 11/22/21\par -PET/CT 10/2/2021:shows response to current regimen\par - Repeat CT scan done 11/19/21  which show Posttreatment changes to multiple right cervical nodes. No pathologically enlarged lymph nodes currently. Small scattered bilateral ground glass opacities, which may be infectious or inflammatory in etiology. No suspicious lymphadenopathy. \par -Was due for C6 on 12/13/2021 but was admitted to hospital for CAP requiring antibiotics and O2\par -Will defer treatment with C6 given symptoms and treatment delay; on visit on 4/21/22, continues to feel well, respiratory issues resolved\par - Plan for surveillance scans, last done in 1/17/22, will arrange for repeat CT in next 1-2 weeks\par -RV 3 months\par - Cont benadryl or hydroxyzine PRN\par \par #Hx Carpal Tunnel syndrome\par -suspect joint pain related to this and not to lymphoma/chemotherapy; referred to ortho hand&wrist\par -surgical intervention offered, deferred until mediport removal; will arrange for mediport removal and refer to ortho for repeat evaluation\par -outside labs reviewed, Rf negative, REILLY 1:80; discussed with patient that SLE is a clinical diagnosis, and referred back to PCP for further evaluation as needed; suspicion for SLE is low\par -PT/INR check today\par \par # CAP\par - D/c from hospital 12/13/21\par - Improved post antibiotics; no longer requiring oxygen; breathing is now back to normal as of 4/21/22\par - follow up with pulm PRN\par \par Repeat CT in 1-2 weeks\par Mediport removal\par Follow up in 3 months or sooner as needed\par \par Case discussed with Dr. Mayen.\par \par Scottie Kohler MD, MPH\par Hematology / Oncology Fellow, PGY7

## 2022-04-25 NOTE — HISTORY OF PRESENT ILLNESS
[Treatment Protocol] : Treatment Protocol [de-identified] : Mr. Gregory Mercado is a 66 year old man with PMHx of Us esophagus, history of two knee replacements, hip replacements, neck surgery, arm surgery, and lower back surgery, He takes baclofen, nabumetone, naproxen, and sometimes Tylenol extra strength for pain. He also take omeprazole. Patient presents today for initial consultation for recent diagnosis of DLBCL, non-germinal center subtype. Patient had a sonoguided core biopsy of right lymph node of neck on 7/28/21 with pathology resulting in \par CD5, CD10 negative lymphoma, favor Diffuse large B-cell lymphoma, non-Germinal center subtype.  \par \par Patient reports he had enlarged right neck mass that began growing 1 month ago. He reported this to his PCP, who proceeded the patient to have sono guide core biopsy. \par He is a retired  and is now a full time .\par \par Started R-CHOP 8/30/21. PET/CT on 10/2/2021- shows response to therapy. Pt had CT scans on neck/abd/pelvis done 11/19/21 which show Posttreatment changes to multiple right cervical nodes. No pathologically enlarged lymph nodes currently. Small scattered bilateral groundglass opacities, which may be infectious or inflammatory in etiology. No suspicious lymphadenopathy. He received Cycle 5 of R-CHOP chemotherapy on 1/122/21. On 12/3/21 developed new onset chest pain and shortness of breath. Was recommended to go to the ER, did not want to go, attempted to arrange outpatient CT PE which was unable to be done. he was admitted from 12/6/21-12/11/21 for new onset pleuritic chest pain and shortness of breath, found to have organizing pneumonia on CT imaging, improved with antibiotics. He is now off supplemental O2. Decision was made to hold further therapy in the setting of PNA.  [FreeTextEntry1] : R-CHOP: 8/30/21- 11/22/21 (5 cycles) [de-identified] : 1/14/22: Pt s/p cycle 5 of R-CHOP and tolerated treatment on 11/22/21. Cycle 6 held due to CAP. \par Pt has noticed new whole body itching, worse at night X 3 weeks. His wife has been using a new laundry softener for the pat month, no other new soaps/lotions/perfumes/foods. Benadryl helps with the itch. Denies fever/chills, No drenching night sweats. No CP/SOB. No lower extremity edema. Appetite is okay. Pt is gaining weight. No abd pain. No diarrhea/constipation. No hematuria, dysuria. No recent/recurrent infections. Energy levels have improved. Denies numbness/tingling of hands or feet. No mouth sores.\par \par 4/21/22: Mr. Mercado presents for a follow up visit. s/p cycle 5 of R-CHOP, tolerated treatment on 11/22/21. Cycle 6 held due to CAP. Since he was last seen in Jan 2022, he reports no new symptoms, including appetite changes, lymphadenopathy, fevers, chills, night sweats. He was seen by orthopedics for his right wrist swelling; surgical options were discussed, but decision was made to wait until Mediport was removed before proceeding with surgery. He reports that pain is stable. Seen by his PMD, blood work done to r/o autoimmune arthritis; he was concerned since his REILLY was 1:80. Reviewed blood work with him this visit. He is not currently taking any meds aside from Tylenol for his wrist pain and omeprazole for gastric reflux.

## 2022-04-25 NOTE — REVIEW OF SYSTEMS
[Patient Intake Form Reviewed] : Patient intake form was reviewed [Joint Pain] : joint pain [Muscle Pain] : muscle pain [Swollen Glands] : swollen glands [Negative] : Allergic/Immunologic [de-identified] : occasional numbness in arms; has R>L hand tingling, stable

## 2022-04-26 ENCOUNTER — RESULT REVIEW (OUTPATIENT)
Age: 67
End: 2022-04-26

## 2022-04-30 ENCOUNTER — OUTPATIENT (OUTPATIENT)
Dept: OUTPATIENT SERVICES | Facility: HOSPITAL | Age: 67
LOS: 1 days | End: 2022-04-30
Payer: COMMERCIAL

## 2022-04-30 ENCOUNTER — APPOINTMENT (OUTPATIENT)
Dept: CT IMAGING | Facility: IMAGING CENTER | Age: 67
End: 2022-04-30
Payer: MEDICARE

## 2022-04-30 DIAGNOSIS — Z98.890 OTHER SPECIFIED POSTPROCEDURAL STATES: Chronic | ICD-10-CM

## 2022-04-30 DIAGNOSIS — C85.10 UNSPECIFIED B-CELL LYMPHOMA, UNSPECIFIED SITE: ICD-10-CM

## 2022-04-30 PROCEDURE — 71260 CT THORAX DX C+: CPT

## 2022-04-30 PROCEDURE — 74177 CT ABD & PELVIS W/CONTRAST: CPT

## 2022-04-30 PROCEDURE — 74177 CT ABD & PELVIS W/CONTRAST: CPT | Mod: 26

## 2022-04-30 PROCEDURE — 70491 CT SOFT TISSUE NECK W/DYE: CPT

## 2022-04-30 PROCEDURE — 70491 CT SOFT TISSUE NECK W/DYE: CPT | Mod: 26

## 2022-04-30 PROCEDURE — 71260 CT THORAX DX C+: CPT | Mod: 26

## 2022-05-06 ENCOUNTER — OUTPATIENT (OUTPATIENT)
Dept: OUTPATIENT SERVICES | Facility: HOSPITAL | Age: 67
LOS: 1 days | Discharge: HOME | End: 2022-05-06

## 2022-05-06 DIAGNOSIS — Z98.890 OTHER SPECIFIED POSTPROCEDURAL STATES: Chronic | ICD-10-CM

## 2022-05-06 DIAGNOSIS — C83.10 MANTLE CELL LYMPHOMA, UNSPECIFIED SITE: ICD-10-CM

## 2022-05-09 ENCOUNTER — APPOINTMENT (OUTPATIENT)
Dept: INFUSION THERAPY | Facility: HOSPITAL | Age: 67
End: 2022-05-09

## 2022-05-28 ENCOUNTER — OUTPATIENT (OUTPATIENT)
Dept: OUTPATIENT SERVICES | Facility: HOSPITAL | Age: 67
LOS: 1 days | End: 2022-05-28
Payer: COMMERCIAL

## 2022-05-28 ENCOUNTER — APPOINTMENT (OUTPATIENT)
Dept: MRI IMAGING | Facility: IMAGING CENTER | Age: 67
End: 2022-05-28
Payer: MEDICARE

## 2022-05-28 DIAGNOSIS — Z98.890 OTHER SPECIFIED POSTPROCEDURAL STATES: Chronic | ICD-10-CM

## 2022-05-28 DIAGNOSIS — C64.9 MALIGNANT NEOPLASM OF UNSPECIFIED KIDNEY, EXCEPT RENAL PELVIS: ICD-10-CM

## 2022-05-28 PROCEDURE — 73221 MRI JOINT UPR EXTREM W/O DYE: CPT

## 2022-05-28 PROCEDURE — 73221 MRI JOINT UPR EXTREM W/O DYE: CPT | Mod: 26,RT

## 2022-06-06 ENCOUNTER — APPOINTMENT (OUTPATIENT)
Dept: ORTHOPEDIC SURGERY | Facility: CLINIC | Age: 67
End: 2022-06-06

## 2022-06-06 PROCEDURE — 99214 OFFICE O/P EST MOD 30 MIN: CPT | Mod: 25,57

## 2022-06-06 PROCEDURE — 20550 NJX 1 TENDON SHEATH/LIGAMENT: CPT | Mod: 59,RT

## 2022-07-14 ENCOUNTER — OUTPATIENT (OUTPATIENT)
Dept: OUTPATIENT SERVICES | Facility: HOSPITAL | Age: 67
LOS: 1 days | End: 2022-07-14
Payer: COMMERCIAL

## 2022-07-14 VITALS
HEART RATE: 65 BPM | RESPIRATION RATE: 14 BRPM | SYSTOLIC BLOOD PRESSURE: 127 MMHG | TEMPERATURE: 98 F | DIASTOLIC BLOOD PRESSURE: 92 MMHG | OXYGEN SATURATION: 97 % | HEIGHT: 72 IN | WEIGHT: 200.62 LBS

## 2022-07-14 DIAGNOSIS — M19.031 PRIMARY OSTEOARTHRITIS, RIGHT WRIST: ICD-10-CM

## 2022-07-14 DIAGNOSIS — Z98.890 OTHER SPECIFIED POSTPROCEDURAL STATES: Chronic | ICD-10-CM

## 2022-07-14 DIAGNOSIS — Z01.818 ENCOUNTER FOR OTHER PREPROCEDURAL EXAMINATION: ICD-10-CM

## 2022-07-14 LAB
ANION GAP SERPL CALC-SCNC: 13 MMOL/L — SIGNIFICANT CHANGE UP (ref 5–17)
BUN SERPL-MCNC: 20 MG/DL — SIGNIFICANT CHANGE UP (ref 7–23)
CALCIUM SERPL-MCNC: 9.2 MG/DL — SIGNIFICANT CHANGE UP (ref 8.4–10.5)
CHLORIDE SERPL-SCNC: 104 MMOL/L — SIGNIFICANT CHANGE UP (ref 96–108)
CO2 SERPL-SCNC: 23 MMOL/L — SIGNIFICANT CHANGE UP (ref 22–31)
CREAT SERPL-MCNC: 1.08 MG/DL — SIGNIFICANT CHANGE UP (ref 0.5–1.3)
EGFR: 75 ML/MIN/1.73M2 — SIGNIFICANT CHANGE UP
GLUCOSE SERPL-MCNC: 89 MG/DL — SIGNIFICANT CHANGE UP (ref 70–99)
HCT VFR BLD CALC: 44.5 % — SIGNIFICANT CHANGE UP (ref 39–50)
HGB BLD-MCNC: 14.4 G/DL — SIGNIFICANT CHANGE UP (ref 13–17)
MCHC RBC-ENTMCNC: 28.1 PG — SIGNIFICANT CHANGE UP (ref 27–34)
MCHC RBC-ENTMCNC: 32.4 GM/DL — SIGNIFICANT CHANGE UP (ref 32–36)
MCV RBC AUTO: 86.7 FL — SIGNIFICANT CHANGE UP (ref 80–100)
NRBC # BLD: 0 /100 WBCS — SIGNIFICANT CHANGE UP (ref 0–0)
PLATELET # BLD AUTO: 287 K/UL — SIGNIFICANT CHANGE UP (ref 150–400)
POTASSIUM SERPL-MCNC: 4 MMOL/L — SIGNIFICANT CHANGE UP (ref 3.5–5.3)
POTASSIUM SERPL-SCNC: 4 MMOL/L — SIGNIFICANT CHANGE UP (ref 3.5–5.3)
RBC # BLD: 5.13 M/UL — SIGNIFICANT CHANGE UP (ref 4.2–5.8)
RBC # FLD: 12.9 % — SIGNIFICANT CHANGE UP (ref 10.3–14.5)
SODIUM SERPL-SCNC: 140 MMOL/L — SIGNIFICANT CHANGE UP (ref 135–145)
WBC # BLD: 8.45 K/UL — SIGNIFICANT CHANGE UP (ref 3.8–10.5)
WBC # FLD AUTO: 8.45 K/UL — SIGNIFICANT CHANGE UP (ref 3.8–10.5)

## 2022-07-14 PROCEDURE — 85027 COMPLETE CBC AUTOMATED: CPT

## 2022-07-14 PROCEDURE — G0463: CPT

## 2022-07-14 PROCEDURE — 80048 BASIC METABOLIC PNL TOTAL CA: CPT

## 2022-07-14 RX ORDER — SODIUM CHLORIDE 9 MG/ML
1000 INJECTION, SOLUTION INTRAVENOUS
Refills: 0 | Status: DISCONTINUED | OUTPATIENT
Start: 2022-08-04 | End: 2022-08-19

## 2022-07-14 RX ORDER — ONDANSETRON 8 MG/1
1 TABLET, FILM COATED ORAL
Qty: 0 | Refills: 0 | DISCHARGE

## 2022-07-14 RX ORDER — SODIUM CHLORIDE 9 MG/ML
3 INJECTION INTRAMUSCULAR; INTRAVENOUS; SUBCUTANEOUS EVERY 8 HOURS
Refills: 0 | Status: DISCONTINUED | OUTPATIENT
Start: 2022-08-04 | End: 2022-08-19

## 2022-07-14 NOTE — H&P PST ADULT - NSICDXPASTSURGICALHX_GEN_ALL_CORE_FT
PAST SURGICAL HISTORY:  H/O arthroscopy left shoulder    H/O cervical spine surgery metal hardware    History of back surgery lumbar region- details unknown    S/P Appendectomy     S/P TKR (Total Knee Replacement) biltaeral 16 years ago    Total Hip Replacement bilateral 15 years ago

## 2022-07-14 NOTE — H&P PST ADULT - MUSCULOSKELETAL
normal gait/strength 5/5 bilateral upper extremities/strength 5/5 bilateral lower extremities details…

## 2022-07-14 NOTE — H&P PST ADULT - PROBLEM SELECTOR PLAN 1
Right wrist proximal row carpectomy on 8/4/22  PST labs pending  AC: None  Medical evaluation pending,  Hem/Onc clearance in chart from 4/2022  CT 5/2022 in chart- shows resolution of GGO from 12/2021, Pulm note in chart  Echo in chart   Covid test 8/1/22

## 2022-07-14 NOTE — H&P PST ADULT - HISTORY OF PRESENT ILLNESS
66 y/o M with PMHx of GERD, arthritis, HLD (diet controlled), Hx of OLEGARIO (no need for machine for more than 20 years now"), carpal tunnel syndrome and Large B-cell lymphoma diagnosed 7/2021, s/p 5 cycles R-CHOP on 11/22/21 (Hem/Onc clearance in chart). Hospitalization at [facility] 12/2021 for PNA s/p chemo (CT chest 5/2022 in chart with resolution of GGO and Pulm note).  Presents to PST c/o right wrist pain for many years and now worsening. Scheduled for Right wrist proximal row carpectomy on 8/4/22.      Denies Hx of Covid-19 Infx.  Covid swab on 8/1/22

## 2022-07-28 ENCOUNTER — APPOINTMENT (OUTPATIENT)
Dept: HEMATOLOGY ONCOLOGY | Facility: CLINIC | Age: 67
End: 2022-07-28

## 2022-07-28 VITALS
HEART RATE: 59 BPM | BODY MASS INDEX: 32.41 KG/M2 | WEIGHT: 239 LBS | DIASTOLIC BLOOD PRESSURE: 68 MMHG | RESPIRATION RATE: 14 BRPM | SYSTOLIC BLOOD PRESSURE: 128 MMHG | OXYGEN SATURATION: 99 %

## 2022-07-28 PROCEDURE — 99214 OFFICE O/P EST MOD 30 MIN: CPT | Mod: GC,25

## 2022-07-28 PROCEDURE — 85025 COMPLETE CBC W/AUTO DIFF WBC: CPT | Mod: GC

## 2022-07-28 NOTE — REVIEW OF SYSTEMS
[Patient Intake Form Reviewed] : Patient intake form was reviewed [Joint Pain] : joint pain [Muscle Pain] : muscle pain [Swollen Glands] : swollen glands [Negative] : Allergic/Immunologic [de-identified] : occasional numbness in arms; has R>L hand tingling, stable

## 2022-07-28 NOTE — PHYSICAL EXAM
[Fully active, able to carry on all pre-disease performance without restriction] : Status 0 - Fully active, able to carry on all pre-disease performance without restriction [Normal] : affect appropriate [de-identified] : Wearing right wrist splint [de-identified] : right wrist possible ganglion cyst/bursitis; b/l upper extremity excoriation

## 2022-07-28 NOTE — HISTORY OF PRESENT ILLNESS
[Treatment Protocol] : Treatment Protocol [de-identified] : Mr. Gregory Mercado is a 66 year old man with PMHx of Us esophagus, history of two knee replacements, hip replacements, neck surgery, arm surgery, and lower back surgery, He takes baclofen, nabumetone, naproxen, and sometimes Tylenol extra strength for pain. He also take omeprazole. Patient presents today for initial consultation for recent diagnosis of DLBCL, non-germinal center subtype. Patient had a sonoguided core biopsy of right lymph node of neck on 7/28/21 with pathology resulting in \par CD5, CD10 negative lymphoma, favor Diffuse large B-cell lymphoma, non-Germinal center subtype.  \par \par Patient reports he had enlarged right neck mass that began growing 1 month ago. He reported this to his PCP, who proceeded the patient to have sono guide core biopsy. \par He is a retired  and is now a full time .\par \par Started R-CHOP 8/30/21. PET/CT on 10/2/2021- shows response to therapy. Pt had CT scans on neck/abd/pelvis done 11/19/21 which show Posttreatment changes to multiple right cervical nodes. No pathologically enlarged lymph nodes currently. Small scattered bilateral groundglass opacities, which may be infectious or inflammatory in etiology. No suspicious lymphadenopathy. He received Cycle 5 of R-CHOP chemotherapy on 1/122/21. On 12/3/21 developed new onset chest pain and shortness of breath. Was recommended to go to the ER, did not want to go, attempted to arrange outpatient CT PE which was unable to be done. he was admitted from 12/6/21-12/11/21 for new onset pleuritic chest pain and shortness of breath, found to have organizing pneumonia on CT imaging, improved with antibiotics. He is now off supplemental O2. Decision was made to hold further therapy in the setting of PNA.  [FreeTextEntry1] : R-CHOP: 8/30/21- 11/22/21 (5 cycles) [de-identified] : 1/14/22: Pt s/p cycle 5 of R-CHOP and tolerated treatment on 11/22/21. Cycle 6 held due to CAP. \par Pt has noticed new whole body itching, worse at night X 3 weeks. His wife has been using a new laundry softener for the pat month, no other new soaps/lotions/perfumes/foods. Benadryl helps with the itch. Denies fever/chills, No drenching night sweats. No CP/SOB. No lower extremity edema. Appetite is okay. Pt is gaining weight. No abd pain. No diarrhea/constipation. No hematuria, dysuria. No recent/recurrent infections. Energy levels have improved. Denies numbness/tingling of hands or feet. No mouth sores.\par \par 4/21/22: Mr. Mercado presents for a follow up visit. s/p cycle 5 of R-CHOP, tolerated treatment on 11/22/21. Cycle 6 held due to CAP. Since he was last seen in Jan 2022, he reports no new symptoms, including appetite changes, lymphadenopathy, fevers, chills, night sweats. He was seen by orthopedics for his right wrist swelling; surgical options were discussed, but decision was made to wait until Mediport was removed before proceeding with surgery. He reports that pain is stable. Seen by his PMD, blood work done to r/o autoimmune arthritis; he was concerned since his REILLY was 1:80. Reviewed blood work with him this visit. He is not currently taking any meds aside from Tylenol for his wrist pain and omeprazole for gastric reflux.\par \par 7/28/22: Since his last visit he continues to feel well. No new lymphadenopathy, appetite changes, fevers, chills or night sweats. He is going to have surgery on his R hand with Dr. Ashford on August 4. No recent infections. He has noticed that he loses his voice when he talks a lot for the last month but overall it isn't bothersome.

## 2022-07-28 NOTE — ASSESSMENT
[FreeTextEntry1] : 66 year old male presenting for initial consultation for recent diagnosis of DLBCL, non-germinal center subtype. Patient had a sonoguided core biopsy of right lymph node of neck on 7/28/21 with pathology resulting in \par CD5, CD10 negative lymphoma, favor Diffuse large B-cell lymphoma, non-Germinal center subtype. Given the pathology results, he will go for an excisional biopsy next week. We discussed that we can start prednisone after the biopsy which will improve his symptoms. PET/CT demonstrated the known R cervical node (SUV 30s) and possibly a 0.9 cm perigastric LN (SUV 5).  PT under went excisional biopsy of right neck LN on 8/18. Path was consistent with DLBCL non-germinal center subtype. He is s/p 5 cycles R-CHOP on 11/22/21.\par \par #DLBCL non-germinal center subtype\par -Pt is s/p Cycle 1 R-CHOP on 8/30/21 \par -s/p C2  on 9/20/21, C3 on 10/11/21, C4 on 11/1/21, C5 11/22/21\par -PET/CT 10/2/2021:shows response to current regimen\par - Repeat CT scan done 11/19/21  which show Posttreatment changes to multiple right cervical nodes. No pathologically enlarged lymph nodes currently. Small scattered bilateral ground glass opacities, which may be infectious or inflammatory in etiology. No suspicious lymphadenopathy. \par -Was due for C6 on 12/13/2021 but was admitted to hospital for CAP requiring antibiotics and O2\par -Will defer treatment with C6 given symptoms and treatment delay; on visit on 4/21/22, continues to feel well, respiratory issues resolved\par - Plan for surveillance scans in the next 1-3 weeks \par -continue port flush 1-3 months \par -RV 3 months\par - Cont benadryl or hydroxyzine PRN\par \par #Hx Carpal Tunnel syndrome\par -suspect joint pain related to this and not to lymphoma/chemotherapy; referred to ortho hand&wrist\par -to have procedure Aug 4 \par -outside labs reviewed, Rf negative, REILLY 1:80; discussed with patient that SLE is a clinical diagnosis, and referred back to PCP for further evaluation as needed; suspicion for SLE is low\par \par \par # CAP\par - D/c from hospital 12/13/21\par - Improved post antibiotics; no longer requiring oxygen; breathing is now back to normal as of 4/21/22\par - follow up with pulm PRN\par \par #Hoarseness\par -follow up CT neck results\par -can follow up with Dr. Ulloa

## 2022-07-29 ENCOUNTER — OUTPATIENT (OUTPATIENT)
Dept: OUTPATIENT SERVICES | Facility: HOSPITAL | Age: 67
LOS: 1 days | Discharge: HOME | End: 2022-07-29

## 2022-07-29 DIAGNOSIS — Z98.890 OTHER SPECIFIED POSTPROCEDURAL STATES: Chronic | ICD-10-CM

## 2022-07-29 DIAGNOSIS — C83.10 MANTLE CELL LYMPHOMA, UNSPECIFIED SITE: ICD-10-CM

## 2022-07-29 LAB
ALBUMIN SERPL ELPH-MCNC: 4.5 G/DL
ALP BLD-CCNC: 98 U/L
ALT SERPL-CCNC: 16 U/L
ANION GAP SERPL CALC-SCNC: 11 MMOL/L
AST SERPL-CCNC: 15 U/L
BILIRUB SERPL-MCNC: 0.4 MG/DL
BUN SERPL-MCNC: 12 MG/DL
CALCIUM SERPL-MCNC: 9.4 MG/DL
CHLORIDE SERPL-SCNC: 105 MMOL/L
CO2 SERPL-SCNC: 26 MMOL/L
CREAT SERPL-MCNC: 1.15 MG/DL
EGFR: 69 ML/MIN/1.73M2
GLUCOSE SERPL-MCNC: 91 MG/DL
LDH SERPL-CCNC: 159 U/L
MAGNESIUM SERPL-MCNC: 2 MG/DL
PHOSPHATE SERPL-MCNC: 3.1 MG/DL
POTASSIUM SERPL-SCNC: 4.7 MMOL/L
PROT SERPL-MCNC: 7.2 G/DL
SODIUM SERPL-SCNC: 143 MMOL/L
URATE SERPL-MCNC: 6.7 MG/DL

## 2022-08-01 ENCOUNTER — APPOINTMENT (OUTPATIENT)
Dept: INFUSION THERAPY | Facility: HOSPITAL | Age: 67
End: 2022-08-01

## 2022-08-01 ENCOUNTER — OUTPATIENT (OUTPATIENT)
Dept: OUTPATIENT SERVICES | Facility: HOSPITAL | Age: 67
LOS: 1 days | End: 2022-08-01
Payer: COMMERCIAL

## 2022-08-01 DIAGNOSIS — Z98.890 OTHER SPECIFIED POSTPROCEDURAL STATES: Chronic | ICD-10-CM

## 2022-08-01 DIAGNOSIS — Z11.52 ENCOUNTER FOR SCREENING FOR COVID-19: ICD-10-CM

## 2022-08-01 LAB — SARS-COV-2 RNA SPEC QL NAA+PROBE: SIGNIFICANT CHANGE UP

## 2022-08-01 PROCEDURE — U0005: CPT

## 2022-08-01 PROCEDURE — U0003: CPT

## 2022-08-01 PROCEDURE — C9803: CPT

## 2022-08-03 ENCOUNTER — TRANSCRIPTION ENCOUNTER (OUTPATIENT)
Age: 67
End: 2022-08-03

## 2022-08-03 NOTE — ASU DISCHARGE PLAN (ADULT/PEDIATRIC) - NS MD DC FALL RISK RISK
For information on Fall & Injury Prevention, visit: [email or web address] OR  [email or web address] OR  https://www.cdc.gov/steadi/patient.html

## 2022-08-03 NOTE — ASU DISCHARGE PLAN (ADULT/PEDIATRIC) - CALL YOUR DOCTOR IF YOU HAVE ANY OF THE FOLLOWING:
100.4F/Bleeding that does not stop/Swelling that gets worse/Pain not relieved by Medications/Fever greater than (need to indicate Fahrenheit or Celsius)/Wound/Surgical Site with redness, or foul smelling discharge or pus/Numbness, tingling, color or temperature change to extremity

## 2022-08-04 ENCOUNTER — OUTPATIENT (OUTPATIENT)
Dept: OUTPATIENT SERVICES | Facility: HOSPITAL | Age: 67
LOS: 1 days | End: 2022-08-04
Payer: COMMERCIAL

## 2022-08-04 ENCOUNTER — APPOINTMENT (OUTPATIENT)
Dept: ORTHOPEDIC SURGERY | Facility: HOSPITAL | Age: 67
End: 2022-08-04

## 2022-08-04 VITALS
OXYGEN SATURATION: 96 % | RESPIRATION RATE: 16 BRPM | HEIGHT: 72 IN | WEIGHT: 200.62 LBS | SYSTOLIC BLOOD PRESSURE: 116 MMHG | HEART RATE: 65 BPM | TEMPERATURE: 98 F | DIASTOLIC BLOOD PRESSURE: 78 MMHG

## 2022-08-04 VITALS
OXYGEN SATURATION: 98 % | TEMPERATURE: 97 F | DIASTOLIC BLOOD PRESSURE: 63 MMHG | HEART RATE: 62 BPM | SYSTOLIC BLOOD PRESSURE: 115 MMHG | RESPIRATION RATE: 16 BRPM

## 2022-08-04 DIAGNOSIS — Z01.818 ENCOUNTER FOR OTHER PREPROCEDURAL EXAMINATION: ICD-10-CM

## 2022-08-04 DIAGNOSIS — M19.031 PRIMARY OSTEOARTHRITIS, RIGHT WRIST: ICD-10-CM

## 2022-08-04 DIAGNOSIS — Z98.890 OTHER SPECIFIED POSTPROCEDURAL STATES: Chronic | ICD-10-CM

## 2022-08-04 PROCEDURE — 25215 REMOVAL OF WRIST BONES: CPT | Mod: RT

## 2022-08-04 RX ORDER — CHLORHEXIDINE GLUCONATE 213 G/1000ML
1 SOLUTION TOPICAL ONCE
Refills: 0 | Status: COMPLETED | OUTPATIENT
Start: 2022-08-04 | End: 2022-08-04

## 2022-08-04 RX ORDER — ONDANSETRON 8 MG/1
4 TABLET, FILM COATED ORAL ONCE
Refills: 0 | Status: DISCONTINUED | OUTPATIENT
Start: 2022-08-04 | End: 2022-08-19

## 2022-08-04 RX ORDER — LIDOCAINE HCL 20 MG/ML
0.2 VIAL (ML) INJECTION ONCE
Refills: 0 | Status: COMPLETED | OUTPATIENT
Start: 2022-08-04 | End: 2022-08-04

## 2022-08-04 RX ORDER — FENTANYL CITRATE 50 UG/ML
25 INJECTION INTRAVENOUS
Refills: 0 | Status: DISCONTINUED | OUTPATIENT
Start: 2022-08-04 | End: 2022-08-04

## 2022-08-04 RX ORDER — CEFAZOLIN SODIUM 1 G
2000 VIAL (EA) INJECTION ONCE
Refills: 0 | Status: COMPLETED | OUTPATIENT
Start: 2022-08-04 | End: 2022-08-04

## 2022-08-04 RX ADMIN — SODIUM CHLORIDE 100 MILLILITER(S): 9 INJECTION, SOLUTION INTRAVENOUS at 13:24

## 2022-08-04 RX ADMIN — CHLORHEXIDINE GLUCONATE 1 APPLICATION(S): 213 SOLUTION TOPICAL at 13:24

## 2022-08-04 NOTE — ASU PATIENT PROFILE, ADULT - AS SC BRADEN SENSORY
(4) no impairment Consent (Near Eyelid Margin)/Introductory Paragraph: The rationale for Mohs was explained to the patient and consent was obtained. The risks, benefits and alternatives to therapy were discussed in detail. Specifically, the risks of ectropion or eyelid deformity, infection, scarring, bleeding, prolonged wound healing, incomplete removal, allergy to anesthesia, nerve injury and recurrence were addressed. Prior to the procedure, the treatment site was clearly identified and confirmed by the patient. All components of Universal Protocol/PAUSE Rule completed.

## 2022-08-04 NOTE — ASU PATIENT PROFILE, ADULT - FALL HARM RISK - UNIVERSAL INTERVENTIONS
Bed in lowest position, wheels locked, appropriate side rails in place/Call bell, personal items and telephone in reach/Instruct patient to call for assistance before getting out of bed or chair/Non-slip footwear when patient is out of bed/[place]arker to call system/Physically safe environment - no spills, clutter or unnecessary equipment/Purposeful Proactive Rounding/Room/bathroom lighting operational, light cord in reach

## 2022-08-10 ENCOUNTER — APPOINTMENT (OUTPATIENT)
Dept: ORTHOPEDIC SURGERY | Facility: CLINIC | Age: 67
End: 2022-08-10

## 2022-08-10 PROCEDURE — 73110 X-RAY EXAM OF WRIST: CPT | Mod: RT

## 2022-08-10 PROCEDURE — 99024 POSTOP FOLLOW-UP VISIT: CPT

## 2022-08-10 PROCEDURE — 29075 APPL CST ELBW FNGR SHORT ARM: CPT | Mod: 58,RT

## 2022-08-17 ENCOUNTER — APPOINTMENT (OUTPATIENT)
Dept: ORTHOPEDIC SURGERY | Facility: CLINIC | Age: 67
End: 2022-08-17

## 2022-08-17 PROCEDURE — 73110 X-RAY EXAM OF WRIST: CPT | Mod: RT

## 2022-08-17 PROCEDURE — 29075 APPL CST ELBW FNGR SHORT ARM: CPT | Mod: RT

## 2022-08-17 PROCEDURE — 99024 POSTOP FOLLOW-UP VISIT: CPT

## 2022-09-02 ENCOUNTER — APPOINTMENT (OUTPATIENT)
Dept: ORTHOPEDIC SURGERY | Facility: CLINIC | Age: 67
End: 2022-09-02

## 2022-09-02 PROCEDURE — 99024 POSTOP FOLLOW-UP VISIT: CPT

## 2022-09-02 PROCEDURE — 73110 X-RAY EXAM OF WRIST: CPT | Mod: RT

## 2022-09-12 ENCOUNTER — APPOINTMENT (OUTPATIENT)
Dept: ORTHOPEDIC SURGERY | Facility: CLINIC | Age: 67
End: 2022-09-12

## 2022-10-03 ENCOUNTER — APPOINTMENT (OUTPATIENT)
Dept: ORTHOPEDIC SURGERY | Facility: CLINIC | Age: 67
End: 2022-10-03

## 2022-10-03 PROCEDURE — 99024 POSTOP FOLLOW-UP VISIT: CPT

## 2022-10-03 PROCEDURE — 73110 X-RAY EXAM OF WRIST: CPT | Mod: RT

## 2022-10-14 ENCOUNTER — APPOINTMENT (OUTPATIENT)
Dept: ORTHOPEDIC SURGERY | Facility: CLINIC | Age: 67
End: 2022-10-14

## 2022-10-24 ENCOUNTER — APPOINTMENT (OUTPATIENT)
Dept: ORTHOPEDIC SURGERY | Facility: CLINIC | Age: 67
End: 2022-10-24

## 2022-10-24 PROCEDURE — 99024 POSTOP FOLLOW-UP VISIT: CPT

## 2022-10-24 PROCEDURE — 73110 X-RAY EXAM OF WRIST: CPT | Mod: RT

## 2022-10-25 ENCOUNTER — APPOINTMENT (OUTPATIENT)
Dept: HEMATOLOGY ONCOLOGY | Facility: CLINIC | Age: 67
End: 2022-10-25

## 2022-10-25 VITALS
BODY MASS INDEX: 32.64 KG/M2 | HEIGHT: 72 IN | WEIGHT: 241 LBS | TEMPERATURE: 98 F | OXYGEN SATURATION: 98 % | HEART RATE: 70 BPM

## 2022-10-25 PROCEDURE — 99213 OFFICE O/P EST LOW 20 MIN: CPT | Mod: 25,GC

## 2022-10-25 PROCEDURE — 85025 COMPLETE CBC W/AUTO DIFF WBC: CPT | Mod: GC

## 2022-10-25 NOTE — END OF VISIT
[Time Spent: ___ minutes] : I have spent [unfilled] minutes of time on the encounter. [FreeTextEntry3] : Patient seen and case discussed with Dr Landis. I agree with above and have edited the note where needed.\par

## 2022-10-25 NOTE — HISTORY OF PRESENT ILLNESS
[Treatment Protocol] : Treatment Protocol [de-identified] : Mr. Gregory Mercado is a 66 year old man with PMHx of Us esophagus, history of two knee replacements, hip replacements, neck surgery, arm surgery, and lower back surgery, He takes baclofen, nabumetone, naproxen, and sometimes Tylenol extra strength for pain. He also take omeprazole. Patient presents today for initial consultation for recent diagnosis of DLBCL, non-germinal center subtype. Patient had a sonoguided core biopsy of right lymph node of neck on 7/28/21 with pathology resulting in \par CD5, CD10 negative lymphoma, favor Diffuse large B-cell lymphoma, non-Germinal center subtype.  \par \par Patient reports he had enlarged right neck mass that began growing 1 month ago. He reported this to his PCP, who proceeded the patient to have sono guide core biopsy. \par He is a retired  and is now a full time .\par \par Started R-CHOP 8/30/21. PET/CT on 10/2/2021- shows response to therapy. Pt had CT scans on neck/abd/pelvis done 11/19/21 which show Posttreatment changes to multiple right cervical nodes. No pathologically enlarged lymph nodes currently. Small scattered bilateral groundglass opacities, which may be infectious or inflammatory in etiology. No suspicious lymphadenopathy. He received Cycle 5 of R-CHOP chemotherapy on 1/122/21. On 12/3/21 developed new onset chest pain and shortness of breath. Was recommended to go to the ER, did not want to go, attempted to arrange outpatient CT PE which was unable to be done. he was admitted from 12/6/21-12/11/21 for new onset pleuritic chest pain and shortness of breath, found to have organizing pneumonia on CT imaging, improved with antibiotics. He is now off supplemental O2. Decision was made to hold further therapy in the setting of PNA.  [FreeTextEntry1] : R-CHOP: 8/30/21- 11/22/21 (5 cycles) [de-identified] : 1/14/22: Pt s/p cycle 5 of R-CHOP and tolerated treatment on 11/22/21. Cycle 6 held due to CAP. \par Pt has noticed new whole body itching, worse at night X 3 weeks. His wife has been using a new laundry softener for the pat month, no other new soaps/lotions/perfumes/foods. Benadryl helps with the itch. Denies fever/chills, No drenching night sweats. No CP/SOB. No lower extremity edema. Appetite is okay. Pt is gaining weight. No abd pain. No diarrhea/constipation. No hematuria, dysuria. No recent/recurrent infections. Energy levels have improved. Denies numbness/tingling of hands or feet. No mouth sores.\par \par 4/21/22: Mr. Mercado presents for a follow up visit. s/p cycle 5 of R-CHOP, tolerated treatment on 11/22/21. Cycle 6 held due to CAP. Since he was last seen in Jan 2022, he reports no new symptoms, including appetite changes, lymphadenopathy, fevers, chills, night sweats. He was seen by orthopedics for his right wrist swelling; surgical options were discussed, but decision was made to wait until Mediport was removed before proceeding with surgery. He reports that pain is stable. Seen by his PMD, blood work done to r/o autoimmune arthritis; he was concerned since his REILLY was 1:80. Reviewed blood work with him this visit. He is not currently taking any meds aside from Tylenol for his wrist pain and omeprazole for gastric reflux.\par \par 7/28/22: Since his last visit he continues to feel well. No new lymphadenopathy, appetite changes, fevers, chills or night sweats. He is going to have surgery on his R hand with Dr. Ashford on August 4. No recent infections. He has noticed that he loses his voice when he talks a lot for the last month but overall it isn't bothersome.\par \par 10/25/22: Patient presents for follow up. He is overall doing very well. He denies significant complaints except some mild numbness in his hands at night. He had R hand surgery (carpectomy) in August successfully. He is doing hand physical therapy. He denies pain, N/V, diarrhea, constipation, fevers, chills, night sweats, unintentional weight loss or poor appetite, swelling, rash, or new nodules or lumps.

## 2022-10-25 NOTE — PHYSICAL EXAM
[Fully active, able to carry on all pre-disease performance without restriction] : Status 0 - Fully active, able to carry on all pre-disease performance without restriction [Normal] : normal appearance, no rash, nodules, vesicles, ulcers, erythema [de-identified] : R dorsal wrist surgical scar, healed

## 2022-10-25 NOTE — ASSESSMENT
[FreeTextEntry1] : 66 year old male presenting for initial consultation for recent diagnosis of DLBCL, non-germinal center subtype. Patient had a sonoguided core biopsy of right lymph node of neck on 7/28/21 with pathology resulting in \par CD5, CD10 negative lymphoma, favor Diffuse large B-cell lymphoma, non-Germinal center subtype. Given the pathology results, he will go for an excisional biopsy next week. We discussed that we can start prednisone after the biopsy which will improve his symptoms. PET/CT demonstrated the known R cervical node (SUV 30s) and possibly a 0.9 cm perigastric LN (SUV 5).  PT under went excisional biopsy of right neck LN on 8/18. Path was consistent with DLBCL non-germinal center subtype. He is s/p 5 cycles R-CHOP on 11/22/21.\par \par #DLBCL non-germinal center subtype\par - Pt is s/p Cycle 1 R-CHOP on 8/30/21 \par - S/p C2 on 9/20/21 (PET/CT 10/2/21 showed response), C3 on 10/11/21, C4 on 11/1/21, C5 on 11/22/21\par - Repeat CT scan done 11/19/21 showed posttreatment changes to multiple right cervical nodes. No pathologically enlarged lymph nodes currently. Small scattered bilateral ground glass opacities, which may be infectious or inflammatory in etiology. No suspicious lymphadenopathy. \par - Was due for C6 on 12/13/2021 but was admitted to hospital for CAP requiring antibiotics and O2\par - Treatment deferred for C6 given symptoms and treatment delay\par - Last CT neck/C/A/P 4/30/22 unremarkable\par - Due for scans. Of note, pt has contrast allergy. Cont Benadryl and prednisone prior to scans. \par - Continue port flush Q1-3 months, last 8/1/22\par - RTO in 3 months\par \par #Hx Carpal Tunnel syndrome\par - Not related to lymphoma\par - S/p R proximal carpectomy 08/2022\par - Continues to f/u with ortho hand\par \par #Hoarseness\par - Resolved. CT neck 4/30/22 negative\par - Can follow up with Dr. Ulloa\par \par Patient seen with and plan discussed with Dr. Mayen.\par \par Aryles Hedjar, MD, PGY-5\par Hematology/Oncology Fellow\par [facility]

## 2022-10-25 NOTE — REVIEW OF SYSTEMS
[Patient Intake Form Reviewed] : Patient intake form was reviewed [Swollen Glands] : swollen glands [Negative] : Musculoskeletal [de-identified] : minimal numbness b/l hands

## 2022-10-25 NOTE — RESULTS/DATA
[FreeTextEntry1] : CBC 4/21/22: \par WBC 5.5, ANC 3.91, ALC 1.04, Hgb 15.1, Plt 288.0\par \par CBC done 10/25/22:\par WBC- 5.2\par ALC- 1.22\par ANC-  3.41\par Hgb- 14.7\par Hct- 43.9\par Plts- 300\par \par \par Biopsy result:\par 1.  Lymph node, right neck, sono-guided core biopsy:\par - CD5, CD10 negative lymphoma, favor Diffuse large B-cell\par lymphoma, non-Germinal center subtype.\par \par See note and description.\par \par Diagnostic note:  The current right neck lymph node is\par morphologically and immunophenotypically consistent with a CD5,\par CD10 negative lymphoma, favor diffuse large B-cell lymphoma, non-\par germinal center subtype. Additional studies, including molecular\par and cytogenetics/FISH are performed and will be reported in a\par comprehensive addendum.\par \par Dr. Orozco was urgently faxed report on 8/2/21.\par \par Microscopic description:   The histologic sections of the right\par neck lymph node biopsy shows fragmented lymphoid tissue with\par areas of increased atypical larger cells. The larger cells show\par increased nuclear to cytoplasmic ratio, irregular nuclear\par contours and some with prominent nucleoli. No necrosis is present\par and mitoses/apoptosis are frequent.\par \par Immunohistochemical stains for CD3, CD5, CD20, PAX5, CD10, BCL6,\par BCL2, CyclinD1, Ki67, CD23, CD21, CD43, CD79a, MUM1 stains\par performed on formalin fixed paraffin embedded tissue, block 1A.\par \par Neoplastic cells are:  the larger atypical cells\par Positive:  CD20, PAX-5, BCL-6, MUM1, BCL2, p53 and some scattered\par CD30 positive cells, KI-67% is high within the areas of the\par larger cells (70-80%), c-MYC\par Negative: CD3, CD5, CD10, CD23 and CD21 (in the areas of the\par larger cells), ROSE\par Other:  CD3 and CD5 are positive in T-cells\par \par Flow cytometry: Monotypic B-cells (33% of cells), positive for\par kappa, CD19, CD20, FMC-7; negative CD5, CD10, CD23. The findings\par are consistent with CD5 negative, CD10 negative B-cell\par lymphoproliferative disorder/lymphoma\par \par \par PET/CT 8/12/21\par IMPRESSION: Abnormal FDG-PET/CT scan.\par \par 1. FDG-avid right cervical lymphadenopathy corresponds to biopsy-proven lymphoma.\par \par 2. Small FDG-avid perigastric lymph node is indeterminate, possibly lymphoma.\par \par --- End of Report ---\par

## 2022-10-26 ENCOUNTER — RESULT REVIEW (OUTPATIENT)
Age: 67
End: 2022-10-26

## 2022-10-26 LAB
ALBUMIN SERPL ELPH-MCNC: 4.4 G/DL
ALP BLD-CCNC: 104 U/L
ALT SERPL-CCNC: 16 U/L
ANION GAP SERPL CALC-SCNC: 10 MMOL/L
AST SERPL-CCNC: 18 U/L
BILIRUB SERPL-MCNC: 0.4 MG/DL
BUN SERPL-MCNC: 16 MG/DL
CALCIUM SERPL-MCNC: 9.5 MG/DL
CHLORIDE SERPL-SCNC: 105 MMOL/L
CO2 SERPL-SCNC: 26 MMOL/L
CREAT SERPL-MCNC: 1.15 MG/DL
EGFR: 69 ML/MIN/1.73M2
GLUCOSE SERPL-MCNC: 90 MG/DL
LDH SERPL-CCNC: 153 U/L
MAGNESIUM SERPL-MCNC: 2 MG/DL
PHOSPHATE SERPL-MCNC: 3.3 MG/DL
POTASSIUM SERPL-SCNC: 4.5 MMOL/L
PROT SERPL-MCNC: 7.4 G/DL
SODIUM SERPL-SCNC: 140 MMOL/L
URATE SERPL-MCNC: 6.6 MG/DL

## 2022-10-28 ENCOUNTER — OUTPATIENT (OUTPATIENT)
Dept: OUTPATIENT SERVICES | Facility: HOSPITAL | Age: 67
LOS: 1 days | Discharge: HOME | End: 2022-10-28

## 2022-10-28 DIAGNOSIS — Z98.890 OTHER SPECIFIED POSTPROCEDURAL STATES: Chronic | ICD-10-CM

## 2022-10-28 DIAGNOSIS — C83.10 MANTLE CELL LYMPHOMA, UNSPECIFIED SITE: ICD-10-CM

## 2022-10-29 ENCOUNTER — OUTPATIENT (OUTPATIENT)
Dept: OUTPATIENT SERVICES | Facility: HOSPITAL | Age: 67
LOS: 1 days | End: 2022-10-29
Payer: COMMERCIAL

## 2022-10-29 ENCOUNTER — APPOINTMENT (OUTPATIENT)
Dept: CT IMAGING | Facility: IMAGING CENTER | Age: 67
End: 2022-10-29

## 2022-10-29 DIAGNOSIS — Z98.890 OTHER SPECIFIED POSTPROCEDURAL STATES: Chronic | ICD-10-CM

## 2022-10-29 DIAGNOSIS — C85.10 UNSPECIFIED B-CELL LYMPHOMA, UNSPECIFIED SITE: ICD-10-CM

## 2022-10-29 PROCEDURE — 74177 CT ABD & PELVIS W/CONTRAST: CPT | Mod: 26

## 2022-10-29 PROCEDURE — 74177 CT ABD & PELVIS W/CONTRAST: CPT

## 2022-10-29 PROCEDURE — 71260 CT THORAX DX C+: CPT

## 2022-10-29 PROCEDURE — 71260 CT THORAX DX C+: CPT | Mod: 26

## 2022-10-29 PROCEDURE — 70491 CT SOFT TISSUE NECK W/DYE: CPT | Mod: 26

## 2022-10-29 PROCEDURE — 82565 ASSAY OF CREATININE: CPT

## 2022-10-29 PROCEDURE — 70491 CT SOFT TISSUE NECK W/DYE: CPT

## 2022-11-01 ENCOUNTER — APPOINTMENT (OUTPATIENT)
Dept: INFUSION THERAPY | Facility: HOSPITAL | Age: 67
End: 2022-11-01

## 2022-12-02 ENCOUNTER — APPOINTMENT (OUTPATIENT)
Dept: ORTHOPEDIC SURGERY | Facility: CLINIC | Age: 67
End: 2022-12-02

## 2022-12-02 DIAGNOSIS — M19.031 PRIMARY OSTEOARTHRITIS, RIGHT WRIST: ICD-10-CM

## 2022-12-02 DIAGNOSIS — Z98.890 OTHER SPECIFIED POSTPROCEDURAL STATES: ICD-10-CM

## 2022-12-02 PROCEDURE — 99214 OFFICE O/P EST MOD 30 MIN: CPT | Mod: 25

## 2022-12-02 PROCEDURE — 20600 DRAIN/INJ JOINT/BURSA W/O US: CPT | Mod: RT

## 2022-12-27 ENCOUNTER — OUTPATIENT (OUTPATIENT)
Dept: OUTPATIENT SERVICES | Facility: HOSPITAL | Age: 67
LOS: 1 days | Discharge: HOME | End: 2022-12-27

## 2022-12-27 DIAGNOSIS — Z98.890 OTHER SPECIFIED POSTPROCEDURAL STATES: Chronic | ICD-10-CM

## 2022-12-27 DIAGNOSIS — C83.10 MANTLE CELL LYMPHOMA, UNSPECIFIED SITE: ICD-10-CM

## 2023-01-04 ENCOUNTER — APPOINTMENT (OUTPATIENT)
Dept: INFUSION THERAPY | Facility: HOSPITAL | Age: 68
End: 2023-01-04

## 2023-01-09 NOTE — ED ADULT TRIAGE NOTE - INTERNATIONAL TRAVEL
No p/w 1mo progressive LLE pain and difficulty ambulating x1wk; likely in setting of known L femur metastatic disease; previously on 5mg oxycodone and increased to 10mg; f/w pain management and recently started on methadone 5mg in the last week without relief  - XR L knee/hip/pelvis/femur neg acute fx  - pain control per palliative: 2.5 mg methadone, oxycodone 5mg q8 standing (pt can refuse), 10 mg q 4 for mod pain and 15 mg IR q 4 for severe pain.  - bowel regimen while on opioids- last BM 1/8   - MRI 1/5 completed, no surgical intervention. Plan for RT, sim today followed by 5 sessions

## 2023-01-26 ENCOUNTER — RESULT REVIEW (OUTPATIENT)
Age: 68
End: 2023-01-26

## 2023-01-26 ENCOUNTER — APPOINTMENT (OUTPATIENT)
Dept: HEMATOLOGY ONCOLOGY | Facility: CLINIC | Age: 68
End: 2023-01-26
Payer: MEDICARE

## 2023-01-26 VITALS
HEART RATE: 70 BPM | TEMPERATURE: 97 F | OXYGEN SATURATION: 98 % | DIASTOLIC BLOOD PRESSURE: 80 MMHG | WEIGHT: 244.49 LBS | BODY MASS INDEX: 33.16 KG/M2 | SYSTOLIC BLOOD PRESSURE: 124 MMHG | RESPIRATION RATE: 16 BRPM

## 2023-01-26 LAB
ALBUMIN SERPL ELPH-MCNC: 4.3 G/DL
ALP BLD-CCNC: 95 U/L
ALT SERPL-CCNC: 17 U/L
ANION GAP SERPL CALC-SCNC: 11 MMOL/L
AST SERPL-CCNC: 20 U/L
BASOPHILS # BLD AUTO: 0.05 K/UL — SIGNIFICANT CHANGE UP (ref 0–0.2)
BASOPHILS NFR BLD AUTO: 0.8 % — SIGNIFICANT CHANGE UP (ref 0–2)
BILIRUB SERPL-MCNC: 0.4 MG/DL
BUN SERPL-MCNC: 17 MG/DL
CALCIUM SERPL-MCNC: 9.5 MG/DL
CHLORIDE SERPL-SCNC: 104 MMOL/L
CO2 SERPL-SCNC: 26 MMOL/L
CREAT SERPL-MCNC: 1.11 MG/DL
EGFR: 72 ML/MIN/1.73M2
EOSINOPHIL # BLD AUTO: 0.06 K/UL — SIGNIFICANT CHANGE UP (ref 0–0.5)
EOSINOPHIL NFR BLD AUTO: 0.9 % — SIGNIFICANT CHANGE UP (ref 0–6)
GLUCOSE SERPL-MCNC: 113 MG/DL
HCT VFR BLD CALC: 43.5 % — SIGNIFICANT CHANGE UP (ref 39–50)
HGB BLD-MCNC: 14.7 G/DL — SIGNIFICANT CHANGE UP (ref 13–17)
IMM GRANULOCYTES NFR BLD AUTO: 0.5 % — SIGNIFICANT CHANGE UP (ref 0–0.9)
LDH SERPL-CCNC: 149 U/L
LYMPHOCYTES # BLD AUTO: 1.03 K/UL — SIGNIFICANT CHANGE UP (ref 1–3.3)
LYMPHOCYTES # BLD AUTO: 15.5 % — SIGNIFICANT CHANGE UP (ref 13–44)
MAGNESIUM SERPL-MCNC: 1.9 MG/DL
MCHC RBC-ENTMCNC: 28.8 PG — SIGNIFICANT CHANGE UP (ref 27–34)
MCHC RBC-ENTMCNC: 33.8 G/DL — SIGNIFICANT CHANGE UP (ref 32–36)
MCV RBC AUTO: 85.3 FL — SIGNIFICANT CHANGE UP (ref 80–100)
MONOCYTES # BLD AUTO: 0.53 K/UL — SIGNIFICANT CHANGE UP (ref 0–0.9)
MONOCYTES NFR BLD AUTO: 8 % — SIGNIFICANT CHANGE UP (ref 2–14)
NEUTROPHILS # BLD AUTO: 4.95 K/UL — SIGNIFICANT CHANGE UP (ref 1.8–7.4)
NEUTROPHILS NFR BLD AUTO: 74.3 % — SIGNIFICANT CHANGE UP (ref 43–77)
NRBC # BLD: 0 /100 WBCS — SIGNIFICANT CHANGE UP (ref 0–0)
PHOSPHATE SERPL-MCNC: 2.9 MG/DL
PLATELET # BLD AUTO: 286 K/UL — SIGNIFICANT CHANGE UP (ref 150–400)
POTASSIUM SERPL-SCNC: 4.3 MMOL/L
PROT SERPL-MCNC: 7 G/DL
RBC # BLD: 5.1 M/UL — SIGNIFICANT CHANGE UP (ref 4.2–5.8)
RBC # FLD: 13 % — SIGNIFICANT CHANGE UP (ref 10.3–14.5)
SODIUM SERPL-SCNC: 140 MMOL/L
URATE SERPL-MCNC: 6.4 MG/DL
WBC # BLD: 6.65 K/UL — SIGNIFICANT CHANGE UP (ref 3.8–10.5)
WBC # FLD AUTO: 6.65 K/UL — SIGNIFICANT CHANGE UP (ref 3.8–10.5)

## 2023-01-26 PROCEDURE — 99213 OFFICE O/P EST LOW 20 MIN: CPT | Mod: GC

## 2023-01-28 NOTE — REASON FOR VISIT
[Follow-Up Visit] : a follow-up visit for [Spouse] : spouse [[employer] ] : provided by [employer]   [Time Spent: ____ minutes] : Total time spent using  services: [unfilled] minutes. The patient's primary language is not English thus required  services. [Interpreters_IDNumber] : [id number] [Interpreters_FullName] : Carlos Eduardo [TWNoteComboBox1] : [place]

## 2023-01-28 NOTE — END OF VISIT
[] : Resident [FreeTextEntry3] : Patient seen and case discussed with Dr Bloom. I agree with above and have edited the note where needed.\par

## 2023-01-28 NOTE — ASSESSMENT
[FreeTextEntry1] : 67 year old male presenting for initial consultation for recent diagnosis of DLBCL, non-germinal center subtype. Patient had a sonoguided core biopsy of right lymph node of neck on 7/28/21 with pathology resulting in \par CD5, CD10 negative lymphoma, favor Diffuse large B-cell lymphoma, non-Germinal center subtype. Given the pathology results, he will go for an excisional biopsy next week. We discussed that we can start prednisone after the biopsy which will improve his symptoms. PET/CT demonstrated the known R cervical node (SUV 30s) and possibly a 0.9 cm perigastric LN (SUV 5). PT under went excisional biopsy of right neck LN on 8/18. Path was consistent with DLBCL non-germinal center subtype. He is s/p 5 cycles R-CHOP on 11/22/21.\par \par #DLBCL non-germinal center subtype\par - Pt is s/p Cycle 1 R-CHOP on 8/30/21 \par - S/p C2 on 9/20/21 (PET/CT 10/2/21 showed response), C3 on 10/11/21, C4 on 11/1/21, C5 on 11/22/21\par - Repeat CT scan done 11/19/21 showed posttreatment changes to multiple right cervical nodes. No pathologically enlarged lymph nodes currently. Small scattered bilateral ground glass opacities, which may be infectious or inflammatory in etiology. No suspicious lymphadenopathy. \par - Was due for C6 on 12/13/2021 but was admitted to hospital for CAP requiring antibiotics and O2\par - Treatment deferred for C6 given symptoms and treatment delay\par - Last CT neck/C/A/P 10/29/22 without evidence of lymphoma \par - obtain repeat CT C/A/P surveillance May 2023. Of note, pt has contrast allergy. Cont Benadryl and prednisone prior to scans. Will arange for non-contrast CT scans in May \par - Discussed that the chances of relapse decreases as more time elapses since chemo treatment\par - Continue port flush Q1-3 months (plan for flush and labs at follow up appointment)\par - RTO in 3 months\par \par #Hx Carpal Tunnel syndrome\par - Not related to lymphoma\par - S/p R proximal carpectomy 08/2022\par - Continues to f/u with ortho hand\par \par Case discussed with Dr. Mayen

## 2023-01-28 NOTE — PHYSICAL EXAM
[Fully active, able to carry on all pre-disease performance without restriction] : Status 0 - Fully active, able to carry on all pre-disease performance without restriction [Normal] : affect appropriate [de-identified] : MO [de-identified] : +R wrist pst surgical changes  s/p carpectomy, no tenderness

## 2023-01-28 NOTE — HISTORY OF PRESENT ILLNESS
[Treatment Protocol] : Treatment Protocol [de-identified] : Mr. Gregory Mercado is a 67 year old man with PMHx of Us esophagus, history of two knee replacements, hip replacements, neck surgery, arm surgery, and lower back surgery, He takes baclofen, nabumetone, naproxen, and sometimes Tylenol extra strength for pain. He also take omeprazole. Patient presents today for initial consultation for recent diagnosis of DLBCL, non-germinal center subtype. Patient had a sonoguided core biopsy of right lymph node of neck on 7/28/21 with pathology resulting in \par CD5, CD10 negative lymphoma, favor Diffuse large B-cell lymphoma, non-Germinal center subtype. \par \par Patient reports he had enlarged right neck mass that began growing 1 month ago. He reported this to his PCP, who proceeded the patient to have sono guide core biopsy. \par He is a retired  and is now a full time .\par \par Started R-CHOP 8/30/21. PET/CT on 10/2/2021- shows response to therapy. Pt had CT scans on neck/abd/pelvis done 11/19/21 which show Posttreatment changes to multiple right cervical nodes. No pathologically enlarged lymph nodes currently. Small scattered bilateral groundglass opacities, which may be infectious or inflammatory in etiology. No suspicious lymphadenopathy. He received Cycle 5 of R-CHOP chemotherapy on 1/122/21. On 12/3/21 developed new onset chest pain and shortness of breath. Was recommended to go to the ER, did not want to go, attempted to arrange outpatient CT PE which was unable to be done. he was admitted from 12/6/21-12/11/21 for new onset pleuritic chest pain and shortness of breath, found to have organizing pneumonia on CT imaging, improved with antibiotics. He is now off supplemental O2. Decision was made to hold further therapy in the setting of PNA. \par \par History: 1/14/22: Pt s/p cycle 5 of R-CHOP and tolerated treatment on 11/22/21. Cycle 6 held due to CAP. \par Pt has noticed new whole body itching, worse at night X 3 weeks. His wife has been using a new laundry softener for the pat month, no other new soaps/lotions/perfumes/foods. Benadryl helps with the itch. Denies fever/chills, No drenching night sweats. No CP/SOB. No lower extremity edema. Appetite is okay. Pt is gaining weight. No abd pain. No diarrhea/constipation. No hematuria, dysuria. No recent/recurrent infections. Energy levels have improved. Denies numbness/tingling of hands or feet. No mouth sores.\par \par 4/21/22: Mr. Mercado presents for a follow up visit. s/p cycle 5 of R-CHOP, tolerated treatment on 11/22/21. Cycle 6 held due to CAP. Since he was last seen in Jan 2022, he reports no new symptoms, including appetite changes, lymphadenopathy, fevers, chills, night sweats. He was seen by orthopedics for his right wrist swelling; surgical options were discussed, but decision was made to wait until Mediport was removed before proceeding with surgery. He reports that pain is stable. Seen by his PMD, blood work done to r/o autoimmune arthritis; he was concerned since his REILLY was 1:80. Reviewed blood work with him this visit. He is not currently taking any meds aside from Tylenol for his wrist pain and omeprazole for gastric reflux.\par \par 7/28/22: Since his last visit he continues to feel well. No new lymphadenopathy, appetite changes, fevers, chills or night sweats. He is going to have surgery on his R hand with Dr. Ashford on August 4. No recent infections. He has noticed that he loses his voice when he talks a lot for the last month but overall it isn't bothersome.\par \par 10/25/22: Patient presents for follow up. He is overall doing very well. He denies significant complaints except some mild numbness in his hands at night. He had R hand surgery (carpectomy) in August successfully. He is doing hand physical therapy. He denies pain, N/V, diarrhea, constipation, fevers, chills, night sweats, unintentional weight loss or poor appetite, swelling, rash, or new nodules or lumps.  [FreeTextEntry1] : R-CHOP: 8/30/21- 11/22/21 (5 cycles) [de-identified] : 1/26/23: Reports he is feeling well overall. Reports chronic right hand carpal tunnel and recently received an injection with Ortho. Reports some itchiness on right hand and back without rash. Reports one week history of left plantar foot discomfort but does not limit ambulation. Denies fevers, chills, chest pain, shortness of breath, abdominal pain, nausea, vomiting, diarrhea, constipation or skin rash.\par

## 2023-03-01 RX ORDER — CEPHALEXIN 500 MG/1
500 CAPSULE ORAL
Qty: 14 | Refills: 0 | Status: DISCONTINUED | COMMUNITY
Start: 2021-10-07 | End: 2023-03-01

## 2023-03-01 RX ORDER — PREDNISONE 50 MG/1
50 TABLET ORAL
Qty: 60 | Refills: 0 | Status: DISCONTINUED | COMMUNITY
Start: 2021-08-26 | End: 2023-03-01

## 2023-03-01 RX ORDER — FLUORIDE TOOTHPASTE
TOOTHPASTE DENTAL DAILY
Qty: 1 | Refills: 3 | Status: DISCONTINUED | COMMUNITY
Start: 2021-08-26 | End: 2023-03-01

## 2023-03-02 ENCOUNTER — APPOINTMENT (OUTPATIENT)
Dept: NEUROSURGERY | Facility: CLINIC | Age: 68
End: 2023-03-02
Payer: MEDICARE

## 2023-03-02 VITALS
OXYGEN SATURATION: 97 % | HEART RATE: 71 BPM | TEMPERATURE: 98.1 F | WEIGHT: 247 LBS | HEIGHT: 72 IN | DIASTOLIC BLOOD PRESSURE: 77 MMHG | BODY MASS INDEX: 33.46 KG/M2 | SYSTOLIC BLOOD PRESSURE: 141 MMHG

## 2023-03-02 DIAGNOSIS — M51.34 OTHER INTERVERTEBRAL DISC DISPLACEMENT, THORACIC REGION: ICD-10-CM

## 2023-03-02 DIAGNOSIS — M51.24 OTHER INTERVERTEBRAL DISC DISPLACEMENT, THORACIC REGION: ICD-10-CM

## 2023-03-02 PROCEDURE — 99213 OFFICE O/P EST LOW 20 MIN: CPT

## 2023-03-02 NOTE — PHYSICAL EXAM
[General Appearance - Alert] : alert [General Appearance - In No Acute Distress] : in no acute distress [Oriented To Time, Place, And Person] : oriented to person, place, and time [Impaired Insight] : insight and judgment were intact [Affect] : the affect was normal [Normal] : normal [4] : 4/5 Elbow Extensor (C7) [5] : 5/5 Finger Flexors (C8)

## 2023-03-04 NOTE — RESULTS/DATA
[FreeTextEntry1] : IMPRESSION:  \par \par 1. Left lateral recess T1-2 disc herniation resulting in mild-to-moderate central and left lateral recess stenosis.\par \par 2. Central T9-10 disc herniation resulting in mild central stenosis.

## 2023-03-04 NOTE — HISTORY OF PRESENT ILLNESS
[FreeTextEntry1] : upper back pain  [de-identified] : 3/2/23\par Mr. Mercado is a 68 y/o, male, with a hx of large cell lymphoma s/p CT in remission, B/L THR, TKR, shoulder replacements, ACDF C4-C5 x 7 years ago at [facility]. He reports that prior to ACDF he had neck pain radiating to b/l UE which resolved post op. He is c/o today of worsening R shoulder blade, upper back, UE pain along with numbness, tingling, weakness of b/l arms. He underwent MRI thoracic spine which signified mild thoracic herniations. Denies any radiating pain to ribs, chest. He has not undergone any recent physical therapy. He is currently on ibuprofen for his pain.

## 2023-03-04 NOTE — REVIEW OF SYSTEMS
[As Noted in HPI] : as noted in HPI [Numbness] : numbness [Tingling] : tingling [Abnormal Sensation] : an abnormal sensation [Negative] : Heme/Lymph [de-identified] : upper back pain

## 2023-03-04 NOTE — ASSESSMENT
[FreeTextEntry1] : Mr. Mercado is a 68 y/o, male, with a hx of ACDF C4-C5 x 7 years ago at [facility] with new onset RUE and upper back pain, along with weakness of R arm on exam. Will obtain xay cervical spine and shoulders to r/o instability and tears. MRI cervical spine needed to r/o herniations, stenosis, adjacent level disease. Referral for PT provided for strengthening. Pt to f/u once imaging is completed.

## 2023-04-18 ENCOUNTER — RESULT REVIEW (OUTPATIENT)
Age: 68
End: 2023-04-18

## 2023-04-20 ENCOUNTER — APPOINTMENT (OUTPATIENT)
Dept: CT IMAGING | Facility: IMAGING CENTER | Age: 68
End: 2023-04-20
Payer: MEDICARE

## 2023-04-20 ENCOUNTER — OUTPATIENT (OUTPATIENT)
Dept: OUTPATIENT SERVICES | Facility: HOSPITAL | Age: 68
LOS: 1 days | End: 2023-04-20
Payer: COMMERCIAL

## 2023-04-20 DIAGNOSIS — C85.10 UNSPECIFIED B-CELL LYMPHOMA, UNSPECIFIED SITE: ICD-10-CM

## 2023-04-20 DIAGNOSIS — Z98.890 OTHER SPECIFIED POSTPROCEDURAL STATES: Chronic | ICD-10-CM

## 2023-04-20 PROCEDURE — 74176 CT ABD & PELVIS W/O CONTRAST: CPT | Mod: 26

## 2023-04-20 PROCEDURE — 71250 CT THORAX DX C-: CPT | Mod: 26

## 2023-04-20 PROCEDURE — 70490 CT SOFT TISSUE NECK W/O DYE: CPT

## 2023-04-20 PROCEDURE — 74176 CT ABD & PELVIS W/O CONTRAST: CPT

## 2023-04-20 PROCEDURE — 70490 CT SOFT TISSUE NECK W/O DYE: CPT | Mod: 26

## 2023-04-20 PROCEDURE — 71250 CT THORAX DX C-: CPT

## 2023-04-24 ENCOUNTER — OUTPATIENT (OUTPATIENT)
Dept: OUTPATIENT SERVICES | Facility: HOSPITAL | Age: 68
LOS: 1 days | Discharge: HOME | End: 2023-04-24

## 2023-04-24 DIAGNOSIS — C83.10 MANTLE CELL LYMPHOMA, UNSPECIFIED SITE: ICD-10-CM

## 2023-04-24 DIAGNOSIS — Z98.890 OTHER SPECIFIED POSTPROCEDURAL STATES: Chronic | ICD-10-CM

## 2023-04-27 ENCOUNTER — RESULT REVIEW (OUTPATIENT)
Age: 68
End: 2023-04-27

## 2023-04-27 ENCOUNTER — APPOINTMENT (OUTPATIENT)
Dept: INFUSION THERAPY | Facility: HOSPITAL | Age: 68
End: 2023-04-27

## 2023-04-27 ENCOUNTER — APPOINTMENT (OUTPATIENT)
Dept: HEMATOLOGY ONCOLOGY | Facility: CLINIC | Age: 68
End: 2023-04-27
Payer: MEDICARE

## 2023-04-27 VITALS
BODY MASS INDEX: 31.54 KG/M2 | WEIGHT: 245.79 LBS | RESPIRATION RATE: 16 BRPM | DIASTOLIC BLOOD PRESSURE: 82 MMHG | SYSTOLIC BLOOD PRESSURE: 135 MMHG | HEIGHT: 74 IN | HEART RATE: 67 BPM | TEMPERATURE: 97.4 F | OXYGEN SATURATION: 97 %

## 2023-04-27 LAB
ALBUMIN SERPL ELPH-MCNC: 4.4 G/DL
ALP BLD-CCNC: 92 U/L
ALT SERPL-CCNC: 25 U/L
ANION GAP SERPL CALC-SCNC: 12 MMOL/L
AST SERPL-CCNC: 23 U/L
BASOPHILS # BLD AUTO: 0.04 K/UL — SIGNIFICANT CHANGE UP (ref 0–0.2)
BASOPHILS NFR BLD AUTO: 0.7 % — SIGNIFICANT CHANGE UP (ref 0–2)
BILIRUB SERPL-MCNC: 0.3 MG/DL
BUN SERPL-MCNC: 16 MG/DL
CALCIUM SERPL-MCNC: 9.8 MG/DL
CHLORIDE SERPL-SCNC: 104 MMOL/L
CO2 SERPL-SCNC: 24 MMOL/L
CREAT SERPL-MCNC: 1.16 MG/DL
EGFR: 68 ML/MIN/1.73M2
EOSINOPHIL # BLD AUTO: 0.05 K/UL — SIGNIFICANT CHANGE UP (ref 0–0.5)
EOSINOPHIL NFR BLD AUTO: 0.9 % — SIGNIFICANT CHANGE UP (ref 0–6)
GLUCOSE SERPL-MCNC: 104 MG/DL
HCT VFR BLD CALC: 43.6 % — SIGNIFICANT CHANGE UP (ref 39–50)
HGB BLD-MCNC: 14.5 G/DL — SIGNIFICANT CHANGE UP (ref 13–17)
IMM GRANULOCYTES NFR BLD AUTO: 0.2 % — SIGNIFICANT CHANGE UP (ref 0–0.9)
LDH SERPL-CCNC: 162 U/L
LYMPHOCYTES # BLD AUTO: 1.03 K/UL — SIGNIFICANT CHANGE UP (ref 1–3.3)
LYMPHOCYTES # BLD AUTO: 17.5 % — SIGNIFICANT CHANGE UP (ref 13–44)
MAGNESIUM SERPL-MCNC: 1.9 MG/DL
MCHC RBC-ENTMCNC: 28.7 PG — SIGNIFICANT CHANGE UP (ref 27–34)
MCHC RBC-ENTMCNC: 33.3 G/DL — SIGNIFICANT CHANGE UP (ref 32–36)
MCV RBC AUTO: 86.3 FL — SIGNIFICANT CHANGE UP (ref 80–100)
MONOCYTES # BLD AUTO: 0.5 K/UL — SIGNIFICANT CHANGE UP (ref 0–0.9)
MONOCYTES NFR BLD AUTO: 8.5 % — SIGNIFICANT CHANGE UP (ref 2–14)
NEUTROPHILS # BLD AUTO: 4.25 K/UL — SIGNIFICANT CHANGE UP (ref 1.8–7.4)
NEUTROPHILS NFR BLD AUTO: 72.2 % — SIGNIFICANT CHANGE UP (ref 43–77)
NRBC # BLD: 0 /100 WBCS — SIGNIFICANT CHANGE UP (ref 0–0)
PHOSPHATE SERPL-MCNC: 2.8 MG/DL
PLATELET # BLD AUTO: 235 K/UL — SIGNIFICANT CHANGE UP (ref 150–400)
POTASSIUM SERPL-SCNC: 4.4 MMOL/L
PROT SERPL-MCNC: 7 G/DL
RBC # BLD: 5.05 M/UL — SIGNIFICANT CHANGE UP (ref 4.2–5.8)
RBC # FLD: 13.1 % — SIGNIFICANT CHANGE UP (ref 10.3–14.5)
SODIUM SERPL-SCNC: 140 MMOL/L
URATE SERPL-MCNC: 6.9 MG/DL
WBC # BLD: 5.88 K/UL — SIGNIFICANT CHANGE UP (ref 3.8–10.5)
WBC # FLD AUTO: 5.88 K/UL — SIGNIFICANT CHANGE UP (ref 3.8–10.5)

## 2023-04-27 PROCEDURE — 99214 OFFICE O/P EST MOD 30 MIN: CPT | Mod: GC

## 2023-04-27 RX ORDER — SIMETHICONE 125 MG
125 CAPSULE ORAL
Qty: 28 | Refills: 0 | Status: DISCONTINUED | COMMUNITY
Start: 2021-09-09 | End: 2023-04-27

## 2023-04-27 RX ORDER — OXYCODONE 5 MG/1
5 TABLET ORAL
Qty: 15 | Refills: 0 | Status: DISCONTINUED | COMMUNITY
Start: 2022-08-03 | End: 2023-04-27

## 2023-04-27 RX ORDER — PREDNISONE 50 MG/1
50 TABLET ORAL
Qty: 30 | Refills: 0 | Status: COMPLETED | COMMUNITY
Start: 2021-11-04 | End: 2023-04-27

## 2023-04-27 RX ORDER — VALACYCLOVIR 500 MG/1
500 TABLET, FILM COATED ORAL
Qty: 90 | Refills: 0 | Status: DISCONTINUED | COMMUNITY
Start: 2021-08-26 | End: 2023-04-27

## 2023-04-27 RX ORDER — PROCHLORPERAZINE MALEATE 10 MG/1
10 TABLET ORAL
Qty: 60 | Refills: 2 | Status: DISCONTINUED | COMMUNITY
Start: 2021-08-26 | End: 2023-04-27

## 2023-04-27 RX ORDER — DIPHENHYDRAMINE HYDROCHLORIDE AND LIDOCAINE HYDROCHLORIDE AND ALUMINUM HYDROXIDE AND MAGNESIUM HYDRO
KIT
Qty: 1 | Refills: 1 | Status: DISCONTINUED | COMMUNITY
Start: 2021-09-09 | End: 2023-04-27

## 2023-04-30 NOTE — PHYSICAL EXAM
[Fully active, able to carry on all pre-disease performance without restriction] : Status 0 - Fully active, able to carry on all pre-disease performance without restriction [Normal] : affect appropriate [de-identified] : +R wrist pst surgical changes  s/p carpectomy, no tenderness [de-identified] : MO

## 2023-04-30 NOTE — ASSESSMENT
[FreeTextEntry1] : 68 year old male presenting for follow up on DLBCL, non-germinal center subtype. Patient had a sonoguided core biopsy of right lymph node of neck on 7/28/21 with pathology resulting in CD5, CD10 negative lymphoma, favor Diffuse large B-cell lymphoma, non-Germinal center subtype. Given the pathology results, he went for an excisional biopsy. PET/CT demonstrated the known R cervical node (SUV 30s) and possibly a 0.9 cm perigastric LN (SUV 5). PT under went excisional biopsy of right neck LN on 8/18. Path was consistent with DLBCL non-germinal center subtype. He is s/p 5 cycles R-CHOP on 11/22/21.\par \par #DLBCL non-germinal center subtype\par - Pt is s/p Cycle 1 R-CHOP on 8/30/21 \par - S/p C2 on 9/20/21 (PET/CT 10/2/21 showed response), C3 on 10/11/21, C4 on 11/1/21, C5 on 11/22/21\par - Repeat CT scan done 11/19/21 showed posttreatment changes to multiple right cervical nodes. No pathologically enlarged lymph nodes currently. Small scattered bilateral ground glass opacities, which may be infectious or inflammatory in etiology. No suspicious lymphadenopathy. \par - Was due for C6 on 12/13/2021 but was admitted to hospital for CAP requiring antibiotics and O2\par - Treatment deferred for C6 given symptoms and treatment delay\par - CT neck/C/A/P 10/29/22 without evidence of lymphoma \par - CT C/A/P April 2023 without lymphadenopathy. New pericardial effusion. Will seen for cardiology evaluation and obtain TTE\par - Discussed that the chances of relapse decreases as more time elapses since chemo treatment\par - Continue port flush Q1-3 months, will do one today 4/27/23\par - RTO in 3 months\par \par #Hx Carpal Tunnel syndrome\par - Not related to lymphoma\par - S/p R proximal carpectomy 08/2022\par - Continues to f/u with ortho hand\par \par Case discussed with Dr. Mayen\par Clemente Grimm M.D.\par Hematology Fellow PGY5

## 2023-04-30 NOTE — REASON FOR VISIT
[Follow-Up Visit] : a follow-up visit for [Spouse] : spouse [[employer] ] : provided by [employer]   [FreeTextEntry2] : DLBCL [Interpreters_IDNumber] : [id number] [TWNoteComboBox1] : Kazakh [Interpreters_FullName] : Carlos Eduardo

## 2023-04-30 NOTE — HISTORY OF PRESENT ILLNESS
[Treatment Protocol] : Treatment Protocol [de-identified] : Mr. Gregory Mercado is a 68 year old man with PMHx of Us esophagus, history of two knee replacements, hip replacements, neck surgery, arm surgery, and lower back surgery, He takes baclofen, nabumetone, naproxen, and sometimes Tylenol extra strength for pain. He also take omeprazole. Patient presents today for initial consultation for recent diagnosis of DLBCL, non-germinal center subtype. Patient had a sonoguided core biopsy of right lymph node of neck on 7/28/21 with pathology resulting in \par CD5, CD10 negative lymphoma, favor Diffuse large B-cell lymphoma, non-Germinal center subtype. \par \par Patient reports he had enlarged right neck mass that began growing 1 month ago. He reported this to his PCP, who proceeded the patient to have sono guide core biopsy. \par He is a retired  and is now a full time .\par \par Started R-CHOP 8/30/21. PET/CT on 10/2/2021- shows response to therapy. Pt had CT scans on neck/abd/pelvis done 11/19/21 which show Posttreatment changes to multiple right cervical nodes. No pathologically enlarged lymph nodes currently. Small scattered bilateral groundglass opacities, which may be infectious or inflammatory in etiology. No suspicious lymphadenopathy. He received Cycle 5 of R-CHOP chemotherapy on 1/122/21. On 12/3/21 developed new onset chest pain and shortness of breath. Was recommended to go to the ER, did not want to go, attempted to arrange outpatient CT PE which was unable to be done. he was admitted from 12/6/21-12/11/21 for new onset pleuritic chest pain and shortness of breath, found to have organizing pneumonia on CT imaging, improved with antibiotics. He is now off supplemental O2. Decision was made to hold further therapy in the setting of PNA. \par \par History: 1/14/22: Pt s/p cycle 5 of R-CHOP and tolerated treatment on 11/22/21. Cycle 6 held due to CAP. \par Pt has noticed new whole body itching, worse at night X 3 weeks. His wife has been using a new laundry softener for the pat month, no other new soaps/lotions/perfumes/foods. Benadryl helps with the itch. Denies fever/chills, No drenching night sweats. No CP/SOB. No lower extremity edema. Appetite is okay. Pt is gaining weight. No abd pain. No diarrhea/constipation. No hematuria, dysuria. No recent/recurrent infections. Energy levels have improved. Denies numbness/tingling of hands or feet. No mouth sores.\par \par 4/21/22: Mr. Mercado presents for a follow up visit. s/p cycle 5 of R-CHOP, tolerated treatment on 11/22/21. Cycle 6 held due to CAP. Since he was last seen in Jan 2022, he reports no new symptoms, including appetite changes, lymphadenopathy, fevers, chills, night sweats. He was seen by orthopedics for his right wrist swelling; surgical options were discussed, but decision was made to wait until Mediport was removed before proceeding with surgery. He reports that pain is stable. Seen by his PMD, blood work done to r/o autoimmune arthritis; he was concerned since his REILLY was 1:80. Reviewed blood work with him this visit. He is not currently taking any meds aside from Tylenol for his wrist pain and omeprazole for gastric reflux.\par \par 7/28/22: Since his last visit he continues to feel well. No new lymphadenopathy, appetite changes, fevers, chills or night sweats. He is going to have surgery on his R hand with Dr. Ashford on August 4. No recent infections. He has noticed that he loses his voice when he talks a lot for the last month but overall it isn't bothersome.\par \par 10/25/22: Patient presents for follow up. He is overall doing very well. He denies significant complaints except some mild numbness in his hands at night. He had R hand surgery (carpectomy) in August successfully. He is doing hand physical therapy. He denies pain, N/V, diarrhea, constipation, fevers, chills, night sweats, unintentional weight loss or poor appetite, swelling, rash, or new nodules or lumps.  [FreeTextEntry1] : R-CHOP: 8/30/21- 11/22/21 (5 cycles) [de-identified] : 4/27/23: Since last visit in January, he states he has been feeling well overall. No longer having R hand pain since his surgery 8 months ago. Last visit had L foot pain that has resolved. He denies fevers, night sweats, wt loss, lymphadenopathy, N/V/C/D, Ab pain, chest pain, SOB, ED visits, infections, or hospitalizations. \par He endorses chronic upper back pain follows with Dr. Syed.\par

## 2023-04-30 NOTE — END OF VISIT
[] : Fellow [FreeTextEntry3] : Patient seen and case discussed with Dr Grimm. I agree with above and have edited the note where needed.\par  [Time Spent: ___ minutes] : I have spent [unfilled] minutes of time on the encounter.

## 2023-05-04 ENCOUNTER — APPOINTMENT (OUTPATIENT)
Dept: CARDIOLOGY | Facility: CLINIC | Age: 68
End: 2023-05-04
Payer: MEDICARE

## 2023-05-04 PROCEDURE — 93306 TTE W/DOPPLER COMPLETE: CPT

## 2023-05-09 ENCOUNTER — APPOINTMENT (OUTPATIENT)
Dept: NEUROSURGERY | Facility: CLINIC | Age: 68
End: 2023-05-09
Payer: MEDICARE

## 2023-05-09 VITALS
HEART RATE: 65 BPM | OXYGEN SATURATION: 96 % | SYSTOLIC BLOOD PRESSURE: 123 MMHG | TEMPERATURE: 97.9 F | DIASTOLIC BLOOD PRESSURE: 85 MMHG

## 2023-05-09 DIAGNOSIS — M25.519 PAIN IN UNSPECIFIED SHOULDER: ICD-10-CM

## 2023-05-09 DIAGNOSIS — M54.12 RADICULOPATHY, CERVICAL REGION: ICD-10-CM

## 2023-05-09 DIAGNOSIS — Z98.1 ARTHRODESIS STATUS: ICD-10-CM

## 2023-05-09 DIAGNOSIS — M43.12 SPONDYLOLISTHESIS, CERVICAL REGION: ICD-10-CM

## 2023-05-09 PROCEDURE — 99213 OFFICE O/P EST LOW 20 MIN: CPT

## 2023-05-11 NOTE — ASSESSMENT
[FreeTextEntry1] : Mr. Mercado is a 66 y/o, male, with a hx of ACDF C4-C5 x 7 years ago at [facility] withworsening RUE and R shoulder blade pain. PAtient with 4/5 weakness of RUE. Xray shoulder with no significant tear. Xray cervical with spondylolisthesis of adjacent level. \par - Will obtain MRI cervical spine to r/o stenosis and adjacent level disease, herniations of C spine \par - Pt to refrain from physical therapy as it worsened symptoms \par - Continue with Tylenol for pain\par - F/u once MRI is completed. \par \par

## 2023-05-11 NOTE — REVIEW OF SYSTEMS
[As Noted in HPI] : as noted in HPI [Numbness] : numbness [Tingling] : tingling [Abnormal Sensation] : an abnormal sensation [Negative] : Heme/Lymph [de-identified] : neck and shoulder blade pain

## 2023-05-11 NOTE — HISTORY OF PRESENT ILLNESS
[FreeTextEntry1] : upper back pain  [de-identified] : 05/09/23: Mr. Mercado is a 69 y/o, male with a hx of ACDF C4-C5, here for f/u to review recent xray cervical spine. He reports since last office visit he underwent x 5 sessions of PT however, the exercises they were making him do were very strenuous and had to d/c due to his neck pain worsening and developing new pain radiating to R shoulder blade. He has numbness and tingling of RUE. Xray with instrumentation intact and adjacent level spondylolisthesis. He is currently taking acetaminophen for his symptoms. \par \par 3/2/23: Mr. Mercado is a 66 y/o, male, with a hx of large cell lymphoma s/p CT in remission, B/L THR, TKR, shoulder replacements, ACDF C4-C5 x 7 years ago at [facility]. He reports that prior to ACDF he had neck pain radiating to b/l UE which resolved post op. He is c/o today of worsening R shoulder blade, upper back, UE pain along with numbness, tingling, weakness of b/l arms. He underwent MRI thoracic spine which signified mild thoracic herniations. Denies any radiating pain to ribs, chest. He has not undergone any recent physical therapy. He is currently on ibuprofen for his pain.

## 2023-05-11 NOTE — RESULTS/DATA
[FreeTextEntry1] : \par COMPARISON:  8/5/2021\par \par FINDINGS:\par There is an anterior fusion at the C4-5 level. The fixation hardware is intact and there is no evidence of loosening.\par \par There is no fracture.\par \par There is a 0.4 cm grade 1 anterospondylolisthesis at the C5-6 level. There is no increased subluxation during flexion or extension.\par \par Generalized osteopenia is noted.\par \par The intervertebral disc spaces are preserved above and below the fusion. There is degenerative spondylosis at C5-6..\par \par The prevertebral soft tissues are unremarkable.\par \par The airway is patent. \par \par IMPRESSION:\par 1. Anterior fusion at C4-5.\par 2. Grade 1 anterospondylolisthesis at the C5-6 level without increased subluxation during flexion or extension.

## 2023-05-18 DIAGNOSIS — Z78.9 OTHER SPECIFIED HEALTH STATUS: ICD-10-CM

## 2023-05-18 RX ORDER — HYDROXYZINE HYDROCHLORIDE 10 MG/1
10 TABLET ORAL 3 TIMES DAILY
Qty: 30 | Refills: 2 | Status: DISCONTINUED | COMMUNITY
Start: 2022-01-07 | End: 2023-05-18

## 2023-05-18 RX ORDER — NALOXONE HYDROCHLORIDE 4 MG/.1ML
4 SPRAY NASAL
Qty: 1 | Refills: 0 | Status: DISCONTINUED | COMMUNITY
Start: 2022-08-03 | End: 2023-05-18

## 2023-05-18 RX ORDER — LIDOCAINE AND PRILOCAINE 25; 25 MG/G; MG/G
2.5-2.5 CREAM TOPICAL
Qty: 1 | Refills: 3 | Status: DISCONTINUED | COMMUNITY
Start: 2021-08-26 | End: 2023-05-18

## 2023-05-18 RX ORDER — SALIVA SUBSTITUTE COMBO NO.9
MOUTHWASH MUCOUS MEMBRANE
Qty: 1 | Refills: 0 | Status: DISCONTINUED | COMMUNITY
Start: 2021-09-09 | End: 2023-05-18

## 2023-05-18 RX ORDER — ACETAMINOPHEN 500 MG/1
500 TABLET ORAL EVERY 6 HOURS
Qty: 120 | Refills: 0 | Status: DISCONTINUED | COMMUNITY
Start: 2022-08-03 | End: 2023-05-18

## 2023-05-18 RX ORDER — ACETAMINOPHEN 325 MG/1
TABLET, FILM COATED ORAL
Refills: 0 | Status: DISCONTINUED | COMMUNITY
End: 2023-05-18

## 2023-05-18 RX ORDER — NAPROXEN 500 MG/1
TABLET ORAL
Refills: 0 | Status: DISCONTINUED | COMMUNITY
End: 2023-05-18

## 2023-05-18 RX ORDER — BACLOFEN 20 MG/1
20 TABLET ORAL
Refills: 0 | Status: DISCONTINUED | COMMUNITY
End: 2023-05-18

## 2023-05-18 RX ORDER — ONDANSETRON 8 MG/1
8 TABLET, ORALLY DISINTEGRATING ORAL
Qty: 90 | Refills: 6 | Status: DISCONTINUED | COMMUNITY
Start: 2021-08-26 | End: 2023-05-18

## 2023-05-18 RX ORDER — IBUPROFEN 800 MG/1
800 TABLET, FILM COATED ORAL EVERY 8 HOURS
Qty: 90 | Refills: 0 | Status: DISCONTINUED | COMMUNITY
Start: 2022-08-03 | End: 2023-05-18

## 2023-05-21 PROBLEM — R23.2 FACIAL FLUSHING: Status: RESOLVED | Noted: 2021-09-09 | Resolved: 2023-05-21

## 2023-05-21 PROBLEM — Z23 ENCOUNTER FOR IMMUNIZATION: Status: RESOLVED | Noted: 2022-02-11 | Resolved: 2023-05-21

## 2023-05-21 PROBLEM — T78.40XA HYPERSENSITIVITY REACTION, INITIAL ENCOUNTER: Status: RESOLVED | Noted: 2021-09-09 | Resolved: 2023-05-21

## 2023-05-21 PROBLEM — Z87.01 HISTORY OF COMMUNITY ACQUIRED PNEUMONIA: Status: RESOLVED | Noted: 2021-12-20 | Resolved: 2023-05-21

## 2023-05-21 PROBLEM — L29.8 ITCHING DUE TO DRUG: Status: RESOLVED | Noted: 2021-09-09 | Resolved: 2023-05-21

## 2023-05-22 ENCOUNTER — APPOINTMENT (OUTPATIENT)
Dept: CARDIOLOGY | Facility: CLINIC | Age: 68
End: 2023-05-22
Payer: MEDICARE

## 2023-05-22 ENCOUNTER — RESULT REVIEW (OUTPATIENT)
Age: 68
End: 2023-05-22

## 2023-05-22 ENCOUNTER — LABORATORY RESULT (OUTPATIENT)
Age: 68
End: 2023-05-22

## 2023-05-22 VITALS
HEIGHT: 74 IN | WEIGHT: 244.71 LBS | OXYGEN SATURATION: 98 % | HEART RATE: 68 BPM | DIASTOLIC BLOOD PRESSURE: 82 MMHG | BODY MASS INDEX: 31.41 KG/M2 | SYSTOLIC BLOOD PRESSURE: 143 MMHG | RESPIRATION RATE: 16 BRPM

## 2023-05-22 DIAGNOSIS — L29.8 OTHER PRURITUS: ICD-10-CM

## 2023-05-22 DIAGNOSIS — Z87.01 PERSONAL HISTORY OF PNEUMONIA (RECURRENT): ICD-10-CM

## 2023-05-22 DIAGNOSIS — T50.905A OTHER PRURITUS: ICD-10-CM

## 2023-05-22 DIAGNOSIS — R23.2 FLUSHING: ICD-10-CM

## 2023-05-22 DIAGNOSIS — Z23 ENCOUNTER FOR IMMUNIZATION: ICD-10-CM

## 2023-05-22 DIAGNOSIS — T78.40XA ALLERGY, UNSPECIFIED, INITIAL ENCOUNTER: ICD-10-CM

## 2023-05-22 LAB
ALBUMIN SERPL ELPH-MCNC: 4.1 G/DL
ALP BLD-CCNC: 90 U/L
ALT SERPL-CCNC: 20 U/L
ANION GAP SERPL CALC-SCNC: 13 MMOL/L
AST SERPL-CCNC: 18 U/L
BILIRUB SERPL-MCNC: 0.3 MG/DL
BUN SERPL-MCNC: 12 MG/DL
CALCIUM SERPL-MCNC: 9.5 MG/DL
CHLORIDE SERPL-SCNC: 104 MMOL/L
CO2 SERPL-SCNC: 25 MMOL/L
CREAT SERPL-MCNC: 1.06 MG/DL
CRP SERPL HS-MCNC: 1.64 MG/L
EGFR: 76 ML/MIN/1.73M2
ERYTHROCYTE [SEDIMENTATION RATE] IN BLOOD: 7 MM/HR — SIGNIFICANT CHANGE UP (ref 0–20)
ESTIMATED AVERAGE GLUCOSE: 114 MG/DL
GLUCOSE SERPL-MCNC: 92 MG/DL
HBA1C MFR BLD HPLC: 5.6 %
NT-PROBNP SERPL-MCNC: 122 PG/ML
POTASSIUM SERPL-SCNC: 4.4 MMOL/L
PROT SERPL-MCNC: 6.9 G/DL
RHEUMATOID FACT SER QL: <10 IU/ML
SODIUM SERPL-SCNC: 142 MMOL/L
TROPONIN-T, HIGH SENSITIVITY: 9 NG/L
TSH SERPL-ACNC: 1.32 UIU/ML

## 2023-05-22 PROCEDURE — 93000 ELECTROCARDIOGRAM COMPLETE: CPT

## 2023-05-22 PROCEDURE — 99205 OFFICE O/P NEW HI 60 MIN: CPT

## 2023-05-22 RX ORDER — NABUMETONE 750 MG/1
750 TABLET, FILM COATED ORAL
Refills: 0 | Status: ACTIVE | COMMUNITY

## 2023-05-22 RX ORDER — OMEPRAZOLE 40 MG/1
40 CAPSULE, DELAYED RELEASE ORAL
Refills: 0 | Status: ACTIVE | COMMUNITY

## 2023-05-22 NOTE — REASON FOR VISIT
[Spouse] : spouse [[employer] ] : provided by [employer]   [Time Spent: ____ minutes] : Total time spent using  services: [unfilled] minutes. The patient's primary language is not English thus required  services. [FreeTextEntry3] : Dr. Mayen [Interpreters_IDNumber] : [id number] [Interpreters_FullName] : Benito [TWNoteComboBox1] : Slovak [FreeTextEntry1] : BAO SUTHERLAND is a 68 year old man with a history of joint disease (s/p bilateral hip replacement, shoulder, wrist, and knee surgery) and DLBCL in 2021 referred for pericardial effusion\par \par Prior Cancer Treatments:\par ------------------------------------------------------------------------\par Chemo/targeted therapy:\par 8/30/21-11/22/21 KEE (x5) \par ------------------------------------------------------------------------\par

## 2023-05-22 NOTE — ASSESSMENT
[FreeTextEntry1] : 68 year old man with new pericardial effusion on CT and echo, no evidence of hemodynamic effect. Possible the symptoms of shortness of breath and gasping which he first noted a few months ago are related, but less likely given size of effusion. Possible autoimmune inflammatory effusion, given symmetric polyarticular arthritis and NHL.\par \par We discussed the common etiology, natural history, and approach to monitoring incidental pericardial effusions.\par Given history of anthracycline chemotherapy (5 cycles R CHOP), at risk for late cardiomyopathy from anthracycline.\par \par \par 1. Pericardial effusion\par 2. Risk for cardiomyopathy\par 3. atherosclerotic calcifications on chest CT\par \par - Will check ESR/CRP REILLY ,and RF now\par - Will follow up serial echo in one month to monitor stability\par - Will check pro-BNP and troponin now for risk stratification\par - continue surveillance by echo and ASCVD risk factor control\par - if elevated inflammatory markers, will discuss statin therapy\par \par Follow up in 3 months with me.\par \par Above discussed with the patient and his wife and all questions answered to the best of my ability and to their apparent satisfaction.

## 2023-05-22 NOTE — PHYSICAL EXAM
[Normal] : normal conjunctiva [No Carotid Bruit] : no carotid bruit [Normal S1, S2] : normal S1, S2 [No Murmur] : no murmur [No Rub] : no rub [No Gallop] : no gallop [Clear Lung Fields] : clear lung fields [Good Air Entry] : good air entry [No Respiratory Distress] : no respiratory distress  [Soft] : abdomen soft [Normal Gait] : normal gait [Gait - Sufficient for Exercise Testing] : gait - sufficient for exercise testing [No Edema] : no edema [No Cyanosis] : no cyanosis [No Clubbing] : no clubbing [No Varicosities] : no varicosities [No Rash] : no rash [Moves all extremities] : moves all extremities [No Focal Deficits] : no focal deficits [Alert and Oriented] : alert and oriented

## 2023-05-22 NOTE — HISTORY OF PRESENT ILLNESS
[FreeTextEntry1] : Following the 5th cycle of RCHOP for lymphoma, he developed pleuritic chest pain. cough, and shortness of breath. He was  admitted to [facility] in Dec 2021 and found on CT Chest to have peripheral lower lobe-predominant ill-defined ground glass densities bilaterally. He was treated with antibiotics and symptoms improved, infectious workup identified no causative organism. He was discharged with home oxygen due to nocturnal desaturation, but  no longer uses it.\par \par On routine follow up CT C/A/P done in 2023 for surveillance after treatment for B cell lymphoma there was a new/enlarged pericardial effusion, which was not present in 2022. There was no lymphadenopathy. An echocardiogram was obtained showing small pericardial effusion, anterior to the right ventricle. LV function was normal.\par \par Compared to pre-chemotherapy, pericardial effusion is new.\par \par He feels well, but does note occasional difficulty catching his breath, which began about 4 months ago. 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 The shortness of breath occurs both with exertion and when laying flat on his back. He denies edema, chest pain, palpitations, dizziness, pleurisy, cough, fever, or diarrhea/GI upset.\par \par He is not aware of hypertension, diabetes, hypercholesterolemia, and was not a smoker. His father  of a heart problem at age 70. History of bilateral hip replacement, operations on both shoulders, the right wrist, both knees, and cervical spine.\par \par He is a retired  and is now a . Lives in [place] with his wife.\par \par Cardiovascular Summary:\par ----------------------------------------------\par ECG:\par 2023: NSR, possible old septal infarct\par ----------------------------------------------\par Echo:\par 2023: EF 62 %, trace pericardial effusion, mildly increased LV wall thickness\par 2021: EF 60 %, no pericardial effusion\par ----------------------------------------------\par CT:\par 2023: small pericardial effusion, enlarged sine 10/29/22, new since , atherosclerotic calcifications

## 2023-05-23 LAB
ANA PAT FLD IF-IMP: ABNORMAL
ANA SER IF-ACNC: ABNORMAL

## 2023-05-23 NOTE — PATIENT PROFILE ADULT - SW.
Casi Hall is for  people with more severe  osteoporosis it is taken monthly for 1 year then pt goes back on prolia every 6 mo social work

## 2023-05-27 ENCOUNTER — APPOINTMENT (OUTPATIENT)
Dept: MRI IMAGING | Facility: IMAGING CENTER | Age: 68
End: 2023-05-27
Payer: MEDICARE

## 2023-05-27 ENCOUNTER — OUTPATIENT (OUTPATIENT)
Dept: OUTPATIENT SERVICES | Facility: HOSPITAL | Age: 68
LOS: 1 days | End: 2023-05-27
Payer: COMMERCIAL

## 2023-05-27 DIAGNOSIS — Z98.890 OTHER SPECIFIED POSTPROCEDURAL STATES: Chronic | ICD-10-CM

## 2023-05-27 DIAGNOSIS — M54.12 RADICULOPATHY, CERVICAL REGION: ICD-10-CM

## 2023-05-27 PROCEDURE — 72141 MRI NECK SPINE W/O DYE: CPT

## 2023-05-27 PROCEDURE — 72141 MRI NECK SPINE W/O DYE: CPT | Mod: 26

## 2023-06-02 ENCOUNTER — NON-APPOINTMENT (OUTPATIENT)
Age: 68
End: 2023-06-02

## 2023-06-22 ENCOUNTER — APPOINTMENT (OUTPATIENT)
Dept: NEUROSURGERY | Facility: CLINIC | Age: 68
End: 2023-06-22
Payer: MEDICARE

## 2023-06-22 VITALS
HEIGHT: 74 IN | TEMPERATURE: 98 F | OXYGEN SATURATION: 97 % | HEART RATE: 74 BPM | DIASTOLIC BLOOD PRESSURE: 85 MMHG | BODY MASS INDEX: 31.32 KG/M2 | WEIGHT: 244 LBS | SYSTOLIC BLOOD PRESSURE: 125 MMHG

## 2023-06-22 PROCEDURE — 99213 OFFICE O/P EST LOW 20 MIN: CPT

## 2023-06-27 ENCOUNTER — APPOINTMENT (OUTPATIENT)
Dept: CARDIOLOGY | Facility: CLINIC | Age: 68
End: 2023-06-27
Payer: MEDICARE

## 2023-06-27 PROCEDURE — 93306 TTE W/DOPPLER COMPLETE: CPT

## 2023-06-29 NOTE — REASON FOR VISIT
[Follow-Up: _____] : a [unfilled] follow-up visit [Spouse] : spouse [FreeTextEntry1] : The patient is here for review of a new MRI of the cervical spine.

## 2023-06-29 NOTE — PHYSICAL EXAM
[General Appearance - Alert] : alert [General Appearance - In No Acute Distress] : in no acute distress [Oriented To Time, Place, And Person] : oriented to person, place, and time [Impaired Insight] : insight and judgment were intact [Affect] : the affect was normal [0] : Triceps left 0 [Normal] : normal [4] : 4/5 Elbow Extensor (C7) [5] : 5/5 Finger Flexors (C8) [Brink] : Brink's sign was not demonstrated [FreeTextEntry6] : Left deltoid strength 4/5 and is pain limited.

## 2023-06-29 NOTE — ASSESSMENT
[FreeTextEntry1] : He has left arm pain and neck pain. He has DJD at C5-6 on the left. I think it is the source of the majority of pain. I will have him do pain management.

## 2023-06-29 NOTE — HISTORY OF PRESENT ILLNESS
[FreeTextEntry1] : upper back pain  [de-identified] : 06/22/2023: Mr. Mercado is a 69 y/o male with a h/o a C4-C5 ACDF 7 years ago and is here for review of a new MRI of the cervical spine.  He continues with pain down the left arm to the elbow and to the left side of his mid and upper back.\par \par 05/09/23: Mr. Mercado is a 69 y/o, male with a hx of ACDF C4-C5, here for f/u to review recent xray cervical spine. He reports since last office visit he underwent x 5 sessions of PT however, the exercises they were making him do were very strenuous and had to d/c due to his neck pain worsening and developing new pain radiating to R shoulder blade. He has numbness and tingling of RUE. Xray with instrumentation intact and adjacent level spondylolisthesis. He is currently taking acetaminophen for his symptoms. \par \par 3/2/23: Mr. Mercado is a 66 y/o, male, with a hx of large cell lymphoma s/p CT in remission, B/L THR, TKR, shoulder replacements, ACDF C4-C5 x 7 years ago at [facility]. He reports that prior to ACDF he had neck pain radiating to b/l UE which resolved post op. He is c/o today of worsening R shoulder blade, upper back, UE pain along with numbness, tingling, weakness of b/l arms. He underwent MRI thoracic spine which signified mild thoracic herniations. Denies any radiating pain to ribs, chest. He has not undergone any recent physical therapy. He is currently on ibuprofen for his pain.

## 2023-07-26 ENCOUNTER — OUTPATIENT (OUTPATIENT)
Dept: OUTPATIENT SERVICES | Facility: HOSPITAL | Age: 68
LOS: 1 days | Discharge: HOME | End: 2023-07-26

## 2023-07-26 DIAGNOSIS — Z98.890 OTHER SPECIFIED POSTPROCEDURAL STATES: Chronic | ICD-10-CM

## 2023-07-26 DIAGNOSIS — C83.10 MANTLE CELL LYMPHOMA, UNSPECIFIED SITE: ICD-10-CM

## 2023-07-28 ENCOUNTER — APPOINTMENT (OUTPATIENT)
Dept: INFUSION THERAPY | Facility: HOSPITAL | Age: 68
End: 2023-07-28

## 2023-07-31 DIAGNOSIS — C85.80 OTHER SPECIFIED TYPES OF NON-HODGKIN LYMPHOMA, UNSPECIFIED SITE: ICD-10-CM

## 2023-08-04 ENCOUNTER — RESULT REVIEW (OUTPATIENT)
Age: 68
End: 2023-08-04

## 2023-08-04 ENCOUNTER — APPOINTMENT (OUTPATIENT)
Dept: HEMATOLOGY ONCOLOGY | Facility: CLINIC | Age: 68
End: 2023-08-04
Payer: MEDICARE

## 2023-08-04 LAB
BASOPHILS # BLD AUTO: 0.05 K/UL — SIGNIFICANT CHANGE UP (ref 0–0.2)
BASOPHILS NFR BLD AUTO: 0.9 % — SIGNIFICANT CHANGE UP (ref 0–2)
EOSINOPHIL # BLD AUTO: 0.06 K/UL — SIGNIFICANT CHANGE UP (ref 0–0.5)
EOSINOPHIL NFR BLD AUTO: 1 % — SIGNIFICANT CHANGE UP (ref 0–6)
HCT VFR BLD CALC: 43.7 % — SIGNIFICANT CHANGE UP (ref 39–50)
HGB BLD-MCNC: 14.9 G/DL — SIGNIFICANT CHANGE UP (ref 13–17)
IMM GRANULOCYTES NFR BLD AUTO: 0.2 % — SIGNIFICANT CHANGE UP (ref 0–0.9)
LYMPHOCYTES # BLD AUTO: 1.22 K/UL — SIGNIFICANT CHANGE UP (ref 1–3.3)
LYMPHOCYTES # BLD AUTO: 21.3 % — SIGNIFICANT CHANGE UP (ref 13–44)
MCHC RBC-ENTMCNC: 29 PG — SIGNIFICANT CHANGE UP (ref 27–34)
MCHC RBC-ENTMCNC: 34.1 G/DL — SIGNIFICANT CHANGE UP (ref 32–36)
MCV RBC AUTO: 85.2 FL — SIGNIFICANT CHANGE UP (ref 80–100)
MONOCYTES # BLD AUTO: 0.55 K/UL — SIGNIFICANT CHANGE UP (ref 0–0.9)
MONOCYTES NFR BLD AUTO: 9.6 % — SIGNIFICANT CHANGE UP (ref 2–14)
NEUTROPHILS # BLD AUTO: 3.84 K/UL — SIGNIFICANT CHANGE UP (ref 1.8–7.4)
NEUTROPHILS NFR BLD AUTO: 67 % — SIGNIFICANT CHANGE UP (ref 43–77)
NRBC # BLD: 0 /100 WBCS — SIGNIFICANT CHANGE UP (ref 0–0)
PLATELET # BLD AUTO: 251 K/UL — SIGNIFICANT CHANGE UP (ref 150–400)
RBC # BLD: 5.13 M/UL — SIGNIFICANT CHANGE UP (ref 4.2–5.8)
RBC # FLD: 13 % — SIGNIFICANT CHANGE UP (ref 10.3–14.5)
WBC # BLD: 5.73 K/UL — SIGNIFICANT CHANGE UP (ref 3.8–10.5)
WBC # FLD AUTO: 5.73 K/UL — SIGNIFICANT CHANGE UP (ref 3.8–10.5)

## 2023-08-04 PROCEDURE — 99213 OFFICE O/P EST LOW 20 MIN: CPT

## 2023-08-07 LAB
ALBUMIN SERPL ELPH-MCNC: 4.3 G/DL
ALP BLD-CCNC: 94 U/L
ALT SERPL-CCNC: 27 U/L
ANION GAP SERPL CALC-SCNC: 13 MMOL/L
AST SERPL-CCNC: 23 U/L
BILIRUB SERPL-MCNC: 0.3 MG/DL
BUN SERPL-MCNC: 11 MG/DL
CALCIUM SERPL-MCNC: 9.5 MG/DL
CHLORIDE SERPL-SCNC: 105 MMOL/L
CO2 SERPL-SCNC: 23 MMOL/L
CREAT SERPL-MCNC: 1.04 MG/DL
EGFR: 78 ML/MIN/1.73M2
GLUCOSE SERPL-MCNC: 117 MG/DL
LDH SERPL-CCNC: 179 U/L
POTASSIUM SERPL-SCNC: 4.2 MMOL/L
PROT SERPL-MCNC: 6.9 G/DL
SODIUM SERPL-SCNC: 141 MMOL/L

## 2023-08-07 NOTE — ASSESSMENT
[FreeTextEntry1] : 68-year-old male presenting for follow up on DLBCL, non-germinal center subtype. Patient had a sonoguided core biopsy of right lymph node of neck on 7/28/21 with pathology resulting in CD5, CD10 negative lymphoma, favor Diffuse large B-cell lymphoma, non-Germinal center subtype. Given the pathology results, he went for an excisional biopsy. PET/CT demonstrated the known R cervical node (SUV 30s) and possibly a 0.9 cm perigastric LN (SUV 5). PT underwent excisional biopsy of right neck LN on 8/18. Path was consistent with DLBCL non-germinal center subtype. He is s/p 5 cycles R-CHOP on 11/22/21. As of most recent imaging on 4/20/2023. patient remains in CR. A small pericardial effusion that has slightly increased since 10/29/2022. Will follow up.   #DLBCL non-germinal center subtype - Pt is s/p Cycle 1 R-CHOP on 8/30/21  - S/p C2 on 9/20/21 (PET/CT 10/2/21 showed response), C3 on 10/11/21, C4 on 11/1/21, C5 on 11/22/21 - Repeat CT scan done 11/19/21 showed posttreatment changes to multiple right cervical nodes. No pathologically enlarged lymph nodes currently. Small scattered bilateral ground glass opacities, which may be infectious or inflammatory in etiology. No suspicious lymphadenopathy.  - Was due for C6 on 12/13/2021 but was admitted to hospital for CAP requiring antibiotics and O2 - Treatment deferred for C6 given symptoms and treatment delay - CT neck/C/A/P 10/29/22 without evidence of lymphoma  - CT C/A/P April 2023 without lymphadenopathy. New pericardial effusion. Will seen for cardiology evaluation and obtain TTE - Discussed that the chances of relapse decreases as more time elapses since chemo treatment - Continue port flush Q1-3 months - Next imaging in 10/2023 - RTC in 3 months (after imaging)

## 2023-08-07 NOTE — REASON FOR VISIT
[Follow-Up Visit] : a follow-up visit for [Spouse] : spouse [[employer] ] : provided by [employer]   [FreeTextEntry2] : DLBCL [Interpreters_IDNumber] : [id number] [Interpreters_FullName] : Carlos Eduardo

## 2023-08-07 NOTE — PHYSICAL EXAM
[Fully active, able to carry on all pre-disease performance without restriction] : Status 0 - Fully active, able to carry on all pre-disease performance without restriction [Normal] : affect appropriate [de-identified] : MO [de-identified] : +R wrist pst surgical changes  s/p carpectomy, no tenderness

## 2023-08-07 NOTE — RESULTS/DATA
[FreeTextEntry1] : 8/4/2023  Today's labs reviewed Normal range CBC, CMP, LDH  IMAGING  EXAM: [id number] - CT NECK SOFT TISSUE  - ORDERED BY: ALYCIA BHATIA  PROCEDURE DATE:  04/20/2023  INTERPRETATION:  CT neck without contrast  CLINICAL INFORMATION: B-cell lymphoma follow-up  TECHNIQUE:  Contiguous axial 3 mm thick sections were obtained through the neck using single helical acquisition.   3 mm sagittal and coronal reconstructions were obtained.  This scan was performed using automatic exposure control (radiation dose reduction software) to obtain a diagnostic image quality scan with patient dose as low as reasonably achievable.  FINDINGS:   CT dated 10/29/2022 available for review.  No neck mass is found.  No pathologically enlarged lymph nodes are found.  The visualized lymph nodes demonstrate no central necrosis or extranodal extension, allowing for the noncontrast technique.  The mucosal surfaces of the upper aerodigestive tract appear symmetric and unremarkable.  The larynx is intact.  The preepiglottic and paralaryngeal spaces are intact.  Laryngeal cartilages remain intact.  The nasopharynx is symmetric.  No lateral retropharyngeal mass is found.  The underlying central skull base is intact.  The petrous temporal bones including mastoid air cells are intact.  The visualized base of brain appears unremarkable.  The parotid and submandibular glands are intact.  The thyroid gland is intact.  The visualized paranasal sinuses are clear.  The nasal cavity is unremarkable.  The cervical spine shows ACDF at C4-5 and diffuse degenerative disc disease and spondylosis.  The lung apices are clear, allowing for the for the neck CT technique.   IMPRESSION: No neck mass or lymphadenopathy is noted. Stable examination.  --- End of Report --- --------------------------------------------------------------------------------------------  EXAM: [id number] - CT ABDOMEN AND PELVIS OC  - ORDERED BY: ALYCIA BHATIA  EXAM: [id number] - CT CHEST  - ORDERED BY: ALYCIA BHATIA   PROCEDURE DATE:  04/20/2023    INTERPRETATION:  CLINICAL INFORMATION: History of diffuse large B-cell lymphoma. Evaluate for recurrent disease.  COMPARISON: CT 10/29/2022, 4/30/2022  CONTRAST/COMPLICATIONS: IV Contrast: None Oral Contrast: NONE Complications: None reported at time of study completion  PROCEDURE: CT of the Chest, Abdomen and Pelvis was performed. Sagittal and coronal reformats were performed.  FINDINGS: Evaluation of the solid organs and vasculature is limited in the absence of intravenous contrast. CHEST: LUNGS AND LARGE AIRWAYS: Patent central airways. No pulmonary nodules. PLEURA: No pleural effusion. VESSELS: Within normal limits. HEART: Heart size is normal. Small pericardial effusion, slightly enlarged since 10/29/2022 and new since 4/30/2022. MEDIASTINUM AND VISHNU: No lymphadenopathy. CHEST WALL AND LOWER NECK: Right chest wall port with catheter tip in the SVC.  ABDOMEN AND PELVIS: LIVER: Normal in size and morphology with small scattered cysts again noted. BILE DUCTS: Normal caliber. GALLBLADDER: Within normal limits. SPLEEN: Within normal limits. PANCREAS: Within normal limits. ADRENALS: Within normal limits. KIDNEYS/URETERS: Bilateral renal cortical cysts and right parapelvic cyst. No hydronephrosis or renal calculi.  BLADDER: Within normal limits. REPRODUCTIVE ORGANS: The prostate is completely obscured by streak artifact from the hip hardware.  BOWEL: No bowel obstruction. Appendix is not visualized. PERITONEUM: No ascites. VESSELS: Scattered atherosclerotic calcifications. RETROPERITONEUM/LYMPH NODES: No lymphadenopathy. ABDOMINAL WALL: Within normal limits. BONES: Degenerative changes. Bilateral hip arthroplasty. Chronic left lateral fifth rib fracture deformity.  IMPRESSION: No recurrent adenopathy in the chest, abdomen and pelvis.  A nonspecific small pericardial effusion is slightly enlarged since 10/29/2022 and new since 4/30/2022.

## 2023-08-07 NOTE — HISTORY OF PRESENT ILLNESS
[Treatment Protocol] : Treatment Protocol [de-identified] : Mr. Gregory Mercado is a 68 year old man with PMHx of Us esophagus, history of two knee replacements, hip replacements, neck surgery, arm surgery, and lower back surgery, He takes baclofen, nabumetone, naproxen, and sometimes Tylenol extra strength for pain. He also take omeprazole. Patient presents today for initial consultation for recent diagnosis of DLBCL, non-germinal center subtype. Patient had a sonoguided core biopsy of right lymph node of neck on 7/28/21 with pathology resulting in \par  CD5, CD10 negative lymphoma, favor Diffuse large B-cell lymphoma, non-Germinal center subtype. \par  \par  Patient reports he had enlarged right neck mass that began growing 1 month ago. He reported this to his PCP, who proceeded the patient to have sono guide core biopsy. \par  He is a retired  and is now a full time .\par  \par  Started R-CHOP 8/30/21. PET/CT on 10/2/2021- shows response to therapy. Pt had CT scans on neck/abd/pelvis done 11/19/21 which show Posttreatment changes to multiple right cervical nodes. No pathologically enlarged lymph nodes currently. Small scattered bilateral groundglass opacities, which may be infectious or inflammatory in etiology. No suspicious lymphadenopathy. He received Cycle 5 of R-CHOP chemotherapy on 1/122/21. On 12/3/21 developed new onset chest pain and shortness of breath. Was recommended to go to the ER, did not want to go, attempted to arrange outpatient CT PE which was unable to be done. he was admitted from 12/6/21-12/11/21 for new onset pleuritic chest pain and shortness of breath, found to have organizing pneumonia on CT imaging, improved with antibiotics. He is now off supplemental O2. Decision was made to hold further therapy in the setting of PNA. \par  \par  History: 1/14/22: Pt s/p cycle 5 of R-CHOP and tolerated treatment on 11/22/21. Cycle 6 held due to CAP. \par  Pt has noticed new whole body itching, worse at night X 3 weeks. His wife has been using a new laundry softener for the pat month, no other new soaps/lotions/perfumes/foods. Benadryl helps with the itch. Denies fever/chills, No drenching night sweats. No CP/SOB. No lower extremity edema. Appetite is okay. Pt is gaining weight. No abd pain. No diarrhea/constipation. No hematuria, dysuria. No recent/recurrent infections. Energy levels have improved. Denies numbness/tingling of hands or feet. No mouth sores.\par  \par  4/21/22: Mr. Mercado presents for a follow up visit. s/p cycle 5 of R-CHOP, tolerated treatment on 11/22/21. Cycle 6 held due to CAP. Since he was last seen in Jan 2022, he reports no new symptoms, including appetite changes, lymphadenopathy, fevers, chills, night sweats. He was seen by orthopedics for his right wrist swelling; surgical options were discussed, but decision was made to wait until Mediport was removed before proceeding with surgery. He reports that pain is stable. Seen by his PMD, blood work done to r/o autoimmune arthritis; he was concerned since his REILLY was 1:80. Reviewed blood work with him this visit. He is not currently taking any meds aside from Tylenol for his wrist pain and omeprazole for gastric reflux.\par  \par  7/28/22: Since his last visit he continues to feel well. No new lymphadenopathy, appetite changes, fevers, chills or night sweats. He is going to have surgery on his R hand with Dr. Ashford on August 4. No recent infections. He has noticed that he loses his voice when he talks a lot for the last month but overall it isn't bothersome.\par  \par  10/25/22: Patient presents for follow up. He is overall doing very well. He denies significant complaints except some mild numbness in his hands at night. He had R hand surgery (carpectomy) in August successfully. He is doing hand physical therapy. He denies pain, N/V, diarrhea, constipation, fevers, chills, night sweats, unintentional weight loss or poor appetite, swelling, rash, or new nodules or lumps.  [FreeTextEntry1] : R-CHOP: 8/30/21- 11/22/21 (5 cycles) [de-identified] : 4/27/23: Since last visit in January, he states he has been feeling well overall. No longer having R hand pain since his surgery 8 months ago. Last visit had L foot pain that has resolved. He denies fevers, night sweats, wt loss, lymphadenopathy, N/V/C/D, Ab pain, chest pain, SOB, ED visits, infections, or hospitalizations.  He endorses chronic upper back pain follows with Dr. Syed. ======================================================================= 8/4/2023 Visit conducted via [employer] (Radha, [id number]).  68-year-old Mr. SUTHERLAND is seen for the first time as a follow up for DLBCL in remission. He is originally a Dr. Sierra's patient.  He was diagnosed in 07/2021. The pathology was reportedly GCB type. He was treated with 5 cycles of RCHOP. The 6th cycle was held because patient developed CAP. He completed on 11/22/2021 (C5) and achieved complete remission and continues to be in remission clinically and as of last imaging of 04/2023. Today he endorses no change in his health status and offers no complaints.

## 2023-08-21 ENCOUNTER — APPOINTMENT (OUTPATIENT)
Dept: CARDIOLOGY | Facility: CLINIC | Age: 68
End: 2023-08-21
Payer: MEDICARE

## 2023-08-21 VITALS
SYSTOLIC BLOOD PRESSURE: 146 MMHG | BODY MASS INDEX: 31.69 KG/M2 | OXYGEN SATURATION: 97 % | HEART RATE: 75 BPM | DIASTOLIC BLOOD PRESSURE: 85 MMHG | TEMPERATURE: 98 F | HEIGHT: 74 IN | WEIGHT: 246.91 LBS

## 2023-08-21 VITALS — SYSTOLIC BLOOD PRESSURE: 118 MMHG | DIASTOLIC BLOOD PRESSURE: 80 MMHG

## 2023-08-21 LAB
CHOLEST SERPL-MCNC: 214 MG/DL
HDLC SERPL-MCNC: 34 MG/DL
LDLC SERPL CALC-MCNC: 118 MG/DL
NONHDLC SERPL-MCNC: 181 MG/DL
TRIGL SERPL-MCNC: 359 MG/DL

## 2023-08-21 PROCEDURE — 99214 OFFICE O/P EST MOD 30 MIN: CPT

## 2023-08-21 PROCEDURE — 93010 ELECTROCARDIOGRAM REPORT: CPT | Mod: 59

## 2023-08-21 PROCEDURE — 93000 ELECTROCARDIOGRAM COMPLETE: CPT

## 2023-08-21 NOTE — REASON FOR VISIT
[Spouse] : spouse [[employer] ] : provided by [employer]   [Time Spent: ____ minutes] : Total time spent using  services: [unfilled] minutes. The patient's primary language is not English thus required  services. [FreeTextEntry3] : Dr. Mayen [Interpreters_IDNumber] : [id number] [Interpreters_FullName] : Rolando [TWNoteComboBox1] : [place] [FreeTextEntry1] : BAO SUTHERLAND is a 68 year old man with a history of joint disease (s/p bilateral hip replacement, shoulder, wrist, and knee surgery) and DLBCL in 2021 seen for follow up of pericardial effusion and prior anthracycline chemotherapy.  Prior Cancer Treatments: ------------------------------------------------------------------------ Chemo/targeted therapy: 8/30/21-11/22/21 RCHOP (x5)  ------------------------------------------------------------------------

## 2023-08-21 NOTE — PHYSICAL EXAM
[Normal] : normal conjunctiva [No Carotid Bruit] : no carotid bruit [Normal S1, S2] : normal S1, S2 [No Murmur] : no murmur [No Rub] : no rub [No Gallop] : no gallop [Clear Lung Fields] : clear lung fields [Good Air Entry] : good air entry [No Respiratory Distress] : no respiratory distress  [Normal Gait] : normal gait [Gait - Sufficient for Exercise Testing] : gait - sufficient for exercise testing [No Edema] : no edema [No Cyanosis] : no cyanosis [No Clubbing] : no clubbing [No Varicosities] : no varicosities [No Rash] : no rash [Moves all extremities] : moves all extremities [No Focal Deficits] : no focal deficits [Alert and Oriented] : alert and oriented [Normal Speech] : normal speech

## 2023-08-21 NOTE — HISTORY OF PRESENT ILLNESS
[FreeTextEntry1] : Interval History: Follow up echo with normal EF and trace pericardial effusion, not increased from prior. Exertional dyspnea has improved, though his legs hurt when climbing up stairs. No chest pain. No palpitations. No new medications. We discussed atherosclerotic plaque on CT scans and role in CVD.  History: He developed pleuritic chest pain. cough, and shortness of breath following the 5th cycle of RCHOP in 2021. He was admitted to [facility] and CT Chest showed peripheral lower lobe ground glass opacities. He was treated for pneumonia with antibiotics and symptoms improved.   Follow up CT C/A/P in 2023 showed a new/enlarged pericardial effusion, which was not present in 2022. There was no lymphadenopathy. Echocardiogram showed a small pericardial effusion, anterior to the right ventricle, and normal LV function. Compared to pre-chemotherapy, pericardial effusion is new.  He feels well, but does note occasional difficulty catching his breath, which began about 4 months ago. 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 The shortness of breath occurs both with exertion and when laying flat on his back. He denies edema, chest pain, palpitations, dizziness, pleurisy, cough, fever, or diarrhea/GI upset.  He is not aware of hypertension, diabetes, hypercholesterolemia, and was not a smoker. His father  of a heart problem at age 70. History of bilateral hip replacement, operations on both shoulders, the right wrist, both knees, and cervical spine.  He is a retired  and is now a . Lives in [place] with his wife.  Cardiovascular Summary: ---------------------------------------------- EC2023: NSR 70 bpm, old septal infarct, no change from prior 2023: NSR, possible old septal infarct ---------------------------------------------- Echo: 2023: EF 60 %, trace pericardial effusion, limited imaging windows precluded GLS 2023: EF 62 %, trace pericardial effusion, mildly increased LV wall thickness 2021: EF 60 %, no pericardial effusion ---------------------------------------------- CT: 2023: small pericardial effusion, enlarged sine 10/29/22, new since , atherosclerotic calcifications

## 2023-08-21 NOTE — ASSESSMENT
[FreeTextEntry1] :  68 year old man with new small pericardial effusion on CT and echo followed anthracycline chemotherapy (R-CHOP) for B cell lymphoma with clinical remission and no evidence of disease on PET. Stable on follow up echo and asymptomatic. Given history of anthracycline chemotherapy (5 cycles R CHOP), at risk for late cardiomyopathy from anthracycline  We discussed the increased risk for cardiovascular disease in cancer survivors and role of cholesterol in atherosclerotic plaque progression and heart disease.  1. Risk for cardiomyopathy - echo images not suitable for strain imaging, but normal troponin and pro-BNP 2. Trace/small pericardial effusion, stable on follow up echo 3. atherosclerotic calcifications on chest CT - will check lipid panel. Normal CRP and A1c. 4. Shortness of breath - normal BNP and LV function, improved overall.  Will check lipid panel today and initiate statin therapy based on these results. He agrees with the plan.  Follow up in 6 months with me.  Above discussed with the patient and his wife and all questions answered to the best of my ability and to their apparent satisfaction.

## 2023-09-14 ENCOUNTER — APPOINTMENT (OUTPATIENT)
Dept: DERMATOLOGY | Facility: CLINIC | Age: 68
End: 2023-09-14
Payer: MEDICARE

## 2023-09-14 PROCEDURE — 99204 OFFICE O/P NEW MOD 45 MIN: CPT

## 2023-09-30 ENCOUNTER — RESULT REVIEW (OUTPATIENT)
Age: 68
End: 2023-09-30

## 2023-10-31 NOTE — ED PROVIDER NOTE - GASTROINTESTINAL, MLM
Mono R Caleb Villareal Mono R Abdomen soft, non-tender, no guarding. Mono R Caleb Villareal Woody Woody Woody

## 2023-11-02 ENCOUNTER — APPOINTMENT (OUTPATIENT)
Dept: GASTROENTEROLOGY | Facility: CLINIC | Age: 68
End: 2023-11-02

## 2023-11-08 ENCOUNTER — OUTPATIENT (OUTPATIENT)
Dept: OUTPATIENT SERVICES | Facility: HOSPITAL | Age: 68
LOS: 1 days | Discharge: HOME | End: 2023-11-08
Payer: MEDICARE

## 2023-11-08 DIAGNOSIS — Z98.890 OTHER SPECIFIED POSTPROCEDURAL STATES: Chronic | ICD-10-CM

## 2023-11-08 DIAGNOSIS — C83.10 MANTLE CELL LYMPHOMA, UNSPECIFIED SITE: ICD-10-CM

## 2023-11-10 ENCOUNTER — APPOINTMENT (OUTPATIENT)
Dept: INFUSION THERAPY | Facility: HOSPITAL | Age: 68
End: 2023-11-10

## 2023-11-13 ENCOUNTER — APPOINTMENT (OUTPATIENT)
Dept: CT IMAGING | Facility: IMAGING CENTER | Age: 68
End: 2023-11-13
Payer: MEDICARE

## 2023-11-13 ENCOUNTER — OUTPATIENT (OUTPATIENT)
Dept: OUTPATIENT SERVICES | Facility: HOSPITAL | Age: 68
LOS: 1 days | End: 2023-11-13
Payer: COMMERCIAL

## 2023-11-13 DIAGNOSIS — C85.10 UNSPECIFIED B-CELL LYMPHOMA, UNSPECIFIED SITE: ICD-10-CM

## 2023-11-13 DIAGNOSIS — Z98.890 OTHER SPECIFIED POSTPROCEDURAL STATES: Chronic | ICD-10-CM

## 2023-11-13 PROCEDURE — 70491 CT SOFT TISSUE NECK W/DYE: CPT | Mod: 26

## 2023-11-13 PROCEDURE — 71260 CT THORAX DX C+: CPT

## 2023-11-13 PROCEDURE — 74177 CT ABD & PELVIS W/CONTRAST: CPT

## 2023-11-13 PROCEDURE — 74177 CT ABD & PELVIS W/CONTRAST: CPT | Mod: 26

## 2023-11-13 PROCEDURE — 70491 CT SOFT TISSUE NECK W/DYE: CPT

## 2023-11-13 PROCEDURE — 71260 CT THORAX DX C+: CPT | Mod: 26

## 2023-11-24 ENCOUNTER — RESULT REVIEW (OUTPATIENT)
Age: 68
End: 2023-11-24

## 2023-11-24 ENCOUNTER — APPOINTMENT (OUTPATIENT)
Dept: HEMATOLOGY ONCOLOGY | Facility: CLINIC | Age: 68
End: 2023-11-24
Payer: MEDICARE

## 2023-11-24 VITALS
HEART RATE: 63 BPM | DIASTOLIC BLOOD PRESSURE: 81 MMHG | OXYGEN SATURATION: 96 % | TEMPERATURE: 97.5 F | RESPIRATION RATE: 15 BRPM | BODY MASS INDEX: 31.42 KG/M2 | WEIGHT: 244.71 LBS | SYSTOLIC BLOOD PRESSURE: 138 MMHG

## 2023-11-24 LAB
BASOPHILS # BLD AUTO: 0.05 K/UL — SIGNIFICANT CHANGE UP (ref 0–0.2)
BASOPHILS # BLD AUTO: 0.05 K/UL — SIGNIFICANT CHANGE UP (ref 0–0.2)
BASOPHILS NFR BLD AUTO: 0.7 % — SIGNIFICANT CHANGE UP (ref 0–2)
BASOPHILS NFR BLD AUTO: 0.7 % — SIGNIFICANT CHANGE UP (ref 0–2)
EOSINOPHIL # BLD AUTO: 0.08 K/UL — SIGNIFICANT CHANGE UP (ref 0–0.5)
EOSINOPHIL # BLD AUTO: 0.08 K/UL — SIGNIFICANT CHANGE UP (ref 0–0.5)
EOSINOPHIL NFR BLD AUTO: 1.1 % — SIGNIFICANT CHANGE UP (ref 0–6)
EOSINOPHIL NFR BLD AUTO: 1.1 % — SIGNIFICANT CHANGE UP (ref 0–6)
HCT VFR BLD CALC: 42.2 % — SIGNIFICANT CHANGE UP (ref 39–50)
HCT VFR BLD CALC: 42.2 % — SIGNIFICANT CHANGE UP (ref 39–50)
HGB BLD-MCNC: 14.6 G/DL — SIGNIFICANT CHANGE UP (ref 13–17)
HGB BLD-MCNC: 14.6 G/DL — SIGNIFICANT CHANGE UP (ref 13–17)
IMM GRANULOCYTES NFR BLD AUTO: 0.3 % — SIGNIFICANT CHANGE UP (ref 0–0.9)
IMM GRANULOCYTES NFR BLD AUTO: 0.3 % — SIGNIFICANT CHANGE UP (ref 0–0.9)
LYMPHOCYTES # BLD AUTO: 1.21 K/UL — SIGNIFICANT CHANGE UP (ref 1–3.3)
LYMPHOCYTES # BLD AUTO: 1.21 K/UL — SIGNIFICANT CHANGE UP (ref 1–3.3)
LYMPHOCYTES # BLD AUTO: 16.1 % — SIGNIFICANT CHANGE UP (ref 13–44)
LYMPHOCYTES # BLD AUTO: 16.1 % — SIGNIFICANT CHANGE UP (ref 13–44)
MCHC RBC-ENTMCNC: 29.8 PG — SIGNIFICANT CHANGE UP (ref 27–34)
MCHC RBC-ENTMCNC: 29.8 PG — SIGNIFICANT CHANGE UP (ref 27–34)
MCHC RBC-ENTMCNC: 34.6 G/DL — SIGNIFICANT CHANGE UP (ref 32–36)
MCHC RBC-ENTMCNC: 34.6 G/DL — SIGNIFICANT CHANGE UP (ref 32–36)
MCV RBC AUTO: 86.1 FL — SIGNIFICANT CHANGE UP (ref 80–100)
MCV RBC AUTO: 86.1 FL — SIGNIFICANT CHANGE UP (ref 80–100)
MONOCYTES # BLD AUTO: 0.57 K/UL — SIGNIFICANT CHANGE UP (ref 0–0.9)
MONOCYTES # BLD AUTO: 0.57 K/UL — SIGNIFICANT CHANGE UP (ref 0–0.9)
MONOCYTES NFR BLD AUTO: 7.6 % — SIGNIFICANT CHANGE UP (ref 2–14)
MONOCYTES NFR BLD AUTO: 7.6 % — SIGNIFICANT CHANGE UP (ref 2–14)
NEUTROPHILS # BLD AUTO: 5.57 K/UL — SIGNIFICANT CHANGE UP (ref 1.8–7.4)
NEUTROPHILS # BLD AUTO: 5.57 K/UL — SIGNIFICANT CHANGE UP (ref 1.8–7.4)
NEUTROPHILS NFR BLD AUTO: 74.2 % — SIGNIFICANT CHANGE UP (ref 43–77)
NEUTROPHILS NFR BLD AUTO: 74.2 % — SIGNIFICANT CHANGE UP (ref 43–77)
NRBC # BLD: 0 /100 WBCS — SIGNIFICANT CHANGE UP (ref 0–0)
NRBC # BLD: 0 /100 WBCS — SIGNIFICANT CHANGE UP (ref 0–0)
PLATELET # BLD AUTO: 236 K/UL — SIGNIFICANT CHANGE UP (ref 150–400)
PLATELET # BLD AUTO: 236 K/UL — SIGNIFICANT CHANGE UP (ref 150–400)
RBC # BLD: 4.9 M/UL — SIGNIFICANT CHANGE UP (ref 4.2–5.8)
RBC # BLD: 4.9 M/UL — SIGNIFICANT CHANGE UP (ref 4.2–5.8)
RBC # FLD: 13.2 % — SIGNIFICANT CHANGE UP (ref 10.3–14.5)
RBC # FLD: 13.2 % — SIGNIFICANT CHANGE UP (ref 10.3–14.5)
WBC # BLD: 7.5 K/UL — SIGNIFICANT CHANGE UP (ref 3.8–10.5)
WBC # BLD: 7.5 K/UL — SIGNIFICANT CHANGE UP (ref 3.8–10.5)
WBC # FLD AUTO: 7.5 K/UL — SIGNIFICANT CHANGE UP (ref 3.8–10.5)
WBC # FLD AUTO: 7.5 K/UL — SIGNIFICANT CHANGE UP (ref 3.8–10.5)

## 2023-11-24 PROCEDURE — 99214 OFFICE O/P EST MOD 30 MIN: CPT

## 2023-11-25 LAB
ALBUMIN SERPL ELPH-MCNC: 4.3 G/DL
ALP BLD-CCNC: 89 U/L
ALT SERPL-CCNC: 17 U/L
ANION GAP SERPL CALC-SCNC: 13 MMOL/L
AST SERPL-CCNC: 15 U/L
BILIRUB SERPL-MCNC: 0.2 MG/DL
BUN SERPL-MCNC: 16 MG/DL
CALCIUM SERPL-MCNC: 9.2 MG/DL
CHLORIDE SERPL-SCNC: 104 MMOL/L
CO2 SERPL-SCNC: 23 MMOL/L
CREAT SERPL-MCNC: 1.26 MG/DL
EGFR: 62 ML/MIN/1.73M2
GLUCOSE SERPL-MCNC: 103 MG/DL
LDH SERPL-CCNC: 170 U/L
POTASSIUM SERPL-SCNC: 4.3 MMOL/L
PROT SERPL-MCNC: 6.9 G/DL
SODIUM SERPL-SCNC: 140 MMOL/L

## 2023-11-28 ENCOUNTER — APPOINTMENT (OUTPATIENT)
Dept: HEMATOLOGY ONCOLOGY | Facility: CLINIC | Age: 68
End: 2023-11-28

## 2023-11-30 LAB
APTT BLD: 30.9 SEC
INR PPP: 0.95 RATIO
PT BLD: 10.9 SEC

## 2023-12-01 ENCOUNTER — TRANSCRIPTION ENCOUNTER (OUTPATIENT)
Age: 68
End: 2023-12-01

## 2023-12-01 ENCOUNTER — OUTPATIENT (OUTPATIENT)
Dept: OUTPATIENT SERVICES | Facility: HOSPITAL | Age: 68
LOS: 1 days | End: 2023-12-01
Payer: COMMERCIAL

## 2023-12-01 ENCOUNTER — RESULT REVIEW (OUTPATIENT)
Age: 68
End: 2023-12-01

## 2023-12-01 VITALS
SYSTOLIC BLOOD PRESSURE: 158 MMHG | OXYGEN SATURATION: 98 % | HEIGHT: 72 IN | WEIGHT: 240.08 LBS | HEART RATE: 68 BPM | RESPIRATION RATE: 16 BRPM | DIASTOLIC BLOOD PRESSURE: 83 MMHG | TEMPERATURE: 97 F

## 2023-12-01 VITALS
SYSTOLIC BLOOD PRESSURE: 117 MMHG | DIASTOLIC BLOOD PRESSURE: 74 MMHG | RESPIRATION RATE: 16 BRPM | OXYGEN SATURATION: 98 % | HEART RATE: 66 BPM

## 2023-12-01 DIAGNOSIS — Z98.890 OTHER SPECIFIED POSTPROCEDURAL STATES: Chronic | ICD-10-CM

## 2023-12-01 DIAGNOSIS — C85.10 UNSPECIFIED B-CELL LYMPHOMA, UNSPECIFIED SITE: ICD-10-CM

## 2023-12-01 PROCEDURE — 36590 REMOVAL TUNNELED CV CATH: CPT

## 2023-12-01 RX ORDER — HYDROMORPHONE HYDROCHLORIDE 2 MG/ML
0.5 INJECTION INTRAMUSCULAR; INTRAVENOUS; SUBCUTANEOUS
Refills: 0 | Status: DISCONTINUED | OUTPATIENT
Start: 2023-12-01 | End: 2023-12-01

## 2023-12-01 RX ORDER — NABUMETONE 750 MG
1 TABLET ORAL
Qty: 0 | Refills: 0 | DISCHARGE

## 2023-12-01 NOTE — H&P ADULT - HISTORY OF PRESENT ILLNESS
Vascular & Interventional Radiology Pre-Procedure Note    Procedure Name: port removal    HPI: 68y Male with h/o lymphoma with right port now completed therapy presents for port removal.     Allergies: gadolinium containing compounds (Faint; Rash)      Medications: Home Medications:  acetaminophen 325 mg oral tablet: 2 tab(s) orally every 6 hours, As needed, Temp greater or equal to 38C (100.4F), Mild Pain (1 - 3) (01 Dec 2023 07:21)  nabumetone 750 mg oral tablet: 1 tab(s) orally 2 times a day, As Needed (01 Dec 2023 07:21)      Data:  Vital Signs Last 24 Hrs  T(C): 36.2 (01 Dec 2023 07:23), Max: 36.2 (01 Dec 2023 07:23)  T(F): 97.2 (01 Dec 2023 07:23), Max: 97.2 (01 Dec 2023 07:23)  HR: 68 (01 Dec 2023 07:23) (68 - 68)  BP: 158/83 (01 Dec 2023 07:23) (158/83 - 158/83)  BP(mean): --  RR: 16 (01 Dec 2023 07:23) (16 - 16)  SpO2: 98% (01 Dec 2023 07:23) (98% - 98%)          Plan:   -68y Male presents for port removal  -Risks/Benefits/alternatives explained with the patient and witnessed informed consent obtained.

## 2023-12-01 NOTE — ASU PATIENT PROFILE, ADULT - NS TRANSFER PATIENT BELONGINGS
Chief Complaint   Patient presents with     Derm Problem     Patient is here today for mohs.      Sonia RACHEL CMA    
Clothing

## 2023-12-01 NOTE — ASU DISCHARGE PLAN (ADULT/PEDIATRIC) - ASU DC SPECIAL INSTRUCTIONSFT
Chest Port Removal    Discharge Instructions  - You have had a chest port removed from your chest.   - You may shower in 48 hours. No soaking or swimming for 2 weeks or until the site is completely healed.  - Do not perform any heavy lifting or put tension on the area for the next week or until the site is healed.  - The skin glue (Dermabond) on your skin will act as a scab, allow it to fall off on its own over the next 1-3 weeks.  - You may resume your normal diet.  - You may resume your normal medications however you should wait 48 hours before restarting aspirin, plavix, or blood thinners.  - It is normal to experience some pain over the site for the next few days. You may take apply ice to the area (20 minutes on, 20 minutes off) and take Tylenol for that pain. Do not take more frequently than every 6 hours and do not exceed more than 3000mg of Tylenol in a 24 hour period.    - You were given conscious sedation which may make you drowsy, therefore you need someone to stay with you until the morning following the procedure.  - Do not drive, engage in heavy lifting or strenuous activity, or drink any alcoholic beverages for the next 24 hours.   - You may resume normal activity in 24 hours.    Notify your primary physician and/or Interventional Radiology IMMEDIATELY if you experience any of the following       - Fever of 101F or 38C       - Chills or Rigors/ Shakes       - Swelling and/or Redness in the area around the port removal site       - Worsening Pain       - Blood soaked bandages or worsening bleeding       - Lightheadedness and/or dizziness upon standing       - Chest Pain/ Tightness       - Shortness of Breath       - Difficulty walking    If you have a problem that you believe requires IMMEDIATE attention, please go to your NEAREST Emergency Room. If you believe your problem can safely wait until you speak to a physician, please call Interventional Radiology for any concerns.    During Normal Weekday Business Hours- You can contact the Interventional Radiology department during normal business hours via telephone.  During Evenings and Weekends- If you need to contact Interventional Radiology during off hours, do so by calling the hospital and requesting to be connected to the Interventional Radiologist on call. Chest Port Removal    Discharge Instructions  - You have had a chest port removed from your chest.   - You may shower in 48 hours. No soaking or swimming for 2 weeks or until the site is completely healed.  - Do not perform any heavy lifting or put tension on the area for the next week or until the site is healed.  - The skin glue (Dermabond) on your skin will act as a scab, allow it to fall off on its own over the next 1-3 weeks.  - You may resume your normal diet.  - You may resume your normal medications however you should wait 48 hours before restarting aspirin, plavix, or blood thinners.  - It is normal to experience some pain over the site for the next few days. You may take apply ice to the area (20 minutes on, 20 minutes off) and take Tylenol for that pain. Do not take more frequently than every 6 hours and do not exceed more than 3000mg of Tylenol in a 24 hour period.    - You were given conscious sedation which may make you drowsy, therefore you need someone to stay with you until the morning following the procedure.  - Do not drive, engage in heavy lifting or strenuous activity, or drink any alcoholic beverages for the next 24 hours.   - You may resume normal activity in 24 hours.    Notify your primary physician and/or Interventional Radiology IMMEDIATELY if you experience any of the following       - Fever of 101F or 38C       - Chills or Rigors/ Shakes       - Swelling and/or Redness in the area around the port removal site       - Worsening Pain       - Blood soaked bandages or worsening bleeding       - Lightheadedness and/or dizziness upon standing       - Chest Pain/ Tightness       - Shortness of Breath       - Difficulty walking    If you have a problem that you believe requires IMMEDIATE attention, please go to your NEAREST Emergency Room. If you believe your problem can safely wait until you speak to a physician, please call Interventional Radiology for any concerns.    During Normal Weekday Business Hours- You can contact the Interventional Radiology department during normal business hours via telephone.  During Evenings and Weekends- If you need to contact Interventional Radiology during off hours, do so by calling the hospital and requesting to be connected to the Interventional Radiologist on call.  Please feel free to contact us at [phone] if any problems arise. After 6PM, Monday through Friday, on weekends and on holidays, please call [phone] and ask for the radiology resident on call to be paged. Chest Port Removal    Discharge Instructions  - You have had a chest port removed from your chest.   - You may shower in 48 hours. No soaking or swimming for 2 weeks or until the site is completely healed.  - Do not perform any heavy lifting or put tension on the area for the next week or until the site is healed.  - The skin glue (Dermabond) on your skin will act as a scab, allow it to fall off on its own over the next 1-3 weeks.  - You may resume your normal diet.  - You may resume your normal medications however you should wait 48 hours before restarting aspirin, plavix, or blood thinners.  - It is normal to experience some pain over the site for the next few days. You may take apply ice to the area (20 minutes on, 20 minutes off) and take Tylenol for that pain. Do not take more frequently than every 6 hours and do not exceed more than 3000mg of Tylenol in a 24 hour period.    - You were given conscious sedation which may make you drowsy, therefore you need someone to stay with you until the morning following the procedure.  - Do not drive, engage in heavy lifting or strenuous activity, or drink any alcoholic beverages for the next 24 hours.   - You may resume normal activity in 24 hours.    Notify your primary physician and/or Interventional Radiology IMMEDIATELY if you experience any of the following       - Fever of 101F or 38C       - Chills or Rigors/ Shakes       - Swelling and/or Redness in the area around the port removal site       - Worsening Pain       - Blood soaked bandages or worsening bleeding       - Lightheadedness and/or dizziness upon standing       - Chest Pain/ Tightness       - Shortness of Breath       - Difficulty walking    If you have a problem that you believe requires IMMEDIATE attention, please go to your NEAREST Emergency Room. If you believe your problem can safely wait until you speak to a physician, please call Interventional Radiology for any concerns.    During Normal Weekday Business Hours- You can contact the Interventional Radiology department during normal business hours via telephone.  During Evenings and Weekends- If you need to contact Interventional Radiology during off hours, do so by calling the hospital and requesting to be connected to the Interventional Radiologist on call.  Please feel free to contact us at [phone] if any problems arise. After 6PM, Monday through Friday, on weekends and on holidays, please call [phone] and ask for the radiology resident on call to be paged. Chest Port Removal    Discharge Instructions  - You have had a chest port removed from your chest.   - You may shower in 48 hours. No soaking or swimming for 2 weeks or until the site is completely healed.  - Do not perform any heavy lifting or put tension on the area for the next week or until the site is healed.  - The skin glue (Dermabond) on your skin will act as a scab, allow it to fall off on its own over the next 1-3 weeks.  - You may resume your normal diet.  - You may resume your normal medications however you should wait 48 hours before restarting aspirin, plavix, or blood thinners.  - It is normal to experience some pain over the site for the next few days. You may take apply ice to the area (20 minutes on, 20 minutes off) and take Tylenol for that pain. Do not take more frequently than every 6 hours and do not exceed more than 3000mg of Tylenol in a 24 hour period.    - You were given conscious sedation which may make you drowsy, therefore you need someone to stay with you until the morning following the procedure.  - Do not drive, engage in heavy lifting or strenuous activity, or drink any alcoholic beverages for the next 24 hours.   - You may resume normal activity in 24 hours.    Notify your primary physician and/or Interventional Radiology IMMEDIATELY if you experience any of the following       - Fever of 101F or 38C       - Chills or Rigors/ Shakes       - Swelling and/or Redness in the area around the port removal site       - Worsening Pain       - Blood soaked bandages or worsening bleeding       - Lightheadedness and/or dizziness upon standing       - Chest Pain/ Tightness       - Shortness of Breath       - Difficulty walking    If you have a problem that you believe requires IMMEDIATE attention, please go to your NEAREST Emergency Room. If you believe your problem can safely wait until you speak to a physician, please call Interventional Radiology for any concerns.    During Normal Weekday Business Hours- You can contact the Interventional Radiology department during normal business hours via telephone.  During Evenings and Weekends- If you need to contact Interventional Radiology during off hours, do so by calling the hospital and requesting to be connected to the Interventional Radiologist on call.  Please feel free to contact us at [phone] if any problems arise. After 6PM, Monday through Friday, on weekends and on holidays, please call [phone] and ask for the radiology resident on call to be paged.

## 2023-12-01 NOTE — PROCEDURE NOTE - PROCEDURE FINDINGS AND DETAILS
technically successful right chest port removal. all 3 parts of port accounted for. skin closed with absorbable sutures and covered with dermabond.

## 2023-12-01 NOTE — ASU DISCHARGE PLAN (ADULT/PEDIATRIC) - NS MD DC FALL RISK RISK
For information on Fall & Injury Prevention, visit: [email or web address]/[email or web address]-[email or web address]-mobility OR  [email or web address].[email or web address]/[email or web address]-[email or web address]-tips-[email or web address]-avoid-injury OR  https://www.cdc.gov/steadi/patient.html For information on Fall & Injury Prevention, visit: https://www.[facility]/news/fall-prevention-protects-and-maintains-health-and-mobility OR  https://www.[facility]/news/fall-prevention-tips-to-avoid-injury OR  https://www.cdc.gov/steadi/patient.html For information on Fall & Injury Prevention, visit: https://www.[facility]/news/fall-prevention-protects-and-maintains-health-and-mobility OR  https://www.[facility]/news/fall-prevention-tips-to-avoid-injury OR  https://www.cdc.gov/steadi/patient.html

## 2023-12-11 ENCOUNTER — APPOINTMENT (OUTPATIENT)
Dept: DERMATOLOGY | Facility: CLINIC | Age: 68
End: 2023-12-11
Payer: MEDICARE

## 2023-12-11 DIAGNOSIS — L50.3 DERMATOGRAPHIC URTICARIA: ICD-10-CM

## 2023-12-11 PROCEDURE — 99214 OFFICE O/P EST MOD 30 MIN: CPT

## 2023-12-11 RX ORDER — FEXOFENADINE HYDROCHLORIDE 180 MG/1
180 TABLET ORAL
Qty: 30 | Refills: 5 | Status: ACTIVE | COMMUNITY
Start: 2023-12-11 | End: 1900-01-01

## 2023-12-11 RX ORDER — MOMETASONE FUROATE 1 MG/ML
0.1 SOLUTION TOPICAL
Qty: 1 | Refills: 5 | Status: ACTIVE | COMMUNITY
Start: 2023-09-14 | End: 1900-01-01

## 2023-12-12 DIAGNOSIS — Z45.2 ENCOUNTER FOR ADJUSTMENT AND MANAGEMENT OF VASCULAR ACCESS DEVICE: ICD-10-CM

## 2024-01-19 NOTE — REVIEW OF SYSTEMS
Vivi Ayers(Resident) Imaging Studies [Patient Intake Form Reviewed] : Patient intake form was reviewed [Joint Pain] : joint pain [Muscle Pain] : muscle pain [Swollen Glands] : swollen glands [Negative] : Allergic/Immunologic [FreeTextEntry7] : occasional abdominal discomfort [de-identified] : occasional numbness in arms; has b/l hand tingling [de-identified] : cervical enlarged lymph nodes has signifcantly decreased in size since C1 of chemo

## 2024-02-12 ENCOUNTER — APPOINTMENT (OUTPATIENT)
Dept: DERMATOLOGY | Facility: CLINIC | Age: 69
End: 2024-02-12
Payer: MEDICARE

## 2024-02-12 PROCEDURE — 99214 OFFICE O/P EST MOD 30 MIN: CPT

## 2024-02-12 RX ORDER — TRIAMCINOLONE ACETONIDE 1 MG/G
0.1 OINTMENT TOPICAL
Qty: 1 | Refills: 1 | Status: ACTIVE | COMMUNITY
Start: 2023-09-14 | End: 1900-01-01

## 2024-02-13 NOTE — ASSESSMENT
[FreeTextEntry1] : #Eczematous dermatitis, chronic, flaring #dermatographism mildly positive -start claritin 10 mg -cont sarna lotion PRN itch -START triamcinolone 0.1% ointment BID PRN for body, not face or groin, med side effects discussed such as skin thinning, telangiectasias, hypopigmentation; instructions discussed -gentle skin care, liberal emollients reviewed, recommended Cerave and Cetaphil  #Seborrheic dermatitis, scalp, chronic, improving -I discussed the chronic nature and course of this condition -continue Ketoconazole 2% shampoo 2-3 times a week to the scalp. Leave on for at least 5 mins before rinsing out. Potential SE reviewed including dryness, irritation. Alternate with head and shoulders or DHS zinc shampoo. If not covered, can use otc Nizoral shampoo. -continue mometasone solution daily to AA until improved then as needed, SED, do not get on face. -Proper medication use and side effects discussed, including cutaneous atrophy, telangiectasias, and striae. Avoid long-term use.  RTC in 2-3 months.

## 2024-02-13 NOTE — HISTORY OF PRESENT ILLNESS
[FreeTextEntry1] : follow up [de-identified] : 67yo M presents for follow up.  ID [id number] here with his wife last seen 12/2023 for dermatographism tried allegra 180 mg daily for 2 days but it made him very drowsy so he stopped nothing makes it worse or better, he is consistently itchy; is scratching in room worst itchy areas is L shoulder, R wrist, and lower back feels like as he scratches it swells up more uses Dove soap no moisturizer except for "something we told him to buy" (likely SARNA per note) no one else itchy at home #seb derm - using ketoconazole shampoo, mometasone soln with good improvement #rash on right lower leg - resolved s/p triam cream  takes nabumetone occasionally for back pain, takes 1-2 times per week as needed; takes tylenol PRN for back pain no new meds

## 2024-02-13 NOTE — PHYSICAL EXAM
[Alert] : alert [Oriented x 3] : ~L oriented x 3 [Declined] : declined [FreeTextEntry3] : Focused exam: - positive dermatographism - scalp and right lower leg clear without rash - trunk with pink scaly papules, especially on L shoulder

## 2024-03-08 ENCOUNTER — APPOINTMENT (OUTPATIENT)
Dept: CARDIOLOGY | Facility: CLINIC | Age: 69
End: 2024-03-08
Payer: MEDICARE

## 2024-03-08 VITALS
DIASTOLIC BLOOD PRESSURE: 88 MMHG | OXYGEN SATURATION: 98 % | SYSTOLIC BLOOD PRESSURE: 139 MMHG | BODY MASS INDEX: 32.45 KG/M2 | TEMPERATURE: 97.2 F | HEIGHT: 74 IN | HEART RATE: 65 BPM | WEIGHT: 252.87 LBS

## 2024-03-08 DIAGNOSIS — Z01.818 ENCOUNTER FOR OTHER PREPROCEDURAL EXAMINATION: ICD-10-CM

## 2024-03-08 PROCEDURE — 93000 ELECTROCARDIOGRAM COMPLETE: CPT

## 2024-03-08 PROCEDURE — G2211 COMPLEX E/M VISIT ADD ON: CPT

## 2024-03-08 PROCEDURE — 99215 OFFICE O/P EST HI 40 MIN: CPT

## 2024-03-08 PROCEDURE — 93010 ELECTROCARDIOGRAM REPORT: CPT

## 2024-03-08 RX ORDER — ROSUVASTATIN CALCIUM 5 MG/1
5 TABLET, FILM COATED ORAL
Qty: 90 | Refills: 3 | Status: ACTIVE | COMMUNITY
Start: 2023-08-22 | End: 1900-01-01

## 2024-03-08 NOTE — REVIEW OF SYSTEMS
[Feeling Fatigued] : feeling fatigued [Dyspnea on exertion] : dyspnea during exertion [Chest Discomfort] : chest discomfort [Myalgia] : myalgia [Orthopnea] : no orthopnea [PND] : no PND

## 2024-03-08 NOTE — REASON FOR VISIT
[[employer] ] : provided by [employer]   [Time Spent: ____ minutes] : Total time spent using  services: [unfilled] minutes. The patient's primary language is not English thus required  services. [FreeTextEntry3] : Dr. Mayen [Interpreters_IDNumber] : [id number] [Interpreters_FullName] : L[place] [FreeTextEntry1] : --------------------------------------------------------------------- BAO SUTHERLAND is a 68 year old man with a history of joint disease (s/p bilateral hip replacement, shoulder, wrist, and knee surgery) and DLBCL in 2021 seen for follow up of pericardial effusion and prior anthracycline chemotherapy.  Prior Cancer Treatments: ------------------------------------------------------------------------ Chemo/targeted therapy: 8/30/21-11/22/21 RCHOP (x5)  ------------------------------------------------------------------------ [TWNoteComboBox1] : [place]

## 2024-03-08 NOTE — HISTORY OF PRESENT ILLNESS
[FreeTextEntry1] : Interval History: He complains of shortness of breath and chest discomfort with exertion, such as walking up stairs. After 12-13 steps, has to stop and catch his breath. He reports symptoms for the past month. He reports not taking rosuvastatin for the past few weeks due to body pain. Body pain not different since stopping. He also reports excessive daytime sleepiness, despite sleeping 8 hours or more each night. His wife reports he does snore loudly. No witnessed apneic episodes. He reports he was diagnosed with sleep apnea previously, but never followed up for treatment. No recent sleep study.  Lymphoma in remission.   History: He developed pleuritic chest pain. cough, and shortness of breath following the 5th cycle of RCHOP in 2021. He was admitted to [facility] and CT Chest showed peripheral lower lobe ground glass opacities. He was treated for pneumonia with antibiotics and symptoms improved.   Follow up CT C/A/P in 2023 showed a new/enlarged pericardial effusion, which was not present in 2022. There was no lymphadenopathy. Echocardiogram showed a small pericardial effusion, anterior to the right ventricle, and normal LV function. Compared to pre-chemotherapy, pericardial effusion is new.  He feels well, but does note occasional difficulty catching his breath, which began about 4 months ago. 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 The shortness of breath occurs both with exertion and when laying flat on his back. He denies edema, chest pain, palpitations, dizziness, pleurisy, cough, fever, or diarrhea/GI upset.  He is not aware of hypertension, diabetes, hypercholesterolemia, and was not a smoker. His father  of a heart problem at age 70. History of bilateral hip replacement, operations on both shoulders, the right wrist, both knees, and cervical spine.  He is a retired  and is now a . Lives in [place] with his wife.  2023: Follow up echo with normal EF and trace pericardial effusion, not increased from prior. Exertional dyspnea has improved, though his legs hurt when climbing up stairs. No chest pain. No palpitations. No new medications. We discussed atherosclerotic plaque on CT scans and role in CVD.   Cardiovascular Summary: ---------------------------------------------- ECG: 3/8/2024: NSR, old septal infarct (unchanged from prior) 2023: NSR 70 bpm, old septal infarct, no change from prior 2023: NSR, possible old septal infarct ---------------------------------------------- Echo: 2023: EF 60 %, trace pericardial effusion, limited imaging windows precluded GLS 2023: EF 62 %, trace pericardial effusion, mildly increased LV wall thickness 2021: EF 60 %, no pericardial effusion ---------------------------------------------- CT: 2023: small pericardial effusion, enlarged sine 10/29/22, new since , atherosclerotic calcifications

## 2024-03-08 NOTE — ASSESSMENT
[FreeTextEntry1] : ----------------------------------------------- 68 year old man with new small pericardial effusion on CT and echo followed anthracycline chemotherapy (R-CHOP) for B cell lymphoma, atherosclerotic plaque, elevated BP and LDL with symptoms of exertional dyspnea and chest pain which may be angina. Patient reports new, though had evaluation for shortness of breath previously with normal BNP and LV function. However, exertional symptoms new.  1. Risk for cardiomyopathy - echo images not suitable for strain imaging, but normal troponin and pro-BNP 2. Trace/small pericardial effusion, stable on follow up echo 3. atherosclerotic calcifications on chest CT - , HDL 34, non-, Normal CRP and A1c. 4. Exertional dyspnea and chest discomfort.  5. DLBCL in remission s/p RCHOP  Patient is not candidate for treadmill exercise due to orthopedic issues and joint pain. Also, notation is made of a contrast allergy. For this reason, a nuclear stress test will be obtained for exertional dyspnea and risk stratification of CAD.  Recommendations: 1. nuclear stress test 2. repeat echo now 3. resume rosuvastatin 5 mg daily 4. start aspirin 81 mg daily pending above results 5. home sleep study or Pulmonary/sleep medicine referral for OLEGARIO (STOP BANG score intermediate risk for OLEGARIO) 6. schedule a follow up appointment with me in 3 months  Above discussed with the patient and his wife and all questions answered to the best of my ability and to their apparent satisfaction.

## 2024-03-19 RX ORDER — CAMPHOR 0.45 %
25 GEL (GRAM) TOPICAL
Qty: 30 | Refills: 0 | Status: ACTIVE | COMMUNITY
Start: 2022-01-13 | End: 1900-01-01

## 2024-03-19 RX ORDER — PREDNISONE 50 MG/1
50 TABLET ORAL
Qty: 30 | Refills: 0 | Status: ACTIVE | COMMUNITY
Start: 2022-01-13 | End: 1900-01-01

## 2024-03-28 ENCOUNTER — APPOINTMENT (OUTPATIENT)
Dept: CARDIOLOGY | Facility: CLINIC | Age: 69
End: 2024-03-28
Payer: MEDICARE

## 2024-03-28 PROCEDURE — 93306 TTE W/DOPPLER COMPLETE: CPT

## 2024-03-28 PROCEDURE — 78452 HT MUSCLE IMAGE SPECT MULT: CPT

## 2024-03-28 PROCEDURE — A9500: CPT

## 2024-03-28 PROCEDURE — 93015 CV STRESS TEST SUPVJ I&R: CPT

## 2024-04-24 ENCOUNTER — OUTPATIENT (OUTPATIENT)
Dept: OUTPATIENT SERVICES | Facility: HOSPITAL | Age: 69
LOS: 1 days | Discharge: HOME | End: 2024-04-24
Payer: MEDICARE

## 2024-04-24 DIAGNOSIS — Z98.890 OTHER SPECIFIED POSTPROCEDURAL STATES: Chronic | ICD-10-CM

## 2024-04-24 DIAGNOSIS — C83.10 MANTLE CELL LYMPHOMA, UNSPECIFIED SITE: ICD-10-CM

## 2024-05-02 ENCOUNTER — NON-APPOINTMENT (OUTPATIENT)
Age: 69
End: 2024-05-02

## 2024-05-03 ENCOUNTER — RESULT REVIEW (OUTPATIENT)
Age: 69
End: 2024-05-03

## 2024-05-03 ENCOUNTER — APPOINTMENT (OUTPATIENT)
Dept: HEMATOLOGY ONCOLOGY | Facility: CLINIC | Age: 69
End: 2024-05-03

## 2024-05-03 ENCOUNTER — APPOINTMENT (OUTPATIENT)
Dept: HEMATOLOGY ONCOLOGY | Facility: CLINIC | Age: 69
End: 2024-05-03
Payer: MEDICARE

## 2024-05-03 VITALS
WEIGHT: 251.75 LBS | TEMPERATURE: 97.6 F | DIASTOLIC BLOOD PRESSURE: 81 MMHG | BODY MASS INDEX: 32.32 KG/M2 | SYSTOLIC BLOOD PRESSURE: 129 MMHG | HEART RATE: 69 BPM | RESPIRATION RATE: 16 BRPM | OXYGEN SATURATION: 97 %

## 2024-05-03 DIAGNOSIS — Z92.21 PERSONAL HISTORY OF ANTINEOPLASTIC CHEMOTHERAPY: ICD-10-CM

## 2024-05-03 LAB
BASOPHILS # BLD AUTO: 0.05 K/UL — SIGNIFICANT CHANGE UP (ref 0–0.2)
BASOPHILS NFR BLD AUTO: 0.6 % — SIGNIFICANT CHANGE UP (ref 0–2)
EOSINOPHIL # BLD AUTO: 0.07 K/UL — SIGNIFICANT CHANGE UP (ref 0–0.5)
EOSINOPHIL NFR BLD AUTO: 0.9 % — SIGNIFICANT CHANGE UP (ref 0–6)
HCT VFR BLD CALC: 45.7 % — SIGNIFICANT CHANGE UP (ref 39–50)
HGB BLD-MCNC: 15.5 G/DL — SIGNIFICANT CHANGE UP (ref 13–17)
IMM GRANULOCYTES NFR BLD AUTO: 0.4 % — SIGNIFICANT CHANGE UP (ref 0–0.9)
LYMPHOCYTES # BLD AUTO: 1.75 K/UL — SIGNIFICANT CHANGE UP (ref 1–3.3)
LYMPHOCYTES # BLD AUTO: 22.3 % — SIGNIFICANT CHANGE UP (ref 13–44)
MCHC RBC-ENTMCNC: 29.1 PG — SIGNIFICANT CHANGE UP (ref 27–34)
MCHC RBC-ENTMCNC: 33.9 G/DL — SIGNIFICANT CHANGE UP (ref 32–36)
MCV RBC AUTO: 85.7 FL — SIGNIFICANT CHANGE UP (ref 80–100)
MONOCYTES # BLD AUTO: 0.67 K/UL — SIGNIFICANT CHANGE UP (ref 0–0.9)
MONOCYTES NFR BLD AUTO: 8.5 % — SIGNIFICANT CHANGE UP (ref 2–14)
NEUTROPHILS # BLD AUTO: 5.29 K/UL — SIGNIFICANT CHANGE UP (ref 1.8–7.4)
NEUTROPHILS NFR BLD AUTO: 67.3 % — SIGNIFICANT CHANGE UP (ref 43–77)
NRBC # BLD: 0 /100 WBCS — SIGNIFICANT CHANGE UP (ref 0–0)
PLATELET # BLD AUTO: 306 K/UL — SIGNIFICANT CHANGE UP (ref 150–400)
RBC # BLD: 5.33 M/UL — SIGNIFICANT CHANGE UP (ref 4.2–5.8)
RBC # FLD: 13 % — SIGNIFICANT CHANGE UP (ref 10.3–14.5)
WBC # BLD: 7.86 K/UL — SIGNIFICANT CHANGE UP (ref 3.8–10.5)
WBC # FLD AUTO: 7.86 K/UL — SIGNIFICANT CHANGE UP (ref 3.8–10.5)

## 2024-05-03 PROCEDURE — 99214 OFFICE O/P EST MOD 30 MIN: CPT

## 2024-05-03 NOTE — ASSESSMENT
[FreeTextEntry1] : 68-year-old male presenting for follow up on DLBCL, non-germinal center subtype. Patient had a Sono guided core biopsy of right lymph node of neck on 7/28/21 with pathology resulting in CD5, CD10 negative lymphoma, favor Diffuse large B-cell lymphoma, non-Germinal center subtype. Given the pathology results, he went for an excisional biopsy. PET/CT demonstrated the known R cervical node (SUV 30s) and possibly a 0.9 cm perigastric LN (SUV 5). PT underwent excisional biopsy of right neck LN on 8/18. Path was consistent with DLBCL non-germinal center subtype. He is s/p 5 cycles R-CHOP on 11/22/21. As of most recent imaging on 4/20/2023. patient remains in CR. A small pericardial effusion that has slightly increased since 10/29/2022. Will follow up.  #DLBCL non-germinal center subtype (Stage I vs III?) - Pt is s/p Cycle 1 R-CHOP on 8/30/21 - S/p C2 on 9/20/21 (PET/CT 10/2/21 showed response), C3 on 10/11/21, C4 on 11/1/21, C5 on 11/22/21 - Repeat CT scan done 11/19/21 showed posttreatment changes to multiple right cervical nodes. No pathologically enlarged lymph nodes currently. Small scattered bilateral ground glass opacities, which may be infectious or inflammatory in etiology. No suspicious lymphadenopathy. - Was due for C6 on 12/13/2021 but was admitted to hospital for CAP requiring antibiotics and O2 - Treatment deferred for C6 given symptoms and treatment delay - CT neck/C/A/P 11/13/2023 without evidence of lymphoma  - CR for more than 2 years since last treatment (11/2021) - Discussed that the chance of relapse decreases as more time elapses since chemo treatment - Patient wants the PORT removed - No more imaging unless clinically indicated  - RTC in 6 months     *

## 2024-05-03 NOTE — PHYSICAL EXAM
[Fully active, able to carry on all pre-disease performance without restriction] : Status 0 - Fully active, able to carry on all pre-disease performance without restriction [Normal] : affect appropriate [de-identified] : MO [de-identified] : +R wrist pst surgical changes  s/p carpectomy, no tenderness

## 2024-05-03 NOTE — HISTORY OF PRESENT ILLNESS
[Treatment Protocol] : Treatment Protocol [de-identified] : Mr. Gregory Mercado is a 68 year old man with PMHx of Us esophagus, history of two knee replacements, hip replacements, neck surgery, arm surgery, and lower back surgery, He takes baclofen, nabumetone, naproxen, and sometimes Tylenol extra strength for pain. He also take omeprazole. Patient presents today for initial consultation for recent diagnosis of DLBCL, non-germinal center subtype. Patient had a sonoguided core biopsy of right lymph node of neck on 7/28/21 with pathology resulting in \par  CD5, CD10 negative lymphoma, favor Diffuse large B-cell lymphoma, non-Germinal center subtype. \par  \par  Patient reports he had enlarged right neck mass that began growing 1 month ago. He reported this to his PCP, who proceeded the patient to have sono guide core biopsy. \par  He is a retired  and is now a full time .\par  \par  Started R-CHOP 8/30/21. PET/CT on 10/2/2021- shows response to therapy. Pt had CT scans on neck/abd/pelvis done 11/19/21 which show Posttreatment changes to multiple right cervical nodes. No pathologically enlarged lymph nodes currently. Small scattered bilateral groundglass opacities, which may be infectious or inflammatory in etiology. No suspicious lymphadenopathy. He received Cycle 5 of R-CHOP chemotherapy on 1/122/21. On 12/3/21 developed new onset chest pain and shortness of breath. Was recommended to go to the ER, did not want to go, attempted to arrange outpatient CT PE which was unable to be done. he was admitted from 12/6/21-12/11/21 for new onset pleuritic chest pain and shortness of breath, found to have organizing pneumonia on CT imaging, improved with antibiotics. He is now off supplemental O2. Decision was made to hold further therapy in the setting of PNA. \par  \par  History: 1/14/22: Pt s/p cycle 5 of R-CHOP and tolerated treatment on 11/22/21. Cycle 6 held due to CAP. \par  Pt has noticed new whole body itching, worse at night X 3 weeks. His wife has been using a new laundry softener for the pat month, no other new soaps/lotions/perfumes/foods. Benadryl helps with the itch. Denies fever/chills, No drenching night sweats. No CP/SOB. No lower extremity edema. Appetite is okay. Pt is gaining weight. No abd pain. No diarrhea/constipation. No hematuria, dysuria. No recent/recurrent infections. Energy levels have improved. Denies numbness/tingling of hands or feet. No mouth sores.\par  \par  4/21/22: Mr. Mercado presents for a follow up visit. s/p cycle 5 of R-CHOP, tolerated treatment on 11/22/21. Cycle 6 held due to CAP. Since he was last seen in Jan 2022, he reports no new symptoms, including appetite changes, lymphadenopathy, fevers, chills, night sweats. He was seen by orthopedics for his right wrist swelling; surgical options were discussed, but decision was made to wait until Mediport was removed before proceeding with surgery. He reports that pain is stable. Seen by his PMD, blood work done to r/o autoimmune arthritis; he was concerned since his REILLY was 1:80. Reviewed blood work with him this visit. He is not currently taking any meds aside from Tylenol for his wrist pain and omeprazole for gastric reflux.\par  \par  7/28/22: Since his last visit he continues to feel well. No new lymphadenopathy, appetite changes, fevers, chills or night sweats. He is going to have surgery on his R hand with Dr. Ashford on August 4. No recent infections. He has noticed that he loses his voice when he talks a lot for the last month but overall it isn't bothersome.\par  \par  10/25/22: Patient presents for follow up. He is overall doing very well. He denies significant complaints except some mild numbness in his hands at night. He had R hand surgery (carpectomy) in August successfully. He is doing hand physical therapy. He denies pain, N/V, diarrhea, constipation, fevers, chills, night sweats, unintentional weight loss or poor appetite, swelling, rash, or new nodules or lumps.  [FreeTextEntry1] : R-CHOP: 8/30/21- 11/22/21 (5 cycles) [de-identified] : 4/27/23: Since last visit in January, he states he has been feeling well overall. No longer having R hand pain since his surgery 8 months ago. Last visit had L foot pain that has resolved. He denies fevers, night sweats, wt loss, lymphadenopathy, N/V/C/D, Ab pain, chest pain, SOB, ED visits, infections, or hospitalizations.  He endorses chronic upper back pain follows with Dr. Syed. ======================================================================= 8/4/2023 Visit conducted via [employer] (Radha, [id number]).  68-year-old Mr. SUTHERLAND is seen for the first time as a follow up for DLBCL in remission. He is originally a Dr. Sierra's patient.  He was diagnosed in 07/2021. The pathology was reportedly GCB type. He was treated with 5 cycles of RCHOP. The 6th cycle was held because patient developed CAP. He completed on 11/22/2021 (C5) and achieved complete remission and continues to be in remission clinically and as of last imaging of 04/2023. Today he endorses no change in his health status and offers no complaints.    11/24/2023 Patient returns for follow up after having a scan done. He was treated for Stage I vs III DLBCL with 5 cycles of RCHOP. Sixth cycle was postponed because he developed pneumonia. He has no complaints. He is in CR. His CT NCAP results are stable, no new LAD. He endorsees feeling well and healthy. He wants his PORT removed,   5/3/2024 Visit is conducted via [employer] (No, [id number]) Patient presents for a follow up for DLBCL (stage I vs III) s/p 5 cycles of R-CHOP in 2021 has been in CR since. He has done well in the interim. He endorses no change in his health status except gaining 10 lbs. of weight. However, today he appeared little anemic on physical exam. Labs are not back yet. .    *

## 2024-05-03 NOTE — REASON FOR VISIT
[Follow-Up Visit] : a follow-up visit for [Spouse] : spouse [[employer] ] : provided by [employer]   [FreeTextEntry2] : DLBCL [Interpreters_IDNumber] : [id number] [Interpreters_FullName] : Carlos Eduardo

## 2024-05-03 NOTE — RESULTS/DATA
[FreeTextEntry1] : 5/3/2024 Today's labs are pending.  Last visit CBC: Perfect Today's CBC perfect as well.   11/24/2023 Perfect CBC CT NCAP (11/13/2023)  No evidence disease   8/4/2023  Today's labs reviewed. Normal range CBC, CMP, LDH  IMAGING  EXAM: [id number] - CT NECK SOFT TISSUE  - ORDERED BY: ALYCIA BHATIA  PROCEDURE DATE:  04/20/2023  INTERPRETATION:  CT neck without contrast  CLINICAL INFORMATION: B-cell lymphoma follow-up  TECHNIQUE:  Contiguous axial 3 mm thick sections were obtained through the neck using single helical acquisition.   3 mm sagittal and coronal reconstructions were obtained.  This scan was performed using automatic exposure control (radiation dose reduction software) to obtain a diagnostic image quality scan with patient dose as low as reasonably achievable.  FINDINGS:   CT dated 10/29/2022 available for review.  No neck mass is found.  No pathologically enlarged lymph nodes are found.  The visualized lymph nodes demonstrate no central necrosis or extranodal extension, allowing for the noncontrast technique.  The mucosal surfaces of the upper aerodigestive tract appear symmetric and unremarkable.  The larynx is intact.  The preepiglottic and paralaryngeal spaces are intact.  Laryngeal cartilages remain intact.  The nasopharynx is symmetric.  No lateral retropharyngeal mass is found.  The underlying central skull base is intact.  The petrous temporal bones including mastoid air cells are intact.  The visualized base of brain appears unremarkable.  The parotid and submandibular glands are intact.  The thyroid gland is intact.  The visualized paranasal sinuses are clear.  The nasal cavity is unremarkable.  The cervical spine shows ACDF at C4-5 and diffuse degenerative disc disease and spondylosis.  The lung apices are clear, allowing for the for the neck CT technique.   IMPRESSION: No neck mass or lymphadenopathy is noted. Stable examination.  --- End of Report --- --------------------------------------------------------------------------------------------  EXAM: [id number] - CT ABDOMEN AND PELVIS OC  - ORDERED BY: ALYCIA BHATIA  EXAM: [id number] - CT CHEST  - ORDERED BY: ALYCIA BHATIA   PROCEDURE DATE:  04/20/2023    INTERPRETATION:  CLINICAL INFORMATION: History of diffuse large B-cell lymphoma. Evaluate for recurrent disease.  COMPARISON: CT 10/29/2022, 4/30/2022  CONTRAST/COMPLICATIONS: IV Contrast: None Oral Contrast: NONE Complications: None reported at time of study completion  PROCEDURE: CT of the Chest, Abdomen and Pelvis was performed. Sagittal and coronal reformats were performed.  FINDINGS: Evaluation of the solid organs and vasculature is limited in the absence of intravenous contrast. CHEST: LUNGS AND LARGE AIRWAYS: Patent central airways. No pulmonary nodules. PLEURA: No pleural effusion. VESSELS: Within normal limits. HEART: Heart size is normal. Small pericardial effusion, slightly enlarged since 10/29/2022 and new since 4/30/2022. MEDIASTINUM AND VISHNU: No lymphadenopathy. CHEST WALL AND LOWER NECK: Right chest wall port with catheter tip in the SVC.  ABDOMEN AND PELVIS: LIVER: Normal in size and morphology with small scattered cysts again noted. BILE DUCTS: Normal caliber. GALLBLADDER: Within normal limits. SPLEEN: Within normal limits. PANCREAS: Within normal limits. ADRENALS: Within normal limits. KIDNEYS/URETERS: Bilateral renal cortical cysts and right parapelvic cyst. No hydronephrosis or renal calculi.  BLADDER: Within normal limits. REPRODUCTIVE ORGANS: The prostate is completely obscured by streak artifact from the hip hardware.  BOWEL: No bowel obstruction. Appendix is not visualized. PERITONEUM: No ascites. VESSELS: Scattered atherosclerotic calcifications. RETROPERITONEUM/LYMPH NODES: No lymphadenopathy. ABDOMINAL WALL: Within normal limits. BONES: Degenerative changes. Bilateral hip arthroplasty. Chronic left lateral fifth rib fracture deformity.  IMPRESSION: No recurrent adenopathy in the chest, abdomen and pelvis.  A nonspecific small pericardial effusion is slightly enlarged since 10/29/2022 and new since 4/30/2022.

## 2024-05-06 LAB
ALBUMIN SERPL ELPH-MCNC: 4.3 G/DL
ALP BLD-CCNC: 88 U/L
ALT SERPL-CCNC: 32 U/L
ANION GAP SERPL CALC-SCNC: 13 MMOL/L
AST SERPL-CCNC: 26 U/L
BILIRUB SERPL-MCNC: 0.4 MG/DL
BUN SERPL-MCNC: 15 MG/DL
CALCIUM SERPL-MCNC: 9.5 MG/DL
CHLORIDE SERPL-SCNC: 102 MMOL/L
CO2 SERPL-SCNC: 24 MMOL/L
CREAT SERPL-MCNC: 1.16 MG/DL
EGFR: 68 ML/MIN/1.73M2
GLUCOSE SERPL-MCNC: 102 MG/DL
LDH SERPL-CCNC: 182 U/L
POTASSIUM SERPL-SCNC: 4.5 MMOL/L
PROT SERPL-MCNC: 7.3 G/DL
SODIUM SERPL-SCNC: 140 MMOL/L

## 2024-05-13 ENCOUNTER — APPOINTMENT (OUTPATIENT)
Dept: DERMATOLOGY | Facility: CLINIC | Age: 69
End: 2024-05-13
Payer: MEDICARE

## 2024-05-13 DIAGNOSIS — L85.3 XEROSIS CUTIS: ICD-10-CM

## 2024-05-13 DIAGNOSIS — F45.8 OTHER SOMATOFORM DISORDERS: ICD-10-CM

## 2024-05-13 DIAGNOSIS — L21.9 SEBORRHEIC DERMATITIS, UNSPECIFIED: ICD-10-CM

## 2024-05-13 DIAGNOSIS — L30.9 DERMATITIS, UNSPECIFIED: ICD-10-CM

## 2024-05-13 PROCEDURE — 99214 OFFICE O/P EST MOD 30 MIN: CPT

## 2024-05-13 RX ORDER — KETOCONAZOLE 20.5 MG/ML
2 SHAMPOO, SUSPENSION TOPICAL
Qty: 1 | Refills: 5 | Status: ACTIVE | COMMUNITY
Start: 2023-09-14 | End: 1900-01-01

## 2024-05-13 RX ORDER — MOMETASONE FUROATE 1 MG/G
0.1 OINTMENT TOPICAL
Qty: 2 | Refills: 3 | Status: ACTIVE | COMMUNITY
Start: 2024-05-13 | End: 1900-01-01

## 2024-05-13 RX ORDER — GABAPENTIN 100 MG/1
100 CAPSULE ORAL
Qty: 30 | Refills: 1 | Status: ACTIVE | COMMUNITY
Start: 2024-05-13 | End: 1900-01-01

## 2024-05-13 NOTE — HISTORY OF PRESENT ILLNESS
[FreeTextEntry1] : follow up [de-identified] : 69yo M presents for follow up.  ID [id number] here with his wife last seen 2/12/2024  nothing makes it worse or better, he is consistently itchy; is scratching in room most itchy areas are L shoulder and scalp feels like as he scratches it swells up more pt has history of cervical pain and s/p surgery, but still has numbness and tingling down L shoulder and arm treatments tried: tried allegra and claritin that made him drowsy in past on triamcinolone only right now uses Dove soap no moisturizer, not doing sarna anymore, has Cerave but not using no one else itchy at home  #seb derm - not on anything currently, felt ketoconazole didn't help, ran out of it #rash on right lower leg - resolved s/p triam cream  takes nabumetone occasionally for back pain, takes 1-2 times per week as needed; takes tylenol PRN for back pain no other meds

## 2024-05-13 NOTE — ASSESSMENT
[FreeTextEntry1] : #Eczematous dermatitis, chronic, flaring; scalp and L shoulder + arm, and R wrist possible #neurogenic component from C-spine pain - pt wants biopsy, would defer today given appearance is c/w an eczematous process -START gabapentin 100 mg qHS, increase to BID if tolerating, counseled may make him drowsy; med interactions checked  -cont sarna lotion PRN itch -INCREASE to mometasone 0.1% ointment BID PRN for body, not face or groin, med side effects discussed such as skin thinning, telangiectasias, hypopigmentation; instructions discussed -gentle skin care, liberal emollients reviewed, recommended Cerave and Cetaphil  #Seborrheic dermatitis, scalp, chronic, flaring -I discussed the chronic nature and course of this condition -continue Ketoconazole 2% shampoo 2-3 times a week to the scalp. Leave on for at least 5 mins before rinsing out. Potential SE reviewed including dryness, irritation. Alternate with head and shoulders or DHS zinc shampoo. If not covered, can use otc Nizoral shampoo. -continue mometasone solution daily to AA until improved then as needed, SED, do not get on face. -Proper medication use and side effects discussed, including cutaneous atrophy, telangiectasias, and striae. Avoid long-term use.  #Xerosis cutis, generalized, chronic, not at treatment goal - Education and counseling - Gentle skin care reviewed - Emphasized to use gentle, fragrance-free personal care products (including soap and laundry detergent). Avoid scrubbing/rubbing skin, no loofas or washcloths. Limit showers to once daily with lukewarm water - [place] use of bland emollients. List of recommended moisturizers provided  RTC in 1-2 months.

## 2024-05-13 NOTE — PHYSICAL EXAM
[Alert] : alert [Oriented x 3] : ~L oriented x 3 [Declined] : declined [FreeTextEntry3] : Focused exam: - bilateral temporal scalp with thin rough plaques  - L shoulder and chest with pink scaly papules, especially on L shoulder, linear excoriations, and rough plaque on L upper arm

## 2024-05-17 ENCOUNTER — APPOINTMENT (OUTPATIENT)
Dept: ELECTROPHYSIOLOGY | Facility: CLINIC | Age: 69
End: 2024-05-17

## 2024-05-17 ENCOUNTER — NON-APPOINTMENT (OUTPATIENT)
Age: 69
End: 2024-05-17

## 2024-05-21 ENCOUNTER — OUTPATIENT (OUTPATIENT)
Dept: OUTPATIENT SERVICES | Facility: HOSPITAL | Age: 69
LOS: 1 days | End: 2024-05-21
Payer: COMMERCIAL

## 2024-05-21 ENCOUNTER — APPOINTMENT (OUTPATIENT)
Dept: CT IMAGING | Facility: CLINIC | Age: 69
End: 2024-05-21

## 2024-05-21 DIAGNOSIS — I70.90 UNSPECIFIED ATHEROSCLEROSIS: ICD-10-CM

## 2024-05-21 DIAGNOSIS — R06.09 OTHER FORMS OF DYSPNEA: ICD-10-CM

## 2024-05-21 DIAGNOSIS — Z98.890 OTHER SPECIFIED POSTPROCEDURAL STATES: Chronic | ICD-10-CM

## 2024-05-21 DIAGNOSIS — Z91.89 OTHER SPECIFIED PERSONAL RISK FACTORS, NOT ELSEWHERE CLASSIFIED: ICD-10-CM

## 2024-05-21 PROCEDURE — 75574 CT ANGIO HRT W/3D IMAGE: CPT

## 2024-05-21 PROCEDURE — 75574 CT ANGIO HRT W/3D IMAGE: CPT | Mod: 26

## 2024-06-05 NOTE — PHYSICAL EXAM
[Normal] : normal conjunctiva [Normal Venous Pressure] : normal venous pressure [Normal S1, S2] : normal S1, S2 [No Murmur] : no murmur [No Rub] : no rub [No Gallop] : no gallop [Clear Lung Fields] : clear lung fields [Good Air Entry] : good air entry [No Respiratory Distress] : no respiratory distress  [Soft] : abdomen soft [Normal Gait] : normal gait [No Edema] : no edema [No Cyanosis] : no cyanosis [No Clubbing] : no clubbing [No Varicosities] : no varicosities [No Rash] : no rash [Moves all extremities] : moves all extremities [No Focal Deficits] : no focal deficits [Normal Speech] : normal speech [Alert and Oriented] : alert and oriented

## 2024-06-07 ENCOUNTER — APPOINTMENT (OUTPATIENT)
Dept: CARDIOLOGY | Facility: CLINIC | Age: 69
End: 2024-06-07
Payer: MEDICARE

## 2024-06-07 VITALS
WEIGHT: 251.32 LBS | TEMPERATURE: 97.3 F | HEIGHT: 74 IN | DIASTOLIC BLOOD PRESSURE: 80 MMHG | HEART RATE: 69 BPM | OXYGEN SATURATION: 96 % | SYSTOLIC BLOOD PRESSURE: 126 MMHG | BODY MASS INDEX: 32.25 KG/M2

## 2024-06-07 DIAGNOSIS — R06.09 OTHER FORMS OF DYSPNEA: ICD-10-CM

## 2024-06-07 DIAGNOSIS — I31.39 OTHER PERICARDIAL EFFUSION (NONINFLAMMATORY): ICD-10-CM

## 2024-06-07 DIAGNOSIS — Z87.39 PERSONAL HISTORY OF OTHER DISEASES OF THE MUSCULOSKELETAL SYSTEM AND CONNECTIVE TISSUE: ICD-10-CM

## 2024-06-07 DIAGNOSIS — M18.11 UNILATERAL PRIMARY OSTEOARTHRITIS OF FIRST CARPOMETACARPAL JOINT, RIGHT HAND: ICD-10-CM

## 2024-06-07 DIAGNOSIS — Z91.89 OTHER SPECIFIED PERSONAL RISK FACTORS, NOT ELSEWHERE CLASSIFIED: ICD-10-CM

## 2024-06-07 DIAGNOSIS — R06.02 SHORTNESS OF BREATH: ICD-10-CM

## 2024-06-07 DIAGNOSIS — M54.2 CERVICALGIA: ICD-10-CM

## 2024-06-07 DIAGNOSIS — C85.10 UNSPECIFIED B-CELL LYMPHOMA, UNSPECIFIED SITE: ICD-10-CM

## 2024-06-07 DIAGNOSIS — G47.34 IDIOPATHIC SLEEP RELATED NONOBSTRUCTIVE ALVEOLAR HYPOVENTILATION: ICD-10-CM

## 2024-06-07 DIAGNOSIS — M65.311 TRIGGER THUMB, RIGHT THUMB: ICD-10-CM

## 2024-06-07 DIAGNOSIS — Z92.89 PERSONAL HISTORY OF OTHER MEDICAL TREATMENT: ICD-10-CM

## 2024-06-07 DIAGNOSIS — R40.0 SOMNOLENCE: ICD-10-CM

## 2024-06-07 DIAGNOSIS — M65.341 TRIGGER FINGER, RIGHT RING FINGER: ICD-10-CM

## 2024-06-07 DIAGNOSIS — G89.29 CERVICALGIA: ICD-10-CM

## 2024-06-07 DIAGNOSIS — I70.90 UNSPECIFIED ATHEROSCLEROSIS: ICD-10-CM

## 2024-06-07 DIAGNOSIS — E78.00 PURE HYPERCHOLESTEROLEMIA, UNSPECIFIED: ICD-10-CM

## 2024-06-07 PROCEDURE — G2211 COMPLEX E/M VISIT ADD ON: CPT

## 2024-06-07 PROCEDURE — 99214 OFFICE O/P EST MOD 30 MIN: CPT

## 2024-06-07 PROCEDURE — 93000 ELECTROCARDIOGRAM COMPLETE: CPT

## 2024-06-07 RX ORDER — ASPIRIN ENTERIC COATED TABLETS 81 MG 81 MG/1
81 TABLET, DELAYED RELEASE ORAL
Qty: 90 | Refills: 1 | Status: COMPLETED | COMMUNITY
Start: 2024-03-08 | End: 2024-06-07

## 2024-06-07 NOTE — REVIEW OF SYSTEMS
[Feeling Fatigued] : feeling fatigued [Dyspnea on exertion] : dyspnea during exertion [Chest Discomfort] : chest discomfort [Joint Pain] : joint pain [Muscle Cramps] : muscle cramps [Myalgia] : myalgia [Negative] : Heme/Lymph [Weight Gain (___ Lbs)] : [unfilled] ~Ulb weight gain [SOB] : shortness of breath [Lower Ext Edema] : no extremity edema [Orthopnea] : no orthopnea [PND] : no PND [Cough] : no cough

## 2024-06-07 NOTE — REASON FOR VISIT
[Spouse] : spouse [[employer] ] : provided by [employer]   [Time Spent: ____ minutes] : Total time spent using  services: [unfilled] minutes. The patient's primary language is not English thus required  services. [Interpreters_IDNumber] : [id number] [FreeTextEntry3] : Dr. Mayen [Interpreters_FullName] : Eliud [TWNoteComboBox1] : Beninese [FreeTextEntry1] : --------------------------------------------------------------------- BAO SUTHERLAND is a 69 year old man with a history of polyarthritis (s/p bilateral hip replacement, shoulder, wrist, and knee surgery) and DLBCL in 2021 seen for follow up of pericardial effusion and prior anthracycline chemotherapy.  Prior Cancer Treatments: ------------------------------------------------------------------------ Chemo/targeted therapy: 8/30/21-11/22/21 RCHOP (x5)  ------------------------------------------------------------------------

## 2024-06-07 NOTE — HISTORY OF PRESENT ILLNESS
[FreeTextEntry1] : Interval History: Resumed rosuvastatin 5 mg daily, no issues noted Stress test no ischemia and coronary CT angiogram showed no obstructive CAD and calcium score was 23 Agatston units. A repeat echo showed normal LVEF and no significant changes. No pericardial effusion. He completed home sleep study which was consistent with severe sleep apnea. He reports not having a follow up appointment with the physician. CPAP was recommended. He reports his shortness of breath and chest pain symptoms are somewhat better overall. He continues to have intermittent exertional dyspnea.  History: He developed pleuritic chest pain. cough, and shortness of breath following the 5th cycle of RCHOP in 2021. He was admitted to [facility] and CT Chest showed peripheral lower lobe ground glass opacities. He was treated for pneumonia with antibiotics and symptoms improved.   Follow up CT C/A/P in 2023 showed a new/enlarged pericardial effusion, which was not present in 2022. There was no lymphadenopathy. Echocardiogram showed a small pericardial effusion, anterior to the right ventricle, and normal LV function. Compared to pre-chemotherapy, pericardial effusion was new.  He sometimes has difficulty catching his breath. 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 The shortness of breath occurs both with exertion and when laying flat on his back. He denies edema, chest pain, palpitations, dizziness, pleurisy, cough, fever, or diarrhea/GI upset.  He is not aware of hypertension, diabetes, hypercholesterolemia, and was not a smoker. His father  of a heart problem at age 70. History of bilateral hip replacement, operations on both shoulders, the right wrist, both knees, and cervical spine.  He is a retired  and is now a . Lives in [place] with his wife.  2023: Follow up echo with normal EF and trace pericardial effusion, not increased from prior. Exertional dyspnea has improved, though his legs hurt when climbing up stairs. No chest pain. No palpitations. No new medications. We discussed atherosclerotic plaque on CT scans and role in CVD.  3/8/2024: He complains of shortness of breath and chest discomfort with exertion, such as walking up stairs. After 12-13 steps, has to stop and catch his breath. He reports symptoms for the past month. He reports not taking rosuvastatin for the past few weeks due to body pain. Body pain not different since stopping. He also reports excessive daytime sleepiness, despite sleeping 8 hours or more each night. His wife reports he does snore loudly. No witnessed apneic episodes. He reports he was diagnosed with sleep apnea previously, but never followed up for treatment. No recent sleep study.  Lymphoma in remission.  (STOP BANG score intermediate risk for OLEGARIO)  Cardiovascular Summary: ---------------------------------------------- EC2024: NSR 68 bpm, normal ECG 3/8/2024: NSR, old septal infarct (unchanged from prior) 2023: NSR 70 bpm, old septal infarct, no change from prior 2023: NSR, possible old septal infarct ---------------------------------------------- Echo: 3/28/2024: EF 63 %, mild diastolic dysfunction, no pericardial effusion, ascending aorta 3.8 cm 2023: EF 60 %, trace pericardial effusion, limited imaging windows precluded GLS 2023: EF 62 %, trace pericardial effusion, mildly increased LV wall thickness 2021: EF 60 %, no pericardial effusion ---------------------------------------------- CT: 2023: CTCA - LM minimal, LAD mild, Ramus 25-50 %, LCX normal, RCA normal, calcium score 23 Agatston units 2023: small pericardial effusion, enlarged sine 10/29/22, new since , atherosclerotic calcifications ------------------------------------------------- Stress: 3/28/2024: nuclear - normal perfusion, no ischemia

## 2024-06-07 NOTE — ASSESSMENT
[FreeTextEntry1] : ----------------------------------------------- 69 year old man with small pericardial effusion on CT and echo following R-CHOP for B cell lymphoma in 2021, atherosclerotic plaque, elevated BP and LDL with chronic symptoms of exertional dyspnea and intermittent chest pain. Nuclear stress test normal and coronary CT angiogram showed no CAD. A repeat echo showed normal systolic function and no evidence of pericardial effusion or other cardiac cause of his symptoms.  1. Risk for cardiomyopathy: normal LV function on May 2024 echo 2. pericardial effusion, resolved 3. atherosclerotic calcifications on chest CT - , HDL 34, non-, Normal CRP and A1c. 4. Exertional dyspnea and chest discomfort - stress test and CCTA normal. 5. DLBCL in remission s/p RCHOP 2021 6. OLEGARIO for which CPAP is recommended.  7. Obesity, BMI 32.27  Recommendations: 1. Follow up with Pulmonary/Sleep medicine to initiate CPAP - referral made to Dr. Gosia Chacon 2. continue rosuvastatin 5 mg daily 3. Try to reduce weight through dietary/lifestyle interventions 4. Follow up with me in 1 year or as needed  Above discussed with the patient and his wife and all questions answered to the best of my ability and to their apparent satisfaction.

## 2024-06-12 ENCOUNTER — APPOINTMENT (OUTPATIENT)
Dept: PULMONOLOGY | Facility: CLINIC | Age: 69
End: 2024-06-12
Payer: MEDICARE

## 2024-06-12 DIAGNOSIS — R06.00 DYSPNEA, UNSPECIFIED: ICD-10-CM

## 2024-06-12 DIAGNOSIS — G47.33 OBSTRUCTIVE SLEEP APNEA (ADULT) (PEDIATRIC): ICD-10-CM

## 2024-06-12 PROCEDURE — 99213 OFFICE O/P EST LOW 20 MIN: CPT

## 2024-06-12 NOTE — ASSESSMENT
[FreeTextEntry1] : This is a 69 year old male with a significant past medical history of OLEGARIO and exertional dyspnea who comes in for a sleep evaluation/establish care.  #Obstructive sleep apnea-patient was found to have moderate to severe obstructive sleep apnea based off of his WatchPAT study.  Discussed results at length and indicated that he would benefit from a home APAP study.  Discussed at length the need for a different type of mask as he previously could not tolerate a full facemask.  Discussed with him the risk factors of severe obstructive sleep apnea which include cardiovascular disease.  #Exertional dyspnea-patient indicates that he has been having exertional dyspnea for some time but has not been evaluated.  He was a previous smoker but quit 30 years ago.  He should undergo pulmonary function test and should be seen in the office in order for full pulmonary evaluation.  Patient will follow-up with me after home APAP study and with pulmonary function test in order to determine his underlying pulmonary pathology.

## 2024-06-12 NOTE — HISTORY OF PRESENT ILLNESS
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  He recently underwent a WatchPAT study performed by his cardiologist.  He indicates that he has no difficulty sleeping but does not feel refreshed when waking up in the morning.  He feels sleepy and wakes up with a dry mouth.  He indicates that he had a sleep study several years ago and was given a CPAP machine but was not able to tolerate it.  Patient also indicates that he has shortness of breath upon exertion.  Indicates that his cardiologist states that he probably has an underlying lung pathology that needs to be evaluated.  She has not had a pulmonary function test previously. [YearQuit] : 1990 [Awakes with Headache] : does not awaken with headache [Difficulty Maintaining Sleep] : does not have difficulty maintaining sleep [Fatigue] : no fatigue [Frequent Nocturnal Awakening] : denies frequent nocturnal awakening [Tired while Driving] : not tired while driving [DIS] : does not have difficulty initiating sleep [DMS] : does not have difficulty maintaining sleep [Unusual Sleep Behavior] : no unusual sleep behavior [Hypersomnolence] : no hypersomnolence [Cataplexy] : no cataplexy [Sleep Paralysis] : no sleep paralysis [Hypnagogic Hallucinations] : no hypnagogic hallucinations [Hypnopompic Hallucinations] : no hypnopompic hallucinations [Lower Extremity Discomfort] : does not have lower extremity discomfort [Irresistible urge to move legs] : does not have irresistible urge to move legs [LE discomfort relieved by movement] : denies lower extremity discomfort relieved by movement [Sleep Disturbances due to LE symptoms] : denies sleep disturbances due to lower extremity symptoms [TextBox_77] : 10pm [TextBox_79] : 9:00am [TextBox_81] : 15 [TextBox_89] : 3-4 [TextBox_100] : 05/2024 [TextBox_108] : 48.1 [TextBox_112] : 7.5 [TextBox_116] : 83 [ESS] : 15

## 2024-06-12 NOTE — REASON FOR VISIT
[Initial] : an initial visit [Sleep Apnea] : sleep apnea [Home] : at home, [unfilled] , at the time of the visit. [Medical Office: ([facility])___] : at the medical office located in  [Family Member] : family member [Patient] : the patient

## 2024-06-12 NOTE — REVIEW OF SYSTEMS
[Obesity] : obesity [Negative] : Psychiatric [Recent Wt Gain (___ Lbs)] : ~T recent [unfilled] lb weight gain

## 2024-07-15 ENCOUNTER — APPOINTMENT (OUTPATIENT)
Dept: DERMATOLOGY | Facility: CLINIC | Age: 69
End: 2024-07-15
Payer: MEDICARE

## 2024-07-15 DIAGNOSIS — L30.9 DERMATITIS, UNSPECIFIED: ICD-10-CM

## 2024-07-15 DIAGNOSIS — L21.9 SEBORRHEIC DERMATITIS, UNSPECIFIED: ICD-10-CM

## 2024-07-15 DIAGNOSIS — L82.1 OTHER SEBORRHEIC KERATOSIS: ICD-10-CM

## 2024-07-15 DIAGNOSIS — L85.3 XEROSIS CUTIS: ICD-10-CM

## 2024-07-15 PROCEDURE — 99213 OFFICE O/P EST LOW 20 MIN: CPT

## 2024-07-30 ENCOUNTER — APPOINTMENT (OUTPATIENT)
Dept: SLEEP CENTER | Facility: CLINIC | Age: 69
End: 2024-07-30

## 2024-08-21 ENCOUNTER — APPOINTMENT (OUTPATIENT)
Dept: PULMONOLOGY | Facility: CLINIC | Age: 69
End: 2024-08-21
Payer: MEDICARE

## 2024-08-21 VITALS
TEMPERATURE: 97.8 F | HEIGHT: 72 IN | WEIGHT: 240 LBS | OXYGEN SATURATION: 97 % | SYSTOLIC BLOOD PRESSURE: 138 MMHG | BODY MASS INDEX: 32.51 KG/M2 | RESPIRATION RATE: 15 BRPM | DIASTOLIC BLOOD PRESSURE: 80 MMHG | HEART RATE: 64 BPM

## 2024-08-21 VITALS
BODY MASS INDEX: 32.51 KG/M2 | SYSTOLIC BLOOD PRESSURE: 135 MMHG | HEART RATE: 59 BPM | HEIGHT: 72 IN | DIASTOLIC BLOOD PRESSURE: 80 MMHG | WEIGHT: 240 LBS

## 2024-08-21 DIAGNOSIS — G47.33 OBSTRUCTIVE SLEEP APNEA (ADULT) (PEDIATRIC): ICD-10-CM

## 2024-08-21 DIAGNOSIS — R06.00 DYSPNEA, UNSPECIFIED: ICD-10-CM

## 2024-08-21 PROCEDURE — 94729 DIFFUSING CAPACITY: CPT

## 2024-08-21 PROCEDURE — 94060 EVALUATION OF WHEEZING: CPT

## 2024-08-21 PROCEDURE — ZZZZZ: CPT

## 2024-08-21 PROCEDURE — 94726 PLETHYSMOGRAPHY LUNG VOLUMES: CPT

## 2024-08-21 PROCEDURE — 99214 OFFICE O/P EST MOD 30 MIN: CPT | Mod: 25

## 2024-08-21 NOTE — HISTORY OF PRESENT ILLNESS
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  The patient reports that he has been sleeping well lately but still experiences breathing pauses and low oxygen levels. He mentions that he gets tired when walking fast. He recalls a history of cancer treatment during childhood, including chemotherapy, and a hospitalization where he was given oxygen. He returned the oxygen equipment as he felt he no longer needed it.  During a walking test in the office, the patient felt a little uncomfortable and tired, particularly in his legs. He notes that his knees are displaced.   Of note: He indicates that he has no difficulty sleeping but does not feel refreshed when waking up in the morning.  He feels sleepy and wakes up with a dry mouth.  He indicates that he had a sleep study several years ago and was given a CPAP machine but was not able to tolerate it.  Patient also indicates that he has shortness of breath upon exertion.  Indicates that his cardiologist states that he probably has an underlying lung pathology that needs to be evaluated.  She has not had a pulmonary function test previously. [YearQuit] : 1990 [Awakes with Headache] : does not awaken with headache [Difficulty Maintaining Sleep] : does not have difficulty maintaining sleep [Fatigue] : no fatigue [Frequent Nocturnal Awakening] : denies frequent nocturnal awakening [Tired while Driving] : not tired while driving [DIS] : does not have difficulty initiating sleep [DMS] : does not have difficulty maintaining sleep [Unusual Sleep Behavior] : no unusual sleep behavior [Hypersomnolence] : no hypersomnolence [Cataplexy] : no cataplexy [Sleep Paralysis] : no sleep paralysis [Hypnagogic Hallucinations] : no hypnagogic hallucinations [Hypnopompic Hallucinations] : no hypnopompic hallucinations [Lower Extremity Discomfort] : does not have lower extremity discomfort [Irresistible urge to move legs] : does not have irresistible urge to move legs [LE discomfort relieved by movement] : denies lower extremity discomfort relieved by movement [Sleep Disturbances due to LE symptoms] : denies sleep disturbances due to lower extremity symptoms [TextBox_77] : 10pm [TextBox_79] : 9:00am [TextBox_81] : 15 [TextBox_89] : 3-4 [TextBox_100] : 05/2024 [TextBox_108] : 48.1 [TextBox_112] : 7.5 [TextBox_116] : 83 [ESS] : 15

## 2024-08-21 NOTE — REVIEW OF SYSTEMS
[Recent Wt Gain (___ Lbs)] : ~T recent [unfilled] lb weight gain [Obesity] : obesity [Negative] : Psychiatric

## 2024-08-21 NOTE — ASSESSMENT
[FreeTextEntry1] : This is a 69 year old male with a significant past medical history of OLEGARIO and exertional dyspnea who comes in for a sleep evaluation/establish care.  #Obstructive sleep apnea-patient was found to have moderate to severe obstructive sleep apnea based off of his WatchPAT study.  Discussed at length the need for a different type of mask as he previously could not tolerate a full facemask.  Discussed with him the risk factors of severe obstructive sleep apnea which include cardiovascular disease. Tried to do home APAP but was denied by insurance.  - Patient agreeable to inlab CPAP titration study.   #Exertional dyspnea-patient indicates that he has been having exertional dyspnea for some time but has not been evaluated.  He was a previous smoker but quit 30 years ago.  He should undergo pulmonary function test and should be seen in the office in order for full pulmonary evaluation. - PFTs unremarkable today - ambulation in office showed no oxygen desaturation but no increase in HR. May benefit from CPET to determine if this is a primary cardiovascular issue causing dyspnea vs deconditioning.  The patient will follow up in three to four months after completing the exercise study and sleep study.

## 2024-09-11 ENCOUNTER — RX RENEWAL (OUTPATIENT)
Age: 69
End: 2024-09-11

## 2024-09-11 RX ORDER — ASPIRIN 81 MG/1
81 TABLET, COATED ORAL
Qty: 90 | Refills: 1 | Status: ACTIVE | COMMUNITY
Start: 2024-09-11 | End: 1900-01-01

## 2024-09-26 NOTE — H&P ADULT - PROBLEM/PLAN-5
Patient is status post AVR (B) / coronary artery bypass grafting doing well without complaints   Vital signs stable/afebrile   Regular rate and rhythm without murmurs rubs or gallops  Coarse breath sounds bilaterally   Wounds CDI   Echocardiogram reviewed   A/P:  Doing well without complaints.  Plan per Cardiology   DISPLAY PLAN FREE TEXT

## 2024-10-23 ENCOUNTER — APPOINTMENT (OUTPATIENT)
Dept: PULMONOLOGY | Facility: CLINIC | Age: 69
End: 2024-10-23

## 2024-10-29 ENCOUNTER — OUTPATIENT (OUTPATIENT)
Dept: OUTPATIENT SERVICES | Facility: HOSPITAL | Age: 69
LOS: 1 days | Discharge: HOME | End: 2024-10-29

## 2024-10-29 DIAGNOSIS — Z98.890 OTHER SPECIFIED POSTPROCEDURAL STATES: Chronic | ICD-10-CM

## 2024-11-01 ENCOUNTER — APPOINTMENT (OUTPATIENT)
Dept: HEMATOLOGY ONCOLOGY | Facility: CLINIC | Age: 69
End: 2024-11-01
Payer: MEDICARE

## 2024-11-01 ENCOUNTER — RESULT REVIEW (OUTPATIENT)
Age: 69
End: 2024-11-01

## 2024-11-01 VITALS
RESPIRATION RATE: 16 BRPM | DIASTOLIC BLOOD PRESSURE: 81 MMHG | BODY MASS INDEX: 31.93 KG/M2 | WEIGHT: 235.45 LBS | OXYGEN SATURATION: 97 % | HEART RATE: 67 BPM | TEMPERATURE: 97.6 F | SYSTOLIC BLOOD PRESSURE: 147 MMHG

## 2024-11-01 DIAGNOSIS — C85.10 UNSPECIFIED B-CELL LYMPHOMA, UNSPECIFIED SITE: ICD-10-CM

## 2024-11-01 DIAGNOSIS — Z91.89 OTHER SPECIFIED PERSONAL RISK FACTORS, NOT ELSEWHERE CLASSIFIED: ICD-10-CM

## 2024-11-01 DIAGNOSIS — Z92.21 PERSONAL HISTORY OF ANTINEOPLASTIC CHEMOTHERAPY: ICD-10-CM

## 2024-11-01 LAB
ALBUMIN SERPL ELPH-MCNC: 4.2 G/DL
ALP BLD-CCNC: 105 U/L
ALT SERPL-CCNC: 18 U/L
ANION GAP SERPL CALC-SCNC: 11 MMOL/L
AST SERPL-CCNC: 18 U/L
BASOPHILS # BLD AUTO: 0.05 K/UL — SIGNIFICANT CHANGE UP (ref 0–0.2)
BASOPHILS NFR BLD AUTO: 0.7 % — SIGNIFICANT CHANGE UP (ref 0–2)
BILIRUB SERPL-MCNC: 0.2 MG/DL
BUN SERPL-MCNC: 19 MG/DL
CALCIUM SERPL-MCNC: 9.3 MG/DL
CHLORIDE SERPL-SCNC: 104 MMOL/L
CO2 SERPL-SCNC: 26 MMOL/L
CREAT SERPL-MCNC: 1.26 MG/DL
EGFR: 61 ML/MIN/1.73M2
EOSINOPHIL # BLD AUTO: 0.08 K/UL — SIGNIFICANT CHANGE UP (ref 0–0.5)
EOSINOPHIL NFR BLD AUTO: 1.2 % — SIGNIFICANT CHANGE UP (ref 0–6)
GLUCOSE SERPL-MCNC: 94 MG/DL
HCT VFR BLD CALC: 44.1 % — SIGNIFICANT CHANGE UP (ref 39–50)
HGB BLD-MCNC: 14.8 G/DL — SIGNIFICANT CHANGE UP (ref 13–17)
IMM GRANULOCYTES NFR BLD AUTO: 0.3 % — SIGNIFICANT CHANGE UP (ref 0–0.9)
LDH SERPL-CCNC: 182 U/L
LYMPHOCYTES # BLD AUTO: 1.48 K/UL — SIGNIFICANT CHANGE UP (ref 1–3.3)
LYMPHOCYTES # BLD AUTO: 21.7 % — SIGNIFICANT CHANGE UP (ref 13–44)
MCHC RBC-ENTMCNC: 29 PG — SIGNIFICANT CHANGE UP (ref 27–34)
MCHC RBC-ENTMCNC: 33.6 G/DL — SIGNIFICANT CHANGE UP (ref 32–36)
MCV RBC AUTO: 86.3 FL — SIGNIFICANT CHANGE UP (ref 80–100)
MONOCYTES # BLD AUTO: 0.59 K/UL — SIGNIFICANT CHANGE UP (ref 0–0.9)
MONOCYTES NFR BLD AUTO: 8.7 % — SIGNIFICANT CHANGE UP (ref 2–14)
NEUTROPHILS # BLD AUTO: 4.59 K/UL — SIGNIFICANT CHANGE UP (ref 1.8–7.4)
NEUTROPHILS NFR BLD AUTO: 67.4 % — SIGNIFICANT CHANGE UP (ref 43–77)
NRBC # BLD: 0 /100 WBCS — SIGNIFICANT CHANGE UP (ref 0–0)
NRBC BLD-RTO: 0 /100 WBCS — SIGNIFICANT CHANGE UP (ref 0–0)
PLATELET # BLD AUTO: 248 K/UL — SIGNIFICANT CHANGE UP (ref 150–400)
POTASSIUM SERPL-SCNC: 4.4 MMOL/L
PROT SERPL-MCNC: 7.3 G/DL
RBC # BLD: 5.11 M/UL — SIGNIFICANT CHANGE UP (ref 4.2–5.8)
RBC # FLD: 13.1 % — SIGNIFICANT CHANGE UP (ref 10.3–14.5)
SODIUM SERPL-SCNC: 141 MMOL/L
WBC # BLD: 6.81 K/UL — SIGNIFICANT CHANGE UP (ref 3.8–10.5)
WBC # FLD AUTO: 6.81 K/UL — SIGNIFICANT CHANGE UP (ref 3.8–10.5)

## 2024-11-01 PROCEDURE — 99214 OFFICE O/P EST MOD 30 MIN: CPT

## 2025-01-06 ENCOUNTER — APPOINTMENT (OUTPATIENT)
Dept: PULMONOLOGY | Facility: CLINIC | Age: 70
End: 2025-01-06

## 2025-04-30 ENCOUNTER — OUTPATIENT (OUTPATIENT)
Dept: OUTPATIENT SERVICES | Facility: HOSPITAL | Age: 70
LOS: 1 days | Discharge: HOME | End: 2025-04-30

## 2025-04-30 DIAGNOSIS — Z98.890 OTHER SPECIFIED POSTPROCEDURAL STATES: Chronic | ICD-10-CM

## 2025-05-01 ENCOUNTER — RESULT REVIEW (OUTPATIENT)
Age: 70
End: 2025-05-01

## 2025-05-01 ENCOUNTER — APPOINTMENT (OUTPATIENT)
Dept: HEMATOLOGY ONCOLOGY | Facility: CLINIC | Age: 70
End: 2025-05-01
Payer: MEDICARE

## 2025-05-01 VITALS
WEIGHT: 232.57 LBS | DIASTOLIC BLOOD PRESSURE: 77 MMHG | RESPIRATION RATE: 16 BRPM | BODY MASS INDEX: 31.54 KG/M2 | OXYGEN SATURATION: 97 % | TEMPERATURE: 97.2 F | SYSTOLIC BLOOD PRESSURE: 128 MMHG | HEART RATE: 57 BPM

## 2025-05-01 DIAGNOSIS — Z92.89 PERSONAL HISTORY OF OTHER MEDICAL TREATMENT: ICD-10-CM

## 2025-05-01 DIAGNOSIS — Z92.21 PERSONAL HISTORY OF ANTINEOPLASTIC CHEMOTHERAPY: ICD-10-CM

## 2025-05-01 DIAGNOSIS — Z98.1 ARTHRODESIS STATUS: ICD-10-CM

## 2025-05-01 DIAGNOSIS — C85.10 UNSPECIFIED B-CELL LYMPHOMA, UNSPECIFIED SITE: ICD-10-CM

## 2025-05-01 LAB
BASOPHILS # BLD AUTO: 0.05 K/UL — SIGNIFICANT CHANGE UP (ref 0–0.2)
BASOPHILS NFR BLD AUTO: 0.7 % — SIGNIFICANT CHANGE UP (ref 0–2)
EOSINOPHIL # BLD AUTO: 0.07 K/UL — SIGNIFICANT CHANGE UP (ref 0–0.5)
EOSINOPHIL NFR BLD AUTO: 1 % — SIGNIFICANT CHANGE UP (ref 0–6)
HCT VFR BLD CALC: 44.5 % — SIGNIFICANT CHANGE UP (ref 39–50)
HGB BLD-MCNC: 15.1 G/DL — SIGNIFICANT CHANGE UP (ref 13–17)
IMM GRANULOCYTES NFR BLD AUTO: 0.1 % — SIGNIFICANT CHANGE UP (ref 0–0.9)
LYMPHOCYTES # BLD AUTO: 1.74 K/UL — SIGNIFICANT CHANGE UP (ref 1–3.3)
LYMPHOCYTES # BLD AUTO: 25.8 % — SIGNIFICANT CHANGE UP (ref 13–44)
MCHC RBC-ENTMCNC: 29.4 PG — SIGNIFICANT CHANGE UP (ref 27–34)
MCHC RBC-ENTMCNC: 33.9 G/DL — SIGNIFICANT CHANGE UP (ref 32–36)
MCV RBC AUTO: 86.7 FL — SIGNIFICANT CHANGE UP (ref 80–100)
MONOCYTES # BLD AUTO: 0.57 K/UL — SIGNIFICANT CHANGE UP (ref 0–0.9)
MONOCYTES NFR BLD AUTO: 8.4 % — SIGNIFICANT CHANGE UP (ref 2–14)
NEUTROPHILS # BLD AUTO: 4.31 K/UL — SIGNIFICANT CHANGE UP (ref 1.8–7.4)
NEUTROPHILS NFR BLD AUTO: 64 % — SIGNIFICANT CHANGE UP (ref 43–77)
NRBC BLD AUTO-RTO: 0 /100 WBCS — SIGNIFICANT CHANGE UP (ref 0–0)
PLATELET # BLD AUTO: 256 K/UL — SIGNIFICANT CHANGE UP (ref 150–400)
RBC # BLD: 5.13 M/UL — SIGNIFICANT CHANGE UP (ref 4.2–5.8)
RBC # FLD: 13 % — SIGNIFICANT CHANGE UP (ref 10.3–14.5)
WBC # BLD: 6.75 K/UL — SIGNIFICANT CHANGE UP (ref 3.8–10.5)
WBC # FLD AUTO: 6.75 K/UL — SIGNIFICANT CHANGE UP (ref 3.8–10.5)

## 2025-05-01 PROCEDURE — 99214 OFFICE O/P EST MOD 30 MIN: CPT

## 2025-05-03 LAB
ALBUMIN SERPL ELPH-MCNC: 4.5 G/DL
ALP BLD-CCNC: 95 U/L
ALT SERPL-CCNC: 21 U/L
ANION GAP SERPL CALC-SCNC: 11 MMOL/L
AST SERPL-CCNC: 24 U/L
BILIRUB SERPL-MCNC: 0.4 MG/DL
BUN SERPL-MCNC: 17 MG/DL
CALCIUM SERPL-MCNC: 9.6 MG/DL
CHLORIDE SERPL-SCNC: 105 MMOL/L
CO2 SERPL-SCNC: 24 MMOL/L
CREAT SERPL-MCNC: 1.06 MG/DL
EGFRCR SERPLBLD CKD-EPI 2021: 75 ML/MIN/1.73M2
GLUCOSE SERPL-MCNC: 88 MG/DL
LDH SERPL-CCNC: 142 U/L
POTASSIUM SERPL-SCNC: 4.4 MMOL/L
PROT SERPL-MCNC: 7.1 G/DL
SODIUM SERPL-SCNC: 140 MMOL/L

## 2025-05-05 ENCOUNTER — APPOINTMENT (OUTPATIENT)
Dept: GASTROENTEROLOGY | Facility: CLINIC | Age: 70
End: 2025-05-05
Payer: MEDICARE

## 2025-05-05 ENCOUNTER — NON-APPOINTMENT (OUTPATIENT)
Age: 70
End: 2025-05-05

## 2025-05-05 VITALS
WEIGHT: 229 LBS | HEART RATE: 68 BPM | BODY MASS INDEX: 31.02 KG/M2 | DIASTOLIC BLOOD PRESSURE: 75 MMHG | HEIGHT: 72 IN | SYSTOLIC BLOOD PRESSURE: 125 MMHG | OXYGEN SATURATION: 98 %

## 2025-05-05 DIAGNOSIS — K22.70 BARRETT'S ESOPHAGUS W/OUT DYSPLASIA: ICD-10-CM

## 2025-05-05 DIAGNOSIS — Z12.11 ENCOUNTER FOR SCREENING FOR MALIGNANT NEOPLASM OF COLON: ICD-10-CM

## 2025-05-05 PROCEDURE — 99204 OFFICE O/P NEW MOD 45 MIN: CPT

## 2025-05-05 RX ORDER — POLYETHYLENE GLYCOL 3350 AND ELECTROLYTES WITH LEMON FLAVOR 236; 22.74; 6.74; 5.86; 2.97 G/4L; G/4L; G/4L; G/4L; G/4L
236 POWDER, FOR SOLUTION ORAL
Qty: 1 | Refills: 0 | Status: ACTIVE | COMMUNITY
Start: 2025-05-05 | End: 1900-01-01

## 2025-05-22 ENCOUNTER — APPOINTMENT (OUTPATIENT)
Dept: NEUROSURGERY | Facility: CLINIC | Age: 70
End: 2025-05-22
Payer: MEDICARE

## 2025-05-22 VITALS
TEMPERATURE: 97.3 F | DIASTOLIC BLOOD PRESSURE: 81 MMHG | WEIGHT: 230 LBS | OXYGEN SATURATION: 96 % | HEART RATE: 72 BPM | HEIGHT: 72 IN | BODY MASS INDEX: 31.15 KG/M2 | SYSTOLIC BLOOD PRESSURE: 131 MMHG

## 2025-05-22 DIAGNOSIS — M43.12 SPONDYLOLISTHESIS, CERVICAL REGION: ICD-10-CM

## 2025-05-22 DIAGNOSIS — M25.512 PAIN IN LEFT SHOULDER: ICD-10-CM

## 2025-05-22 PROCEDURE — 99213 OFFICE O/P EST LOW 20 MIN: CPT

## 2025-06-06 ENCOUNTER — APPOINTMENT (OUTPATIENT)
Dept: CARDIOLOGY | Facility: CLINIC | Age: 70
End: 2025-06-06
Payer: MEDICARE

## 2025-06-06 VITALS
SYSTOLIC BLOOD PRESSURE: 110 MMHG | BODY MASS INDEX: 31.83 KG/M2 | TEMPERATURE: 97.5 F | DIASTOLIC BLOOD PRESSURE: 67 MMHG | OXYGEN SATURATION: 97 % | HEIGHT: 72 IN | HEART RATE: 65 BPM | WEIGHT: 235.01 LBS

## 2025-06-06 PROCEDURE — 93000 ELECTROCARDIOGRAM COMPLETE: CPT

## 2025-06-06 PROCEDURE — 99214 OFFICE O/P EST MOD 30 MIN: CPT

## 2025-06-06 PROCEDURE — G2211 COMPLEX E/M VISIT ADD ON: CPT

## 2025-07-02 ENCOUNTER — APPOINTMENT (OUTPATIENT)
Dept: NEUROSURGERY | Facility: CLINIC | Age: 70
End: 2025-07-02
Payer: MEDICARE

## 2025-07-02 VITALS
SYSTOLIC BLOOD PRESSURE: 121 MMHG | DIASTOLIC BLOOD PRESSURE: 74 MMHG | HEART RATE: 62 BPM | BODY MASS INDEX: 31.83 KG/M2 | OXYGEN SATURATION: 96 % | TEMPERATURE: 97.6 F | HEIGHT: 72 IN | WEIGHT: 235 LBS

## 2025-07-02 PROCEDURE — 99214 OFFICE O/P EST MOD 30 MIN: CPT

## 2025-07-25 ENCOUNTER — RESULT REVIEW (OUTPATIENT)
Age: 70
End: 2025-07-25

## 2025-07-25 ENCOUNTER — TRANSCRIPTION ENCOUNTER (OUTPATIENT)
Age: 70
End: 2025-07-25

## 2025-07-25 ENCOUNTER — APPOINTMENT (OUTPATIENT)
Dept: GASTROENTEROLOGY | Facility: HOSPITAL | Age: 70
End: 2025-07-25

## 2025-07-25 ENCOUNTER — OUTPATIENT (OUTPATIENT)
Dept: OUTPATIENT SERVICES | Facility: HOSPITAL | Age: 70
LOS: 1 days | End: 2025-07-25
Payer: COMMERCIAL

## 2025-07-25 VITALS
SYSTOLIC BLOOD PRESSURE: 174 MMHG | OXYGEN SATURATION: 95 % | HEART RATE: 58 BPM | RESPIRATION RATE: 15 BRPM | WEIGHT: 229.94 LBS | HEIGHT: 72 IN | DIASTOLIC BLOOD PRESSURE: 80 MMHG | TEMPERATURE: 97 F

## 2025-07-25 VITALS
SYSTOLIC BLOOD PRESSURE: 126 MMHG | OXYGEN SATURATION: 97 % | RESPIRATION RATE: 20 BRPM | HEART RATE: 60 BPM | DIASTOLIC BLOOD PRESSURE: 74 MMHG

## 2025-07-25 DIAGNOSIS — Z98.890 OTHER SPECIFIED POSTPROCEDURAL STATES: Chronic | ICD-10-CM

## 2025-07-25 DIAGNOSIS — K22.70 BARRETT'S ESOPHAGUS WITHOUT DYSPLASIA: ICD-10-CM

## 2025-07-25 DIAGNOSIS — Z12.11 ENCOUNTER FOR SCREENING FOR MALIGNANT NEOPLASM OF COLON: ICD-10-CM

## 2025-07-25 PROCEDURE — 43239 EGD BIOPSY SINGLE/MULTIPLE: CPT

## 2025-07-25 PROCEDURE — 91040 ESOPH BALLOON DISTENSION TST: CPT | Mod: 26

## 2025-07-25 PROCEDURE — 45380 COLONOSCOPY AND BIOPSY: CPT | Mod: XS,PT

## 2025-07-25 PROCEDURE — 88305 TISSUE EXAM BY PATHOLOGIST: CPT | Mod: 26

## 2025-07-25 PROCEDURE — 91040 ESOPH BALLOON DISTENSION TST: CPT

## 2025-07-25 PROCEDURE — 88305 TISSUE EXAM BY PATHOLOGIST: CPT

## 2025-07-25 PROCEDURE — 45385 COLONOSCOPY W/LESION REMOVAL: CPT

## 2025-07-25 PROCEDURE — 0753T DGTZ GLS MCRSCP SLD LEVEL IV: CPT

## 2025-07-25 PROCEDURE — C1889: CPT

## 2025-07-25 PROCEDURE — 45380 COLONOSCOPY AND BIOPSY: CPT | Mod: 59

## 2025-07-25 PROCEDURE — 45385 COLONOSCOPY W/LESION REMOVAL: CPT | Mod: PT

## 2025-07-25 DEVICE — CATH ENDOFLIP MEASURE 16MM: Type: IMPLANTABLE DEVICE | Status: FUNCTIONAL

## 2025-07-25 DEVICE — NET RETRV ROT ROTH 2.5MMX230CM: Type: IMPLANTABLE DEVICE | Status: FUNCTIONAL

## 2025-07-25 RX ADMIN — Medication 30 MILLILITER(S): at 11:04

## 2025-07-29 ENCOUNTER — NON-APPOINTMENT (OUTPATIENT)
Age: 70
End: 2025-07-29

## 2025-07-29 LAB — SURGICAL PATHOLOGY STUDY: SIGNIFICANT CHANGE UP

## 2025-08-04 DIAGNOSIS — K21.9 GASTRO-ESOPHAGEAL REFLUX DISEASE W/OUT ESOPHAGITIS: ICD-10-CM

## (undated) DEVICE — SENSOR O2 FINGER ADULT

## (undated) DEVICE — BALLOON US ENDO

## (undated) DEVICE — SUT MONOCRYL 4-0 27" PS-2 UNDYED

## (undated) DEVICE — CATH IV SAFE BC 22G X 1" (BLUE)

## (undated) DEVICE — FORCEP RADIAL JAW 4 JUMBO 2.8MM 3.2MM 240CM ORANGE DISP

## (undated) DEVICE — TUBING SUCTION CONN 6FT STERILE

## (undated) DEVICE — TUBING SUCTION 20FT

## (undated) DEVICE — SHAVER BLADE S&N POWERMINI FULL RADIUS 2.9MM (RED)

## (undated) DEVICE — BRUSH COLONOSCOPY CYTOLOGY

## (undated) DEVICE — SOL INJ NS 0.9% 500ML 2 PORT

## (undated) DEVICE — ENDOCUFF VISION SZ 3 SM PRPL

## (undated) DEVICE — DRSG WEBRIL 4"

## (undated) DEVICE — SOL IRR POUR NS 0.9% 500ML

## (undated) DEVICE — POLY TRAP ETRAP

## (undated) DEVICE — S&N ARTHROCARE WAND 60 DEGREE

## (undated) DEVICE — TOURNIQUET CUFF 24" DUAL PORT DUAL BLADDER W PLC (BLACK)

## (undated) DEVICE — DRSG KLING 3"

## (undated) DEVICE — IRRIGATOR BIO SHIELD

## (undated) DEVICE — BITE BLOCK ADULT 20 X 27MM (GREEN)

## (undated) DEVICE — SYR ALLIANCE II INFLATION 60ML

## (undated) DEVICE — BLADE SURGICAL #15 CARBON

## (undated) DEVICE — SUT POLYSORB 4-0 18" P-13 UNDYED

## (undated) DEVICE — VENODYNE/SCD SLEEVE CALF LARGE

## (undated) DEVICE — SPECIMEN CONTAINER 100ML

## (undated) DEVICE — WARMING BLANKET LOWER ADULT

## (undated) DEVICE — SUT POLYSORB 4-0 P-12 UNDYED

## (undated) DEVICE — DRAPE C ARM MINI PACK FOR 6800

## (undated) DEVICE — FOLEY HOLDER STATLOCK 2 WAY ADULT

## (undated) DEVICE — PACK IV START WITH CHG

## (undated) DEVICE — CLAMP BX HOT RAD JAW 3

## (undated) DEVICE — PACK HAND

## (undated) DEVICE — TUBING FLUID ADMINISTRATION SET PRIM 70"

## (undated) DEVICE — CATH IV SAFE BC 20G X 1.16" (PINK)

## (undated) DEVICE — GLV 7 PROTEXIS (WHITE)

## (undated) DEVICE — SOL IRR POUR H2O 250ML

## (undated) DEVICE — DRSG CAST PLASTER XFAST 3"

## (undated) DEVICE — DRSG COBAN 4"

## (undated) DEVICE — TOURNIQUET ESMARK 4"

## (undated) DEVICE — TUBING IV SET GRAVITY 3Y 100" MACRO

## (undated) DEVICE — ELCTR GROUNDING PAD ADULT COVIDIEN

## (undated) DEVICE — TUBING TRUWAVE PRESSURE MALE/FEMALE 72"

## (undated) DEVICE — DRSG ACE BANDAGE 4" NS

## (undated) DEVICE — DRSG CAST PLASTER 3" (BLUE)

## (undated) DEVICE — TOURNIQUET CUFF 18" DUAL PORT DUAL BLADDER W PLC (BLACK)

## (undated) DEVICE — SYR LUER LOK 50CC

## (undated) DEVICE — POSITIONER FOAM EGG CRATE ULNAR 2PCS (PINK)

## (undated) DEVICE — BIOPSY FORCEP RADIAL JAW 4 STANDARD WITH NEEDLE

## (undated) DEVICE — SUCTION YANKAUER NO CONTROL VENT